# Patient Record
Sex: FEMALE | Race: WHITE | Employment: FULL TIME | URBAN - METROPOLITAN AREA
[De-identification: names, ages, dates, MRNs, and addresses within clinical notes are randomized per-mention and may not be internally consistent; named-entity substitution may affect disease eponyms.]

---

## 2018-04-20 RX ORDER — HEPARIN SODIUM (PORCINE) LOCK FLUSH IV SOLN 100 UNIT/ML 100 UNIT/ML
500 SOLUTION INTRAVENOUS PRN
Status: ACTIVE | OUTPATIENT
Start: 2018-04-20 | End: 2018-04-21

## 2018-04-20 RX ORDER — SODIUM CHLORIDE 0.9 % (FLUSH) 0.9 %
10 SYRINGE (ML) INJECTION PRN
Status: ACTIVE | OUTPATIENT
Start: 2018-04-20 | End: 2018-04-21

## 2018-04-27 ENCOUNTER — HOSPITAL ENCOUNTER (OUTPATIENT)
Dept: INFUSION THERAPY | Age: 48
Setting detail: INFUSION SERIES
Discharge: HOME | End: 2018-04-27
Payer: COMMERCIAL

## 2018-04-27 DIAGNOSIS — I87.8 POOR VENOUS ACCESS: ICD-10-CM

## 2018-04-27 RX ORDER — HEPARIN SODIUM (PORCINE) LOCK FLUSH IV SOLN 100 UNIT/ML 100 UNIT/ML
500 SOLUTION INTRAVENOUS PRN
Status: CANCELLED | OUTPATIENT
Start: 2018-04-27

## 2018-04-27 RX ORDER — HEPARIN SODIUM (PORCINE) LOCK FLUSH IV SOLN 100 UNIT/ML 100 UNIT/ML
500 SOLUTION INTRAVENOUS PRN
Status: ACTIVE | OUTPATIENT
Start: 2018-04-27 | End: 2018-04-28

## 2018-04-27 RX ORDER — SODIUM CHLORIDE 0.9 % (FLUSH) 0.9 %
10 SYRINGE (ML) INJECTION PRN
Status: CANCELLED | OUTPATIENT
Start: 2018-04-27

## 2018-04-27 RX ORDER — SODIUM CHLORIDE 0.9 % (FLUSH) 0.9 %
10 SYRINGE (ML) INJECTION PRN
Status: ACTIVE | OUTPATIENT
Start: 2018-04-27 | End: 2018-04-28

## 2018-05-02 ENCOUNTER — HOSPITAL ENCOUNTER (OUTPATIENT)
Dept: INFUSION THERAPY | Age: 48
Setting detail: INFUSION SERIES
Discharge: HOME | End: 2018-05-02
Payer: COMMERCIAL

## 2018-05-02 DIAGNOSIS — I87.8 POOR VENOUS ACCESS: ICD-10-CM

## 2018-05-02 PROCEDURE — 96523 IRRIG DRUG DELIVERY DEVICE: CPT

## 2018-05-02 RX ORDER — SODIUM CHLORIDE 0.9 % (FLUSH) 0.9 %
10 SYRINGE (ML) INJECTION PRN
Status: DISCONTINUED | OUTPATIENT
Start: 2018-05-02 | End: 2018-05-03 | Stop reason: HOSPADM

## 2018-05-02 RX ORDER — HEPARIN SODIUM (PORCINE) LOCK FLUSH IV SOLN 100 UNIT/ML 100 UNIT/ML
500 SOLUTION INTRAVENOUS PRN
Status: DISCONTINUED | OUTPATIENT
Start: 2018-05-02 | End: 2018-05-03 | Stop reason: HOSPADM

## 2018-05-02 RX ORDER — SODIUM CHLORIDE 0.9 % (FLUSH) 0.9 %
10 SYRINGE (ML) INJECTION PRN
Status: CANCELLED | OUTPATIENT
Start: 2018-05-02

## 2018-05-02 RX ORDER — HEPARIN SODIUM (PORCINE) LOCK FLUSH IV SOLN 100 UNIT/ML 100 UNIT/ML
500 SOLUTION INTRAVENOUS PRN
Status: CANCELLED | OUTPATIENT
Start: 2018-05-02

## 2018-05-25 ENCOUNTER — OFFICE VISIT (OUTPATIENT)
Dept: FAMILY MEDICINE CLINIC | Age: 48
End: 2018-05-25
Payer: COMMERCIAL

## 2018-05-25 VITALS
HEIGHT: 62 IN | RESPIRATION RATE: 14 BRPM | WEIGHT: 150 LBS | BODY MASS INDEX: 27.6 KG/M2 | SYSTOLIC BLOOD PRESSURE: 122 MMHG | TEMPERATURE: 98.5 F | OXYGEN SATURATION: 96 % | HEART RATE: 100 BPM | DIASTOLIC BLOOD PRESSURE: 68 MMHG

## 2018-05-25 DIAGNOSIS — F41.9 ANXIETY: ICD-10-CM

## 2018-05-25 DIAGNOSIS — M25.50 ARTHRALGIA, UNSPECIFIED JOINT: Primary | ICD-10-CM

## 2018-05-25 PROCEDURE — 99213 OFFICE O/P EST LOW 20 MIN: CPT | Performed by: NURSE PRACTITIONER

## 2018-05-25 RX ORDER — HYDROCODONE BITARTRATE AND ACETAMINOPHEN 5; 325 MG/1; MG/1
1 TABLET ORAL EVERY 8 HOURS PRN
Qty: 90 TABLET | Refills: 0 | Status: SHIPPED | OUTPATIENT
Start: 2018-05-25 | End: 2018-06-24

## 2018-05-25 RX ORDER — ALENDRONATE SODIUM 70 MG/1
70 TABLET ORAL WEEKLY
COMMUNITY
End: 2020-06-26

## 2018-05-25 RX ORDER — SULFASALAZINE 500 MG/1
500 TABLET ORAL 4 TIMES DAILY
COMMUNITY
End: 2018-09-07 | Stop reason: ALTCHOICE

## 2018-05-25 ASSESSMENT — ENCOUNTER SYMPTOMS
CONSTIPATION: 0
COUGH: 0
SHORTNESS OF BREATH: 1
DIARRHEA: 0
WHEEZING: 0
NAUSEA: 0
VOMITING: 0

## 2018-06-22 ENCOUNTER — HOSPITAL ENCOUNTER (OUTPATIENT)
Dept: INFUSION THERAPY | Age: 48
Setting detail: INFUSION SERIES
Discharge: HOME | End: 2018-06-22
Payer: COMMERCIAL

## 2018-06-22 PROCEDURE — 6360000002 HC RX W HCPCS: Performed by: INTERNAL MEDICINE

## 2018-06-22 PROCEDURE — 96523 IRRIG DRUG DELIVERY DEVICE: CPT

## 2018-06-22 PROCEDURE — 2580000003 HC RX 258: Performed by: INTERNAL MEDICINE

## 2018-06-22 RX ORDER — SODIUM CHLORIDE 0.9 % (FLUSH) 0.9 %
10 SYRINGE (ML) INJECTION PRN
Status: DISCONTINUED | OUTPATIENT
Start: 2018-06-22 | End: 2018-06-23 | Stop reason: HOSPADM

## 2018-06-22 RX ORDER — HEPARIN SODIUM (PORCINE) LOCK FLUSH IV SOLN 100 UNIT/ML 100 UNIT/ML
500 SOLUTION INTRAVENOUS PRN
Status: DISCONTINUED | OUTPATIENT
Start: 2018-06-22 | End: 2018-06-23 | Stop reason: HOSPADM

## 2018-06-22 RX ADMIN — Medication 10 ML: at 14:00

## 2018-06-22 RX ADMIN — Medication 10 ML: at 14:02

## 2018-06-22 RX ADMIN — Medication 10 ML: at 14:01

## 2018-06-22 RX ADMIN — Medication 500 UNITS: at 14:02

## 2018-07-06 ENCOUNTER — TELEPHONE (OUTPATIENT)
Dept: PHARMACY | Facility: CLINIC | Age: 48
End: 2018-07-06

## 2018-07-06 NOTE — TELEPHONE ENCOUNTER
CLINICAL PHARMACY CONSULT: MEDICATION CONVERSION INITIATIVE    Sabino Andujar is a 53 y.o. female referred to clinical pharmacy specialist by Roseline Colbert -  identified with current prescription for Proventil, which is non-preferred medication on insurance formulary. Choosing formulary options when clinically appropriate will help to decrease out of pocket cost for your patients, while maintaining the quality of care your patients receive    Indication: originally filled for acute URI, filled multiple times; Rx now   Current regimen: albuterol 2 puffs q6h prn wheezing; also on Symbicort 160-4.5 mcg 2 puff BID    Alternative(s):  - Per [facility], if appropriate ProAir (HFA or Respiclick) preferred albuterol inhaler so would be most cost effective option  - If patient is still using albuterol inhaler (appears was acute tx but appears she is also currently using Symbicort maintenance inhaler), pt needs new Rx sent to pharmacy, suggest change from Proventil HFA to ProAir HFA 2 puffs q6h prn    ======================================================  LM for patient to return call to review above.     Shruthi Alvarez, PharmD, [facility] Pharmacist  Direct: [phone]  Dept: [phone] (toll free [phone], option 7)

## 2018-07-06 NOTE — TELEPHONE ENCOUNTER
Pt returned requested call. States she has Proventil for prn use but does not wish to have new Rx sent at this time. Discussed previous information that ProAir should be less money for her. Pt appreciated outreach, will ask provider for new albuterol inhaler Rx if needed.     CLINICAL PHARMACY CONSULT: MED RECONCILIATION/REVIEW ADDENDUM    For Pharmacy Admin Tracking Only    PHSO: Yes  - Maintenance Safety Lab Monitoring #: 1  Time Spent (min): 1940 Medardo Coello, PharmD  [address]

## 2018-08-12 ENCOUNTER — HOSPITAL ENCOUNTER (EMERGENCY)
Age: 48
Discharge: HOME | End: 2018-08-12
Payer: COMMERCIAL

## 2018-08-12 VITALS
RESPIRATION RATE: 16 BRPM | OXYGEN SATURATION: 100 % | HEART RATE: 98 BPM | WEIGHT: 145 LBS | DIASTOLIC BLOOD PRESSURE: 87 MMHG | TEMPERATURE: 98.2 F | HEIGHT: 62 IN | SYSTOLIC BLOOD PRESSURE: 123 MMHG | BODY MASS INDEX: 26.68 KG/M2

## 2018-08-12 DIAGNOSIS — S80.11XA CONTUSION OF RIGHT LOWER LEG, INITIAL ENCOUNTER: Primary | ICD-10-CM

## 2018-08-12 DIAGNOSIS — S80.811A ABRASION OF RIGHT LOWER EXTREMITY, INITIAL ENCOUNTER: ICD-10-CM

## 2018-08-12 PROCEDURE — 99283 EMERGENCY DEPT VISIT LOW MDM: CPT

## 2018-08-12 PROCEDURE — 6360000002 HC RX W HCPCS: Performed by: PHYSICIAN ASSISTANT

## 2018-08-12 PROCEDURE — 90715 TDAP VACCINE 7 YRS/> IM: CPT | Performed by: PHYSICIAN ASSISTANT

## 2018-08-12 PROCEDURE — 90471 IMMUNIZATION ADMIN: CPT | Performed by: PHYSICIAN ASSISTANT

## 2018-08-12 RX ORDER — CEPHALEXIN 500 MG/1
500 CAPSULE ORAL 4 TIMES DAILY
Qty: 28 CAPSULE | Refills: 0 | Status: SHIPPED | OUTPATIENT
Start: 2018-08-12 | End: 2018-08-19

## 2018-08-12 RX ADMIN — TETANUS TOXOID, REDUCED DIPHTHERIA TOXOID AND ACELLULAR PERTUSSIS VACCINE, ADSORBED 0.5 ML: 5; 2.5; 8; 8; 2.5 SUSPENSION INTRAMUSCULAR at 12:54

## 2018-08-12 ASSESSMENT — PAIN DESCRIPTION - LOCATION: LOCATION: LEG

## 2018-08-12 ASSESSMENT — PAIN DESCRIPTION - PAIN TYPE: TYPE: ACUTE PAIN

## 2018-08-12 ASSESSMENT — PAIN SCALES - GENERAL: PAINLEVEL_OUTOF10: 7

## 2018-08-12 ASSESSMENT — PAIN DESCRIPTION - ORIENTATION: ORIENTATION: RIGHT

## 2018-08-15 ENCOUNTER — CARE COORDINATION (OUTPATIENT)
Dept: CARE COORDINATION | Age: 48
End: 2018-08-15

## 2018-08-17 ENCOUNTER — HOSPITAL ENCOUNTER (OUTPATIENT)
Dept: INFUSION THERAPY | Age: 48
Setting detail: INFUSION SERIES
Discharge: HOME | End: 2018-08-17
Payer: COMMERCIAL

## 2018-08-17 VITALS
DIASTOLIC BLOOD PRESSURE: 70 MMHG | HEART RATE: 90 BPM | OXYGEN SATURATION: 100 % | TEMPERATURE: 98 F | RESPIRATION RATE: 18 BRPM | SYSTOLIC BLOOD PRESSURE: 122 MMHG

## 2018-08-17 PROCEDURE — 2580000003 HC RX 258: Performed by: INTERNAL MEDICINE

## 2018-08-17 PROCEDURE — 96523 IRRIG DRUG DELIVERY DEVICE: CPT

## 2018-08-17 PROCEDURE — 6360000002 HC RX W HCPCS: Performed by: INTERNAL MEDICINE

## 2018-08-17 RX ORDER — SODIUM CHLORIDE 0.9 % (FLUSH) 0.9 %
10 SYRINGE (ML) INJECTION PRN
Status: DISCONTINUED | OUTPATIENT
Start: 2018-08-17 | End: 2018-08-18 | Stop reason: HOSPADM

## 2018-08-17 RX ORDER — HEPARIN SODIUM (PORCINE) LOCK FLUSH IV SOLN 100 UNIT/ML 100 UNIT/ML
500 SOLUTION INTRAVENOUS PRN
Status: DISCONTINUED | OUTPATIENT
Start: 2018-08-17 | End: 2018-08-18 | Stop reason: HOSPADM

## 2018-08-17 RX ADMIN — Medication 500 UNITS: at 14:42

## 2018-08-17 RX ADMIN — Medication 10 ML: at 14:41

## 2018-08-17 RX ADMIN — Medication 10 ML: at 14:40

## 2018-09-07 ENCOUNTER — OFFICE VISIT (OUTPATIENT)
Dept: FAMILY MEDICINE CLINIC | Age: 48
End: 2018-09-07
Payer: COMMERCIAL

## 2018-09-07 VITALS
DIASTOLIC BLOOD PRESSURE: 78 MMHG | SYSTOLIC BLOOD PRESSURE: 126 MMHG | WEIGHT: 152 LBS | RESPIRATION RATE: 16 BRPM | OXYGEN SATURATION: 97 % | HEART RATE: 99 BPM | HEIGHT: 62 IN | BODY MASS INDEX: 27.97 KG/M2 | TEMPERATURE: 97.9 F

## 2018-09-07 DIAGNOSIS — M05.79 RHEUMATOID ARTHRITIS INVOLVING MULTIPLE SITES WITH POSITIVE RHEUMATOID FACTOR (HCC): Primary | ICD-10-CM

## 2018-09-07 PROCEDURE — 99213 OFFICE O/P EST LOW 20 MIN: CPT | Performed by: NURSE PRACTITIONER

## 2018-09-07 RX ORDER — HYDROCODONE BITARTRATE AND ACETAMINOPHEN 5; 325 MG/1; MG/1
1 TABLET ORAL EVERY 8 HOURS PRN
Qty: 90 TABLET | Refills: 0 | Status: SHIPPED | OUTPATIENT
Start: 2018-09-07 | End: 2019-03-29 | Stop reason: SDUPTHER

## 2018-09-07 RX ORDER — HYDROCODONE BITARTRATE AND ACETAMINOPHEN 5; 325 MG/1; MG/1
1 TABLET ORAL EVERY 8 HOURS PRN
COMMUNITY
End: 2018-09-07 | Stop reason: SDUPTHER

## 2018-09-07 ASSESSMENT — ENCOUNTER SYMPTOMS
NAUSEA: 0
COUGH: 0
VOMITING: 0
DIARRHEA: 0
SHORTNESS OF BREATH: 1
CONSTIPATION: 0
WHEEZING: 0

## 2018-10-30 ENCOUNTER — HOSPITAL ENCOUNTER (OUTPATIENT)
Dept: INFUSION THERAPY | Age: 48
Setting detail: INFUSION SERIES
Discharge: HOME | End: 2018-10-30
Payer: COMMERCIAL

## 2018-10-30 VITALS
DIASTOLIC BLOOD PRESSURE: 89 MMHG | HEART RATE: 81 BPM | TEMPERATURE: 98.2 F | RESPIRATION RATE: 18 BRPM | SYSTOLIC BLOOD PRESSURE: 142 MMHG | OXYGEN SATURATION: 98 %

## 2018-10-30 DIAGNOSIS — I87.8 POOR VENOUS ACCESS: ICD-10-CM

## 2018-10-30 PROCEDURE — 2580000003 HC RX 258: Performed by: INTERNAL MEDICINE

## 2018-10-30 PROCEDURE — 96523 IRRIG DRUG DELIVERY DEVICE: CPT

## 2018-10-30 PROCEDURE — 6360000002 HC RX W HCPCS: Performed by: INTERNAL MEDICINE

## 2018-10-30 RX ORDER — HEPARIN SODIUM (PORCINE) LOCK FLUSH IV SOLN 100 UNIT/ML 100 UNIT/ML
500 SOLUTION INTRAVENOUS PRN
Status: CANCELLED | OUTPATIENT
Start: 2018-10-30

## 2018-10-30 RX ORDER — SODIUM CHLORIDE 0.9 % (FLUSH) 0.9 %
10 SYRINGE (ML) INJECTION PRN
Status: CANCELLED | OUTPATIENT
Start: 2018-10-30

## 2018-10-30 RX ORDER — SODIUM CHLORIDE 0.9 % (FLUSH) 0.9 %
10 SYRINGE (ML) INJECTION PRN
Status: DISCONTINUED | OUTPATIENT
Start: 2018-10-30 | End: 2018-10-31 | Stop reason: HOSPADM

## 2018-10-30 RX ORDER — HEPARIN SODIUM (PORCINE) LOCK FLUSH IV SOLN 100 UNIT/ML 100 UNIT/ML
500 SOLUTION INTRAVENOUS PRN
Status: DISCONTINUED | OUTPATIENT
Start: 2018-10-30 | End: 2018-10-31 | Stop reason: HOSPADM

## 2018-10-30 RX ADMIN — Medication 500 UNITS: at 13:51

## 2018-10-30 RX ADMIN — Medication 10 ML: at 13:51

## 2018-10-30 RX ADMIN — Medication 10 ML: at 13:50

## 2018-12-27 ENCOUNTER — OFFICE VISIT (OUTPATIENT)
Dept: FAMILY MEDICINE CLINIC | Age: 48
End: 2018-12-27
Payer: COMMERCIAL

## 2018-12-27 VITALS
BODY MASS INDEX: 28.16 KG/M2 | HEIGHT: 62 IN | RESPIRATION RATE: 16 BRPM | HEART RATE: 92 BPM | WEIGHT: 153 LBS | DIASTOLIC BLOOD PRESSURE: 68 MMHG | TEMPERATURE: 98.8 F | OXYGEN SATURATION: 98 % | SYSTOLIC BLOOD PRESSURE: 112 MMHG

## 2018-12-27 DIAGNOSIS — J32.9 SINOBRONCHITIS: ICD-10-CM

## 2018-12-27 DIAGNOSIS — R05.9 COUGH: ICD-10-CM

## 2018-12-27 DIAGNOSIS — R06.2 WHEEZING: ICD-10-CM

## 2018-12-27 DIAGNOSIS — F41.9 ANXIETY: ICD-10-CM

## 2018-12-27 DIAGNOSIS — J40 SINOBRONCHITIS: ICD-10-CM

## 2018-12-27 DIAGNOSIS — J06.9 VIRAL URI: Primary | ICD-10-CM

## 2018-12-27 PROCEDURE — 94640 AIRWAY INHALATION TREATMENT: CPT | Performed by: NURSE PRACTITIONER

## 2018-12-27 PROCEDURE — 99213 OFFICE O/P EST LOW 20 MIN: CPT | Performed by: NURSE PRACTITIONER

## 2018-12-27 RX ORDER — IPRATROPIUM BROMIDE AND ALBUTEROL SULFATE 2.5; .5 MG/3ML; MG/3ML
1 SOLUTION RESPIRATORY (INHALATION) ONCE
Status: COMPLETED | OUTPATIENT
Start: 2018-12-27 | End: 2018-12-27

## 2018-12-27 RX ORDER — DEXTROMETHORPHAN HYDROBROMIDE AND PROMETHAZINE HYDROCHLORIDE 15; 6.25 MG/5ML; MG/5ML
5 SYRUP ORAL 4 TIMES DAILY PRN
Qty: 120 ML | Refills: 0 | Status: SHIPPED | OUTPATIENT
Start: 2018-12-27 | End: 2019-01-06

## 2018-12-27 RX ORDER — GUAIFENESIN 600 MG/1
600 TABLET, EXTENDED RELEASE ORAL 2 TIMES DAILY
Qty: 30 TABLET | Refills: 0 | Status: SHIPPED | OUTPATIENT
Start: 2018-12-27 | End: 2019-03-29

## 2018-12-27 RX ORDER — BUDESONIDE AND FORMOTEROL FUMARATE DIHYDRATE 160; 4.5 UG/1; UG/1
2 AEROSOL RESPIRATORY (INHALATION) 2 TIMES DAILY
Qty: 10.2 G | Refills: 2 | Status: SHIPPED | OUTPATIENT
Start: 2018-12-27 | End: 2019-06-21 | Stop reason: SDUPTHER

## 2018-12-27 RX ORDER — ALBUTEROL SULFATE 90 UG/1
2 AEROSOL, METERED RESPIRATORY (INHALATION) EVERY 6 HOURS PRN
Qty: 1 INHALER | Refills: 0 | Status: SHIPPED
Start: 2018-12-27 | End: 2020-06-26

## 2018-12-27 RX ORDER — AZITHROMYCIN 250 MG/1
TABLET, FILM COATED ORAL
Qty: 1 PACKET | Refills: 0 | Status: SHIPPED | OUTPATIENT
Start: 2018-12-27 | End: 2019-03-29

## 2018-12-27 RX ADMIN — IPRATROPIUM BROMIDE AND ALBUTEROL SULFATE 1 AMPULE: 2.5; .5 SOLUTION RESPIRATORY (INHALATION) at 11:47

## 2018-12-27 ASSESSMENT — ENCOUNTER SYMPTOMS
WHEEZING: 1
NAUSEA: 0
SHORTNESS OF BREATH: 0
SORE THROAT: 1
COUGH: 1
SINUS PRESSURE: 1
DIARRHEA: 0
VOMITING: 0

## 2018-12-27 ASSESSMENT — PATIENT HEALTH QUESTIONNAIRE - PHQ9
SUM OF ALL RESPONSES TO PHQ QUESTIONS 1-9: 0
SUM OF ALL RESPONSES TO PHQ9 QUESTIONS 1 & 2: 0
1. LITTLE INTEREST OR PLEASURE IN DOING THINGS: 0
SUM OF ALL RESPONSES TO PHQ QUESTIONS 1-9: 0
2. FEELING DOWN, DEPRESSED OR HOPELESS: 0

## 2019-01-08 DIAGNOSIS — J32.9 SINOBRONCHITIS: Primary | ICD-10-CM

## 2019-01-08 DIAGNOSIS — J40 SINOBRONCHITIS: Primary | ICD-10-CM

## 2019-01-08 RX ORDER — AMOXICILLIN 875 MG/1
875 TABLET, COATED ORAL 2 TIMES DAILY
Qty: 20 TABLET | Refills: 0 | Status: SHIPPED | OUTPATIENT
Start: 2019-01-08 | End: 2019-01-18

## 2019-01-08 RX ORDER — FLUCONAZOLE 150 MG/1
150 TABLET ORAL ONCE
Qty: 1 TABLET | Refills: 1 | Status: SHIPPED | OUTPATIENT
Start: 2019-01-08 | End: 2019-01-08

## 2019-03-29 ENCOUNTER — OFFICE VISIT (OUTPATIENT)
Dept: FAMILY MEDICINE CLINIC | Age: 49
End: 2019-03-29
Payer: COMMERCIAL

## 2019-03-29 VITALS
TEMPERATURE: 98.1 F | HEART RATE: 72 BPM | BODY MASS INDEX: 25.76 KG/M2 | OXYGEN SATURATION: 97 % | SYSTOLIC BLOOD PRESSURE: 116 MMHG | HEIGHT: 62 IN | RESPIRATION RATE: 16 BRPM | WEIGHT: 140 LBS | DIASTOLIC BLOOD PRESSURE: 60 MMHG

## 2019-03-29 DIAGNOSIS — M05.79 RHEUMATOID ARTHRITIS INVOLVING MULTIPLE SITES WITH POSITIVE RHEUMATOID FACTOR (HCC): ICD-10-CM

## 2019-03-29 PROCEDURE — 99213 OFFICE O/P EST LOW 20 MIN: CPT | Performed by: NURSE PRACTITIONER

## 2019-03-29 RX ORDER — HYDROCODONE BITARTRATE AND ACETAMINOPHEN 5; 325 MG/1; MG/1
1 TABLET ORAL EVERY 8 HOURS PRN
Qty: 90 TABLET | Refills: 0 | Status: ON HOLD | OUTPATIENT
Start: 2019-03-29 | End: 2019-04-13 | Stop reason: HOSPADM

## 2019-03-29 RX ORDER — HYDROCODONE BITARTRATE AND ACETAMINOPHEN 5; 325 MG/1; MG/1
1 TABLET ORAL EVERY 8 HOURS PRN
COMMUNITY
End: 2019-03-29

## 2019-03-29 ASSESSMENT — ENCOUNTER SYMPTOMS
VOMITING: 0
WHEEZING: 0
NAUSEA: 0
CONSTIPATION: 0
SHORTNESS OF BREATH: 0
DIARRHEA: 0
COUGH: 0

## 2019-04-10 ENCOUNTER — HOSPITAL ENCOUNTER (INPATIENT)
Age: 49
LOS: 3 days | Discharge: HOME | DRG: 194 | End: 2019-04-13
Attending: EMERGENCY MEDICINE | Admitting: INTERNAL MEDICINE
Payer: COMMERCIAL

## 2019-04-10 ENCOUNTER — APPOINTMENT (OUTPATIENT)
Dept: CT IMAGING | Age: 49
DRG: 194 | End: 2019-04-10
Payer: COMMERCIAL

## 2019-04-10 ENCOUNTER — APPOINTMENT (OUTPATIENT)
Dept: GENERAL RADIOLOGY | Age: 49
DRG: 194 | End: 2019-04-10
Payer: COMMERCIAL

## 2019-04-10 DIAGNOSIS — R91.8 MASS OF UPPER LOBE OF RIGHT LUNG: ICD-10-CM

## 2019-04-10 DIAGNOSIS — J18.9 COMMUNITY ACQUIRED PNEUMONIA OF RIGHT LUNG, UNSPECIFIED PART OF LUNG: Primary | ICD-10-CM

## 2019-04-10 PROBLEM — J15.9 COMMUNITY ACQUIRED BACTERIAL PNEUMONIA: Status: ACTIVE | Noted: 2019-04-10

## 2019-04-10 LAB
ANION GAP SERPL CALCULATED.3IONS-SCNC: 12 MMOL/L (ref 7–16)
BACTERIA: ABNORMAL /HPF
BASOPHILS ABSOLUTE: 0.04 E9/L (ref 0–0.2)
BASOPHILS RELATIVE PERCENT: 0.4 % (ref 0–2)
BILIRUBIN URINE: NEGATIVE
BLOOD, URINE: NEGATIVE
BUN BLDV-MCNC: 8 MG/DL (ref 6–20)
CALCIUM SERPL-MCNC: 9.9 MG/DL (ref 8.6–10.2)
CHLORIDE BLD-SCNC: 98 MMOL/L (ref 98–107)
CLARITY: CLEAR
CO2: 26 MMOL/L (ref 22–29)
COLOR: ABNORMAL
CREAT SERPL-MCNC: 0.7 MG/DL (ref 0.5–1)
EOSINOPHILS ABSOLUTE: 0.01 E9/L (ref 0.05–0.5)
EOSINOPHILS RELATIVE PERCENT: 0.1 % (ref 0–6)
GFR AFRICAN AMERICAN: >60
GFR NON-AFRICAN AMERICAN: >60 ML/MIN/1.73
GLUCOSE BLD-MCNC: 95 MG/DL (ref 74–99)
GLUCOSE URINE: NEGATIVE MG/DL
HCT VFR BLD CALC: 40.5 % (ref 34–48)
HEMOGLOBIN: 13.2 G/DL (ref 11.5–15.5)
IMMATURE GRANULOCYTES #: 0.04 E9/L
IMMATURE GRANULOCYTES %: 0.4 % (ref 0–5)
INFLUENZA A BY PCR: NOT DETECTED
INFLUENZA B BY PCR: NOT DETECTED
KETONES, URINE: NEGATIVE MG/DL
LEUKOCYTE ESTERASE, URINE: NEGATIVE
LYMPHOCYTES ABSOLUTE: 0.83 E9/L (ref 1.5–4)
LYMPHOCYTES RELATIVE PERCENT: 7.8 % (ref 20–42)
MCH RBC QN AUTO: 27.9 PG (ref 26–35)
MCHC RBC AUTO-ENTMCNC: 32.6 % (ref 32–34.5)
MCV RBC AUTO: 85.6 FL (ref 80–99.9)
MONOCYTES ABSOLUTE: 0.59 E9/L (ref 0.1–0.95)
MONOCYTES RELATIVE PERCENT: 5.6 % (ref 2–12)
NEUTROPHILS ABSOLUTE: 9.09 E9/L (ref 1.8–7.3)
NEUTROPHILS RELATIVE PERCENT: 85.7 % (ref 43–80)
NITRITE, URINE: NEGATIVE
PDW BLD-RTO: 14.1 FL (ref 11.5–15)
PH UA: 7 (ref 5–9)
PLATELET # BLD: 224 E9/L (ref 130–450)
PMV BLD AUTO: 10.4 FL (ref 7–12)
POTASSIUM SERPL-SCNC: 3.6 MMOL/L (ref 3.5–5)
PROTEIN UA: NEGATIVE MG/DL
RBC # BLD: 4.73 E12/L (ref 3.5–5.5)
RBC UA: ABNORMAL /HPF (ref 0–2)
SODIUM BLD-SCNC: 136 MMOL/L (ref 132–146)
SPECIFIC GRAVITY UA: <=1.005 (ref 1–1.03)
TROPONIN: <0.01 NG/ML (ref 0–0.03)
UROBILINOGEN, URINE: 0.2 E.U./DL
WBC # BLD: 10.6 E9/L (ref 4.5–11.5)
WBC UA: ABNORMAL /HPF (ref 0–5)

## 2019-04-10 PROCEDURE — 71275 CT ANGIOGRAPHY CHEST: CPT

## 2019-04-10 PROCEDURE — 2580000003 HC RX 258: Performed by: INTERNAL MEDICINE

## 2019-04-10 PROCEDURE — 6360000002 HC RX W HCPCS: Performed by: EMERGENCY MEDICINE

## 2019-04-10 PROCEDURE — 87081 CULTURE SCREEN ONLY: CPT

## 2019-04-10 PROCEDURE — 87633 RESP VIRUS 12-25 TARGETS: CPT

## 2019-04-10 PROCEDURE — 87486 CHLMYD PNEUM DNA AMP PROBE: CPT

## 2019-04-10 PROCEDURE — 6370000000 HC RX 637 (ALT 250 FOR IP): Performed by: INTERNAL MEDICINE

## 2019-04-10 PROCEDURE — 87040 BLOOD CULTURE FOR BACTERIA: CPT

## 2019-04-10 PROCEDURE — 99285 EMERGENCY DEPT VISIT HI MDM: CPT

## 2019-04-10 PROCEDURE — 85025 COMPLETE CBC W/AUTO DIFF WBC: CPT

## 2019-04-10 PROCEDURE — 96375 TX/PRO/DX INJ NEW DRUG ADDON: CPT

## 2019-04-10 PROCEDURE — 6360000004 HC RX CONTRAST MEDICATION: Performed by: RADIOLOGY

## 2019-04-10 PROCEDURE — 87450 HC DIRECT STREP B ANTIGEN: CPT

## 2019-04-10 PROCEDURE — 80048 BASIC METABOLIC PNL TOTAL CA: CPT

## 2019-04-10 PROCEDURE — 81001 URINALYSIS AUTO W/SCOPE: CPT

## 2019-04-10 PROCEDURE — 87798 DETECT AGENT NOS DNA AMP: CPT

## 2019-04-10 PROCEDURE — 87502 INFLUENZA DNA AMP PROBE: CPT

## 2019-04-10 PROCEDURE — 2060000000 HC ICU INTERMEDIATE R&B

## 2019-04-10 PROCEDURE — 36415 COLL VENOUS BLD VENIPUNCTURE: CPT

## 2019-04-10 PROCEDURE — 6360000002 HC RX W HCPCS: Performed by: INTERNAL MEDICINE

## 2019-04-10 PROCEDURE — 87581 M.PNEUMON DNA AMP PROBE: CPT

## 2019-04-10 PROCEDURE — 94640 AIRWAY INHALATION TREATMENT: CPT

## 2019-04-10 PROCEDURE — 71045 X-RAY EXAM CHEST 1 VIEW: CPT

## 2019-04-10 PROCEDURE — 93005 ELECTROCARDIOGRAM TRACING: CPT | Performed by: EMERGENCY MEDICINE

## 2019-04-10 PROCEDURE — 2580000003 HC RX 258: Performed by: EMERGENCY MEDICINE

## 2019-04-10 PROCEDURE — 84484 ASSAY OF TROPONIN QUANT: CPT

## 2019-04-10 PROCEDURE — 96365 THER/PROPH/DIAG IV INF INIT: CPT

## 2019-04-10 PROCEDURE — 6370000000 HC RX 637 (ALT 250 FOR IP): Performed by: EMERGENCY MEDICINE

## 2019-04-10 RX ORDER — SODIUM CHLORIDE 9 MG/ML
INJECTION, SOLUTION INTRAVENOUS CONTINUOUS
Status: DISCONTINUED | OUTPATIENT
Start: 2019-04-10 | End: 2019-04-13 | Stop reason: HOSPADM

## 2019-04-10 RX ORDER — SODIUM CHLORIDE 0.9 % (FLUSH) 0.9 %
10 SYRINGE (ML) INJECTION EVERY 12 HOURS SCHEDULED
Status: DISCONTINUED | OUTPATIENT
Start: 2019-04-10 | End: 2019-04-13 | Stop reason: HOSPADM

## 2019-04-10 RX ORDER — IPRATROPIUM BROMIDE AND ALBUTEROL SULFATE 2.5; .5 MG/3ML; MG/3ML
1 SOLUTION RESPIRATORY (INHALATION) ONCE
Status: COMPLETED | OUTPATIENT
Start: 2019-04-10 | End: 2019-04-10

## 2019-04-10 RX ORDER — LORAZEPAM 2 MG/ML
1 INJECTION INTRAMUSCULAR EVERY 6 HOURS PRN
Status: DISCONTINUED | OUTPATIENT
Start: 2019-04-10 | End: 2019-04-13 | Stop reason: HOSPADM

## 2019-04-10 RX ORDER — METHYLPREDNISOLONE SODIUM SUCCINATE 125 MG/2ML
80 INJECTION, POWDER, LYOPHILIZED, FOR SOLUTION INTRAMUSCULAR; INTRAVENOUS EVERY 8 HOURS
Status: DISCONTINUED | OUTPATIENT
Start: 2019-04-10 | End: 2019-04-11

## 2019-04-10 RX ORDER — SODIUM CHLORIDE 0.9 % (FLUSH) 0.9 %
10 SYRINGE (ML) INJECTION PRN
Status: DISCONTINUED | OUTPATIENT
Start: 2019-04-10 | End: 2019-04-13 | Stop reason: HOSPADM

## 2019-04-10 RX ORDER — ACETAMINOPHEN 325 MG/1
650 TABLET ORAL EVERY 4 HOURS PRN
Status: DISCONTINUED | OUTPATIENT
Start: 2019-04-10 | End: 2019-04-13 | Stop reason: HOSPADM

## 2019-04-10 RX ORDER — IPRATROPIUM BROMIDE AND ALBUTEROL SULFATE 2.5; .5 MG/3ML; MG/3ML
1 SOLUTION RESPIRATORY (INHALATION)
Status: DISCONTINUED | OUTPATIENT
Start: 2019-04-10 | End: 2019-04-13 | Stop reason: HOSPADM

## 2019-04-10 RX ORDER — BENZONATATE 100 MG/1
100 CAPSULE ORAL 3 TIMES DAILY PRN
Status: DISCONTINUED | OUTPATIENT
Start: 2019-04-10 | End: 2019-04-13 | Stop reason: HOSPADM

## 2019-04-10 RX ORDER — SULFASALAZINE 500 MG/1
1000 TABLET ORAL 2 TIMES DAILY
COMMUNITY
End: 2020-05-22 | Stop reason: SDUPTHER

## 2019-04-10 RX ORDER — AZITHROMYCIN 250 MG/1
500 TABLET, FILM COATED ORAL DAILY
Status: DISCONTINUED | OUTPATIENT
Start: 2019-04-11 | End: 2019-04-13 | Stop reason: HOSPADM

## 2019-04-10 RX ORDER — CLINDAMYCIN PHOSPHATE 600 MG/50ML
600 INJECTION INTRAVENOUS EVERY 8 HOURS
Status: CANCELLED | OUTPATIENT
Start: 2019-04-10

## 2019-04-10 RX ORDER — 0.9 % SODIUM CHLORIDE 0.9 %
1000 INTRAVENOUS SOLUTION INTRAVENOUS ONCE
Status: COMPLETED | OUTPATIENT
Start: 2019-04-10 | End: 2019-04-10

## 2019-04-10 RX ADMIN — WATER 1 G: 1 INJECTION INTRAMUSCULAR; INTRAVENOUS; SUBCUTANEOUS at 11:10

## 2019-04-10 RX ADMIN — AZITHROMYCIN MONOHYDRATE 500 MG: 500 INJECTION, POWDER, LYOPHILIZED, FOR SOLUTION INTRAVENOUS at 11:11

## 2019-04-10 RX ADMIN — IPRATROPIUM BROMIDE AND ALBUTEROL SULFATE 1 AMPULE: 2.5; .5 SOLUTION RESPIRATORY (INHALATION) at 08:09

## 2019-04-10 RX ADMIN — METHYLPREDNISOLONE SODIUM SUCCINATE 80 MG: 125 INJECTION, POWDER, FOR SOLUTION INTRAMUSCULAR; INTRAVENOUS at 13:55

## 2019-04-10 RX ADMIN — BENZONATATE 100 MG: 100 CAPSULE ORAL at 13:56

## 2019-04-10 RX ADMIN — ENOXAPARIN SODIUM 40 MG: 100 INJECTION SUBCUTANEOUS at 13:56

## 2019-04-10 RX ADMIN — IPRATROPIUM BROMIDE AND ALBUTEROL SULFATE 1 AMPULE: .5; 3 SOLUTION RESPIRATORY (INHALATION) at 18:57

## 2019-04-10 RX ADMIN — IPRATROPIUM BROMIDE AND ALBUTEROL SULFATE 1 AMPULE: .5; 3 SOLUTION RESPIRATORY (INHALATION) at 13:58

## 2019-04-10 RX ADMIN — SODIUM CHLORIDE 1000 ML: 9 INJECTION, SOLUTION INTRAVENOUS at 08:34

## 2019-04-10 RX ADMIN — IOPAMIDOL 80 ML: 755 INJECTION, SOLUTION INTRAVENOUS at 09:26

## 2019-04-10 RX ADMIN — SODIUM CHLORIDE: 9 INJECTION, SOLUTION INTRAVENOUS at 14:59

## 2019-04-10 RX ADMIN — LORAZEPAM 1 MG: 2 INJECTION INTRAMUSCULAR; INTRAVENOUS at 22:22

## 2019-04-10 RX ADMIN — Medication 10 ML: at 22:06

## 2019-04-10 RX ADMIN — METHYLPREDNISOLONE SODIUM SUCCINATE 80 MG: 125 INJECTION, POWDER, FOR SOLUTION INTRAMUSCULAR; INTRAVENOUS at 22:06

## 2019-04-10 RX ADMIN — ACETAMINOPHEN 650 MG: 325 TABLET ORAL at 13:55

## 2019-04-10 ASSESSMENT — ENCOUNTER SYMPTOMS
WHEEZING: 0
BACK PAIN: 0
SHORTNESS OF BREATH: 1
PHOTOPHOBIA: 0
CONSTIPATION: 0
NAUSEA: 0
SINUS PAIN: 0
RHINORRHEA: 0
SINUS PRESSURE: 0
VOMITING: 0
SORE THROAT: 0
CHEST TIGHTNESS: 1
DIARRHEA: 0
ABDOMINAL PAIN: 0
COUGH: 1

## 2019-04-10 ASSESSMENT — PAIN SCALES - GENERAL
PAINLEVEL_OUTOF10: 0
PAINLEVEL_OUTOF10: 7
PAINLEVEL_OUTOF10: 0
PAINLEVEL_OUTOF10: 0

## 2019-04-10 ASSESSMENT — PAIN DESCRIPTION - LOCATION: LOCATION: CHEST

## 2019-04-10 ASSESSMENT — PAIN DESCRIPTION - ORIENTATION: ORIENTATION: RIGHT;LEFT

## 2019-04-10 NOTE — ED NOTES
Report called to floor RN. All questions and concerns answered.   To floor via bed     Sonia Tang RN  04/10/19 [id number]

## 2019-04-10 NOTE — CONSULTS
Consults   Hematology/Oncology  Consult      Patient Name: Sarah Pan  YOB: 1970  PCP: Мария Chan DO   Referring Provider:      Reason for Consultation:   Chief Complaint   Patient presents with    Shortness of Breath     pt to er via ambulatory was at work c/o of sob jasiel 2 days states hx of lung ca 2015         History of Present Illness:  50-year-old woman with history of lung cancer. 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 She was treated at [facility] by Dr. Ava Ward . She received 2 concurrent cycles of cisplatin and etoposide with chest radiation followed by consolidation with 2 additional cycles of chemotherapy. She did well for 3 years and was followed with surveillance and apparently her CT had shown a nonspecific sclerotic lesion at the level of the 3rd rib. She had developed an episode of herpes zoster last year  She has history of rheumatoid arthritis and was treated with Plaquenil and prednisone      She is now hospitalized through the emergency room with chest wall pain along with shortness of breath. Follow-up CT chest shows new area of obstruction and volume loss in the middle lobe highly suspicious for disease recurrence with airspace disease in the right lower lobe likely pneumonia.       Diagnostic Data:     Past Medical History:   Diagnosis Date    Cancer [facility]) lung    Depression 6/22/2016    Pneumonia     Rheumatoid arthritis ([facility]     Rheumatoid arthritis involving multiple sites with positive rheumatoid factor ([facility] 9/7/2018       Patient Active Problem List    Diagnosis Date Noted    Neutropenic fever ([facility] 01/04/2016     Priority: High    Heel spur 04/03/2015     Priority: Low     Class: Present on Admission    Achilles tendinitis of left lower extremity 04/03/2015     Priority: Low     Class: Present on Admission    Community acquired bacterial pneumonia 04/10/2019    Lung mass 04/10/2019    Rheumatoid arthritis involving multiple sites with positive rheumatoid factor ([facility] 09/07/2018    Depression 06/22/2016    Arthralgia 05/31/2016    Arthralgia 05/03/2016    Localized edema 04/25/2016    Tenosynovitis, de Quervain 04/01/2016    Acute nasopharyngitis 03/14/2016    Poor venous access 02/12/2016    Bronchogenic carcinoma of right lung ([facility] 01/14/2016    Abscess of left upper extremity 01/08/2016        Past Surgical History:   Procedure Laterality Date    BLADDER SURGERY      HYSTERECTOMY      LUNG CANCER SURGERY Right 2015    OTHER SURGICAL HISTORY Right 04/03/15    retro calconeal heel spur excision with speed bridge right foot    TUBAL LIGATION         Family History  History reviewed. No pertinent family history. Social History    TOBACCO:   reports that she has quit smoking. She smoked 1.00 pack per day. She has never used smokeless tobacco.  ETOH:   reports that she drinks alcohol. Home Medications  Prior to Admission medications    Medication Sig Start Date End Date Taking? Authorizing Provider   influenza virus trivalent vaccine (FLUZONE) injection Inject 0.5 mLs into the muscle once Given 9/7/2018   Yes Historical Provider, MD   sulfaSALAzine (AZULFIDINE) 500 MG tablet Take 1,000 mg by mouth 2 times daily   Yes Historical Provider, MD   Ascorbic Acid (VITAMIN C PO) Take 1 tablet by mouth daily   Yes Historical Provider, MD   HYDROcodone-acetaminophen (NORCO) 5-325 MG per tablet Take 1 tablet by mouth every 8 hours as needed for Pain for up to 30 days.  3/29/19 4/28/19 Yes Kristyn Owen, APRN - CNP   sertraline (ZOLOFT) 50 MG tablet Take 1 tablet by mouth daily 12/27/18 Yes Kristyn Flores APRN - CNP   albuterol sulfate  (90 Base) MCG/ACT inhaler Inhale 2 puffs into the lungs every 6 hours as needed for Wheezing 12/27/18  Yes ALLEN Whiteside - CORI   budesonide-formoterol (SYMBICORT) 160-4.5 MCG/ACT AERO Inhale 2 puffs into the lungs 2 times daily  Patient taking differently: Inhale 2 puffs into the lungs 2 times daily as needed (Shortness of Breath/Wheezing)  12/27/18  Yes Kev Flores APRN - CNP   alendronate (FOSAMAX) 70 MG tablet Take 70 mg by mouth once a week Monday   Yes Historical Provider, MD   hydroxychloroquine (PLAQUENIL) 200 MG tablet Take 200 mg by mouth daily   Yes Historical Provider, MD       Allergies  No Known Allergies    Review of Systems:  Relevant for progressively worsening shortness of breath with occasional cough atypical chest pain and discomfort as well as occasional joint aches  No weight loss    Objective  /69   Pulse 111   Temp 100.5 °F (38.1 °C) (Oral)   Resp 24   Ht 5' 2\" (1.575 m)   Wt 145 lb (65.8 kg)   LMP  (LMP Unknown)   SpO2 97%   BMI 26.52 kg/m²     Physical Performance Status : 0    Physical Exam:  General: AAO to person, place, time,no acute distress, pleasant   Head and neck : PERRLA, EOMI . Sclera non icteric. Oropharynx : Clear  Neck: no JVD,  no adenopathy  Lungs: Decreased breath sounds in right lung fields with scattered rhonchi  Heart: Regular rate and regular rhythm,  Abdomen: Soft, non-tender;no masses, no organomegaly  Extremities: No edema,no cyanosis, no clubbing. Skin:  No rash. Neurologic:Cranial nerves grossly intact. No focal motor or sensory deficits .     Recent Laboratory Data-   Lab Results   Component Value Date    WBC 10.6 04/10/2019    HGB 13.2 04/10/2019    HCT 40.5 04/10/2019    MCV 85.6 04/10/2019     04/10/2019    LYMPHOPCT 7.8 (L) 04/10/2019    RBC 4.73 04/10/2019    MCH 27.9 04/10/2019    MCHC 32.6 04/10/2019    RDW 14.1 04/10/2019    NEUTOPHILPCT 85.7 (H) 04/10/2019 MONOPCT 5.6 04/10/2019    BASOPCT 0.4 04/10/2019    NEUTROABS 9.09 (H) 04/10/2019    LYMPHSABS 0.83 (L) 04/10/2019    MONOSABS 0.59 04/10/2019    EOSABS 0.01 (L) 04/10/2019    BASOSABS 0.04 04/10/2019       Lab Results   Component Value Date     04/10/2019    K 3.6 04/10/2019    CL 98 04/10/2019    CO2 26 04/10/2019    BUN 8 04/10/2019    CREATININE 0.7 04/10/2019    GLUCOSE 95 04/10/2019    CALCIUM 9.9 04/10/2019    PROT 7.9 06/01/2016    LABALBU 3.6 06/01/2016    BILITOT 0.3 06/01/2016    ALKPHOS 84 06/01/2016    AST 11 06/01/2016    ALT 8 06/01/2016    LABGLOM >60 04/10/2019    GFRAA >60 04/10/2019       No results found for: IRON, TIBC, FERRITIN        Radiology-    Xr Chest Portable    Result Date: 4/10/2019  LOCATION: 200 EXAM: XR CHEST PORTABLE COMPARISON: Prior stating back to January 2016 HISTORY: Shortness of breath TECHNIQUE: Single frontal view of the chest was obtained. FINDINGS:  SUPPORT DEVICES: None. LUNGS: There is increase in a paramediastinal opacity in the right upper lung zone measuring 4 cm in maximal dimension. This is concerning for recurrent malignancy. Left lung is clear. PLEURA: No effusions or pneumothorax. LUNG VOLUMES: Satisfactory inspirator effort. MEDIASTINAL STRUCTURES: No lymphadenopathy. Normal aortic contour. HEART SIZE: Normal. BONES AND SOFT TISSUES: No fracture or soft tissue abnormality. Increasing right paramediastinal opacity is concerning for recurrent malignancy. Chest CT should be obtained for further assessment. Cta Chest W Contrast    Result Date: 4/10/2019  LOCATION:200 EXAM: CTA CHEST W CONTRAST COMPARISON: December 29, 2017 chest CT HISTORY:  History of lung cancer shortness of breath, new lung lesion TECHNIQUE: Axial CT images are obtained of the chest.  Coronal and sagittal reconstructions were obtained with 3-D maximum intensity projection (MIP) reconstructed images.   These were performed on a separate workstation with concurrent supervision for detailed evaluation of the pulmonary arteries. CONTRAST: 80 mL Isovue-370 intravenous contrast. FINDINGS: SUPPORT DEVICES: None LUNGS/CENTRAL AIRWAYS/PLEURA: Postsurgical changes from right upper lobe lobectomy. There is increasing confluent airspace disease and soft tissue in the right lung apex at the level of the surgical staple line with adjacent surgical staples. The consolidation extends into the anterior aspect of the right upper lobe with volume loss of the middle lobe. The middle lobe bronchus is no longer visualized. The amount of confluent airspace disease in the area of the radiation field appears slightly increased from the previous study. There is moderate to severe emphysema throughout the left lung. No lung nodules are identified. PULMONARY ARTERIES: Evaluation is adequate for pulmonary embolus. No filling defects identified to the subsegmental level. HEART/PERICARDIUM/GREAT VESSELS: Cardiac size is normal.  There is no pericardial effusion. The great vessels of the chest are normal in caliber. LYMPH NODES: No thoracic adenopathy by size criteria. NECK BASE/CHEST WALL/DIAPHRAGM: No soft tissue lesions or diaphragmatic abnormality. UPPER ABDOMEN: Limited images through the upper abdomen are unremarkable. OSSEOUS STRUCTURES: No suspicious lytic or blastic lesions. No fractures. 1. There is new obstruction and volume loss of the middle lobe bronchus and middle lobe. This is concerning for localized recurrence at the level of the lobectomy site. Consider follow-up with direct visualization. 2. Slight increase in airspace disease involving the posterior aspect of the right lower lobe, likely infectious. 3. No new lung nodules or lymphadenopathy. ASSESSMENT/PLAN :    41-year-old woman with a history of squamous cell carcinoma of the right lung diagnosed in September 2015 status post right upper lobe lobectomy at [address] [place] [address].  She had positive surgical margins and N1 disease and went on to receive concurrent cis-platinum and etoposide with chest radiation followed by 2 cycles of consolidation with cis-platinum and etoposide  Her treatments back then with complicated by neutropenic fevers. She was treated and followed by Dr. Goldy Carey at [facility]    At this time there is strong suspicion of disease recurrence in the right lung    Pulmonary will be consulted for bronchoscopy and tissue sampling to establish recurrence  Her specimen to be sent for PD L1 expression    Her staging workup will be completed to include a CT scan of abdomen and pelvis as well as a bone scan    Once workup completed therapeutic options will be discussed. Will ask radiation Oncology to see ,to determine if any additional form of radiation can be done if local recurrence established        Isabella Truong M.D., F.A.C.P.   Electronically signed 4/10/2019 at 2:10 PM

## 2019-04-10 NOTE — ED PROVIDER NOTES
Patient is a 59-year-old female who presents with chief complaint of chest tightness and shortness of breath. Patient states that the past 3 days she is inexpensive and worsening shortness of breath, especially with exertion. She also admits to chest tightness. States that this happened suddenly 3 days ago. The patient initially had a dry cough and states that she just thought she was getting sick, but has now progressed to worsening shortness of breath. The patient was to mild lightheadedness when bending over. Denies any dizziness, diaphoresis, abdominal pain, nausea, vomiting, leg swelling. The patient admits to history of lung cancer and was treated with chemotherapy and radiation. The patient also had a partial lobectomy on the right. Patient denies any history of COPD or emphysema. She does smoke intermittently. No other recent illnesses. The history is provided by the patient. No  was used. Review of Systems   Constitutional: Negative for chills, fatigue and fever. HENT: Negative for congestion, rhinorrhea, sinus pressure, sinus pain, sneezing and sore throat. Eyes: Negative for photophobia and visual disturbance. Respiratory: Positive for cough, chest tightness and shortness of breath. Negative for wheezing. Cardiovascular: Negative for chest pain and palpitations. Gastrointestinal: Negative for abdominal pain, constipation, diarrhea, nausea and vomiting. Genitourinary: Negative for dysuria, frequency and hematuria. Musculoskeletal: Negative for back pain, neck pain and neck stiffness. Skin: Negative for rash and wound. Neurological: Positive for light-headedness. Negative for dizziness and headaches. Psychiatric/Behavioral: Negative for agitation, behavioral problems and confusion. Physical Exam   Constitutional: She is oriented to person, place, and time. She appears well-developed and well-nourished. No distress.    HENT:   Head: Normocephalic and atraumatic. Eyes: Conjunctivae are normal. Right eye exhibits no discharge. Left eye exhibits no discharge. No scleral icterus. Neck: Normal range of motion. Neck supple. Cardiovascular: Regular rhythm. Tachycardia present. No murmur heard. Pulmonary/Chest: Tachypnea noted. Patient is splinting, she is tachyneic. Patient's pulse ox is 95%-96% on room air. Diminished breath sounds throughout. No wheezes or rhonchi. Abdominal: Soft. Bowel sounds are normal. She exhibits no distension. There is no tenderness. Musculoskeletal: Normal range of motion. She exhibits no edema or tenderness. No lower extremity edema present. Lymphadenopathy:     She has no cervical adenopathy. Neurological: She is alert and oriented to person, place, and time. No cranial nerve deficit. Coordination normal.   Skin: Skin is warm. Capillary refill takes less than 2 seconds. She is not diaphoretic. Psychiatric: She has a normal mood and affect. Her behavior is normal.       Procedures    MDM    ED Course as of Apr 10 1219   Wed Apr 10, 2019   0812 ATTENDING PROVIDER ATTESTATION:     I have personally performed and/or participated in the history, exam, medical decision making, and procedures and agree with all pertinent clinical information unless otherwise noted. I have also reviewed and agree with the past medical, family and social history unless otherwise noted. I have discussed this patient in detail with the resident, and provided the instruction and education regarding patient here complaining of shortness of breath. She was getting a cold with some coughing and slight congestion but developed general anterior chest pain worsened with deep breathing and coughing. No leg pain or swelling. No lightheadedness or syncope. No abdominal pain. .  My findings/plan: Patient in no distress, hoarse voice, mild postnasal drainage noted. Mild general from general erythema no soft tissue swelling or trismus or stridor. Lungs show some trace scattered wheezing, moving good air bilaterally and is in no respiratory distress but is having intermittent splinting with breathing. She has no pretibial edema or calf pain in her abdomen is soft and nontender.          [NC]      ED Course User Index  [NC] Marnie Kelly, DO          --------------------------------------------- PAST HISTORY ---------------------------------------------  Past Medical History:  has a past medical history of Cancer ([facility] Depression, Pneumonia, Rheumatoid arthritis ([facility] and Rheumatoid arthritis involving multiple sites with positive rheumatoid factor ([facility] Past Surgical History:  has a past surgical history that includes Tubal ligation; Bladder surgery; other surgical history (Right, 04/03/15); Hysterectomy; and Lung cancer surgery (Right, 2015). Social History:  reports that she has quit smoking. She smoked 1.00 pack per day. She has never used smokeless tobacco. She reports that she drinks alcohol. She reports that she does not use drugs. Family History: family history is not on file. The patients home medications have been reviewed. Allergies: Patient has no known allergies.     -------------------------------------------------- RESULTS -------------------------------------------------    LABS:  Results for orders placed or performed during the hospital encounter of 04/10/19   Rapid influenza A/B antigens   Result Value Ref Range    Influenza A by PCR Not Detected Not Detected    Influenza B by PCR Not Detected Not Detected   CBC Auto Differential   Result Value Ref Range    WBC 10.6 4.5 - 11.5 E9/L    RBC 4.73 3.50 - 5.50 E12/L    Hemoglobin 13.2 11.5 - 15.5 g/dL    Hematocrit 40.5 34.0 - 48.0 %    MCV 85.6 80.0 - 99.9 fL    MCH 27.9 26.0 - 35.0 pg    MCHC 32.6 32.0 - 34.5 %    RDW 14.1 11.5 - 15.0 fL    Platelets 374 925 - 839 E9/L    MPV 10.4 7.0 - 12.0 fL    Neutrophils % 85.7 (H) 43.0 - 80.0 %    Immature Granulocytes % 0.4 0.0 - 5.0 %    Lymphocytes % 7.8 (L) 20.0 - 42.0 %    Monocytes % 5.6 2.0 - 12.0 %    Eosinophils % 0.1 0.0 - 6.0 %    Basophils % 0.4 0.0 - 2.0 %    Neutrophils # 9.09 (H) 1.80 - 7.30 E9/L    Immature Granulocytes # 0.04 E9/L    Lymphocytes # 0.83 (L) 1.50 - 4.00 E9/L    Monocytes # 0.59 0.10 - 0.95 E9/L    Eosinophils # 0.01 (L) 0.05 - 0.50 E9/L    Basophils # 0.04 0.00 - 0.20 G4/F   Basic Metabolic Panel   Result Value Ref Range    Sodium 136 132 - 146 mmol/L    Potassium 3.6 3.5 - 5.0 mmol/L    Chloride 98 98 - 107 mmol/L    CO2 26 22 - 29 mmol/L    Anion Gap 12 7 - 16 mmol/L    Glucose 95 74 - 99 mg/dL    BUN 8 6 - 20 mg/dL    CREATININE 0.7 0.5 - 1.0 mg/dL    GFR Non-African American >60 >=60 mL/min/1.73    GFR African American >60     Calcium 9.9 8.6 - 10.2 mg/dL   Troponin   Result Value Ref Range    Troponin <0.01 0.00 - 0.03 ng/mL   EKG 12 Lead   Result Value Ref Range    Ventricular Rate 113 BPM    Atrial Rate 113 BPM    P-R Interval 128 ms    QRS Duration 68 ms    Q-T Interval 332 ms    QTc Calculation (Bazett) 455 ms    P Axis 58 degrees    R Axis 41 degrees    T Axis 56 degrees       RADIOLOGY:  Xr Chest Portable    Result Date: 4/10/2019  LOCATION: 200 EXAM: XR CHEST PORTABLE COMPARISON: Prior stating back to January 2016 HISTORY: Shortness of breath TECHNIQUE: Single frontal view of the chest was obtained. FINDINGS:  SUPPORT DEVICES: None. LUNGS: There is increase in a paramediastinal opacity in the right upper lung zone measuring 4 cm in maximal dimension. This is concerning for recurrent malignancy. Left lung is clear. PLEURA: No effusions or pneumothorax. LUNG VOLUMES: Satisfactory inspirator effort. MEDIASTINAL STRUCTURES: No lymphadenopathy. Normal aortic contour. HEART SIZE: Normal. BONES AND SOFT TISSUES: No fracture or soft tissue abnormality. Increasing right paramediastinal opacity is concerning for recurrent malignancy. Chest CT should be obtained for further assessment.     [id number] Contrast    Result Date: 4/10/2019  LOCATION:200 EXAM: CTA CHEST W CONTRAST COMPARISON: December 29, 2017 chest CT HISTORY:  History of lung cancer shortness of breath, new lung lesion TECHNIQUE: Axial CT images are obtained of the chest.  Coronal and sagittal reconstructions were obtained with 3-D maximum intensity projection (MIP) reconstructed images. These were performed on a separate workstation with concurrent supervision for detailed evaluation of the pulmonary arteries. CONTRAST: 80 mL Isovue-370 intravenous contrast. FINDINGS: SUPPORT DEVICES: None LUNGS/CENTRAL AIRWAYS/PLEURA: Postsurgical changes from right upper lobe lobectomy. There is increasing confluent airspace disease and soft tissue in the right lung apex at the level of the surgical staple line with adjacent surgical staples. The consolidation extends into the anterior aspect of the right upper lobe with volume loss of the middle lobe. The middle lobe bronchus is no longer visualized. The amount of confluent airspace disease in the area of the radiation field appears slightly increased from the previous study. There is moderate to severe emphysema throughout the left lung. No lung nodules are identified. PULMONARY ARTERIES: Evaluation is adequate for pulmonary embolus. No filling defects identified to the subsegmental level. HEART/PERICARDIUM/GREAT VESSELS: Cardiac size is normal.  There is no pericardial effusion. The great vessels of the chest are normal in caliber. LYMPH NODES: No thoracic adenopathy by size criteria. NECK BASE/CHEST WALL/DIAPHRAGM: No soft tissue lesions or diaphragmatic abnormality. UPPER ABDOMEN: Limited images through the upper abdomen are unremarkable. OSSEOUS STRUCTURES: No suspicious lytic or blastic lesions. No fractures. 1. There is new obstruction and volume loss of the middle lobe bronchus and middle lobe. This is concerning for localized recurrence at the level of the lobectomy site.  Consider follow-up with direct visualization. 2. Slight increase in airspace disease involving the posterior aspect of the right lower lobe, likely infectious. 3. No new lung nodules or lymphadenopathy. EKG: This EKG is signed and interpreted by me. Rate: 113  Rhythm: Sinus  Interpretation: Sinus tachycardia, no acute ischemic changes. Comparison: Patient is not tachycardic, but no acute ischemic changes from comparison EKG on 10/13/16.      ------------------------- NURSING NOTES AND VITALS REVIEWED ---------------------------  The nursing notes within the ED encounter and vital signs as below have been reviewed. Patient Vitals for the past 24 hrs:   BP Temp Temp src Pulse Resp SpO2 Height Weight   04/10/19 1136 119/80 -- -- 108 -- 94 % -- --   04/10/19 0956 111/77 -- -- 109 24 96 % -- --   04/10/19 0753 110/75 100.3 °F (37.9 °C) Oral 129 (!) 36 95 % 5' 2\" (1.575 m) 145 lb (65.8 kg)       Oxygen Saturation Interpretation: Normal    ------------------------------------------ PROGRESS NOTES ------------------------------------------  Re-evaluation(s):  Time: 11:39. Discussed the results with the patient. She is aware that there is a recurrent mass in her right upper lobe. The patient states that she does feel better after the breathing treatments. She is currently receiving antibiotics. She is     Counseling:  I have spoken with the patient and discussed todays results, in addition to providing specific details for the plan of care and counseling regarding the diagnosis and prognosis. Their questions are answered at this time and they are agreeable with the plan of admission.    --------------------------------- ADDITIONAL PROVIDER NOTES ---------------------------------  Consultations:  Time: 11:47. Spoke with Dr. Carlin Da Silva. Discussed case. They will admit the patient.   This patient's ED course included: a personal history and physicial examination, re-evaluation prior to disposition, IV medications, cardiac monitoring and

## 2019-04-10 NOTE — CONSULTS
04/10/2019    CO2 26 04/10/2019    BUN 8 04/10/2019    CREATININE 0.7 04/10/2019    GFRAA >60 04/10/2019    LABGLOM >60 04/10/2019    GLUCOSE 95 04/10/2019    PROT 7.9 06/01/2016    LABALBU 3.6 06/01/2016    CALCIUM 9.9 04/10/2019    BILITOT 0.3 06/01/2016    ALKPHOS 84 06/01/2016    AST 11 06/01/2016    ALT 8 06/01/2016     Magnesium:  No results found for: MG  Phosphorus:  No results found for: PHOS  LDH:  No results found for: LDH  PT/INR:    Lab Results   Component Value Date    PROTIME 11.4 01/04/2016    INR 1.1 01/04/2016     Troponin:    Lab Results   Component Value Date    TROPONINI <0.01 04/10/2019     ABG:  No results found for: PH, PCO2, PO2, HCO3, BE, THGB, TCO2, O2SAT  HgBA1c:  No results found for: LABA1C      RADIOLOGY:  CTA CHEST W CONTRAST   Final Result   1. There is new obstruction and volume loss of the middle lobe   bronchus and middle lobe. This is concerning for localized recurrence   at the level of the lobectomy site. Consider follow-up with direct   visualization. 2. Slight increase in airspace disease involving the posterior aspect   of the right lower lobe, likely infectious. 3. No new lung nodules or lymphadenopathy. XR CHEST PORTABLE   Final Result   Increasing right paramediastinal opacity is concerning for recurrent   malignancy. Chest CT should be obtained for further assessment. PROBLEM LIST:  Principal Problem:    Lung mass  Active Problems:    Community acquired bacterial pneumonia  Resolved Problems:    * No resolved hospital problems. *      ASSESSMENT/PLAN:  Patient likely has recurrent lung cancer and will need bronch + biopsies (FNA? ) for confirmation. Since the patient had all of her care done at [address] [place] [address] she is considering going there for the procedure. Of course all of this can only take place once the acute episode (pneumonia) has resolved.      * Plan therefore is to have patient discuss with her  and primary team  * Pt to call pulmonary team if

## 2019-04-10 NOTE — H&P
Department of Internal Medicine  History and Physical    PCP: Dr. Hannah Farias   Admitting Physician: Dr. Cyrus Osler  Consultants: Pulmonology      CHIEF COMPLAINT:  SOB    HISTORY OF PRESENT ILLNESS:    Joseluis Velázquez is a very polite and pleasant 67-year-old female who presented to [address] [place] [address] emergency department for the evaluation of shortness of breath with pain on deep inspiration with intractable coughing. 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 She is followed serially by the oncologic and pulmonary teams at [facility] and last underwent PET scan approximately 6 months ago. She was presumed to be in remission. Upon presentation to the emergency department, CT of the chest is indicating a new obstruction and volume loss of the middle lobe bronchus on the right concerning for localized recurrence. We discussed possible transfer back to [facility] but she would like to maintain hospitalization here. The pulmonary and oncologic teams will provide consultation. She admits to extreme anxiety as expected currently. This will be addressed as well. PAST MEDICAL Hx:  Past Medical History:   Diagnosis Date    Cancer ([facility] lung    Depression 6/22/2016    Pneumonia     Rheumatoid arthritis ([facility]     Rheumatoid arthritis involving multiple sites with positive rheumatoid factor ([facility] 9/7/2018       PAST SURGICAL Hx:   Past Surgical History:   Procedure Laterality Date    BLADDER SURGERY      HYSTERECTOMY      LUNG CANCER SURGERY Right 2015    OTHER SURGICAL HISTORY Right 04/03/15    retro calconeal heel spur excision with speed bridge right foot    TUBAL LIGATION         FAMILY Hx:  History reviewed. No pertinent family history. HOME MEDICATIONS:  Prior to Admission medications    Medication Sig Start Date End Date Taking?  Authorizing Provider   influenza use: No    Sexual activity: Yes     Partners: Male   Lifestyle    Physical activity:     Days per week: Not on file     Minutes per session: Not on file    Stress: Not on file   Relationships    Social connections:     Talks on phone: Not on file     Gets together: Not on file     Attends Orthodoxy service: Not on file     Active member of club or organization: Not on file     Attends meetings of clubs or organizations: Not on file     Relationship status: Not on file    Intimate partner violence:     Fear of current or ex partner: Not on file     Emotionally abused: Not on file     Physically abused: Not on file     Forced sexual activity: Not on file   Other Topics Concern    Not on file   Social History Narrative    Not on file       ROS:  General:   Denies chills, fatigue, fever, malaise, night sweats or weight loss    Psychological:   Denies anxiety, disorientation or hallucinations    ENT:    Denies epistaxis, headaches, vertigo or visual changes    Cardiovascular:   Denies any chest pain, irregular heartbeats, or palpitations. No paroxysmal nocturnal dyspnea. Respiratory:   Shortness of breath with coughing. No significant sputum production. No hemoptysis. Gastrointestinal:   Denies nausea, vomiting, diarrhea, or constipation. Denies any abdominal pain. Denies change in bowel habits or stools. Genito-Urinary:    Denies any urgency, frequency, hematuria. Voiding without difficulty. Musculoskeletal:   Denies joint pain, joint stiffness, joint swelling or muscle pain    Neurology:    Denies any headache or focal neurological deficits. No weakness or paresthesia. Derm:    Denies any rashes, ulcers, or excoriations. Denies bruising. Extremities:   Denies any lower extremity swelling or edema.       PHYSICAL EXAM:  VITALS:  Vitals:    04/10/19 1136   BP: 119/80   Pulse: 108   Resp:    Temp:    SpO2: 94%         CONSTITUTIONAL:    Awake, alert, cooperative, no apparent distress, and appears stated age    EYES:    PERRL, EOMI, sclera clear, conjunctiva normal    ENT:    Normocephalic, atraumatic, sinuses nontender on palpation. External ears without lesions. Oral pharynx with moist mucus membranes. Tonsils without erythema or exudates. NECK:    Supple, symmetrical, trachea midline, no adenopathy, thyroid symmetric, not enlarged and no tenderness, skin normal, no bruits, no JVD    HEMATOLOGIC/LYMPHATICS:    No cervical lymphadenopathy and no supraclavicular lymphadenopathy    LUNGS:    Symmetric. No increased work of breathing, good air exchange, clear to auscultation bilaterally, no wheezes, rhonchi, or rales,     CARDIOVASCULAR:    Normal apical impulse, regular rate and rhythm, normal S1 and S2, no S3 or S4, and no murmur noted    ABDOMEN:    No scars, normal bowel sounds, soft, non-distended, non-tender, no masses palpated, no hepatosplenomegaly, no rebound or guarding elicited on palpation     MUSCULOSKELETAL:    There is no redness, warmth, or swelling of the joints. Full range of motion noted. Motor strength is 5 out of 5 all extremities bilaterally. Tone is normal.    NEUROLOGIC:    Awake, alert, oriented to name, place and time. Cranial nerves II-XII are grossly intact. Motor is 5 out of 5 bilaterally. SKIN:    No bruising or bleeding. No redness, warmth, or swelling    EXTREMITIES:    Peripheral pulses present. No edema, cyanosis, or swelling. OSTEOPATHIC:    Examined in seated and supine positions. Normal thoracic kyphosis and lumbar lordosis. No acute somatic dysfunction.     LABORATORY DATA:  CBC with Differential:    Lab Results   Component Value Date    WBC 10.6 04/10/2019    RBC 4.73 04/10/2019    HGB 13.2 04/10/2019    HCT 40.5 04/10/2019     04/10/2019    MCV 85.6 04/10/2019    MCH 27.9 04/10/2019    MCHC 32.6 04/10/2019    RDW 14.1 04/10/2019    NRBC 1 01/06/2016    BANDSPCT 8 01/07/2016    LYMPHOPCT 7.8 04/10/2019    MONOPCT 5.6 04/10/2019    BASOPCT 0.4 04/10/2019    MONOSABS 0.59 04/10/2019    LYMPHSABS 0.83 04/10/2019    EOSABS 0.01 04/10/2019    BASOSABS 0.04 04/10/2019     BMP:    Lab Results   Component Value Date     04/10/2019    K 3.6 04/10/2019    CL 98 04/10/2019    CO2 26 04/10/2019    BUN 8 04/10/2019    LABALBU 3.6 06/01/2016    CREATININE 0.7 04/10/2019    CALCIUM 9.9 04/10/2019    GFRAA >60 04/10/2019    LABGLOM >60 04/10/2019    GLUCOSE 95 04/10/2019       ASSESSMENT:  1. Suspected recurrence of small cell lung carcinoma at the site of previous lobectomy in the setting of right upper lung lobectomy followed by adjuvant chemotherapy and radiation  2. History of tobacco abuse  3. Rheumatoid arthritis    PLAN:  There is a great deal of concern for the recurrence of her previous lung cancer. She is requesting to maintain hospitalization here at [address] [place] [address]. The pulmonary and oncologic teams will provide consultation and I anticipate the need for bronchoscopic intervention. Nebulized respiratory medications and antibiotics will be employed for postobstructive pneumonia. IV corticosteroids will be instituted. We will utilize Ativan as needed for her underlying anxiety. I will speak to her family when they present to the bedside. She is very appreciative of the care she is currently receiving.     Solo Landis D.O., Bianca Álvarez  12:13 PM  4/10/2019    Electronically signed by Solo Landis DO on 4/10/19 at 12:13 PM

## 2019-04-11 ENCOUNTER — APPOINTMENT (OUTPATIENT)
Dept: CT IMAGING | Age: 49
DRG: 194 | End: 2019-04-11
Payer: COMMERCIAL

## 2019-04-11 ENCOUNTER — APPOINTMENT (OUTPATIENT)
Dept: NUCLEAR MEDICINE | Age: 49
DRG: 194 | End: 2019-04-11
Payer: COMMERCIAL

## 2019-04-11 LAB
ALBUMIN SERPL-MCNC: 3.6 G/DL (ref 3.5–5.2)
ALP BLD-CCNC: 93 U/L (ref 35–104)
ALT SERPL-CCNC: 7 U/L (ref 0–32)
ANION GAP SERPL CALCULATED.3IONS-SCNC: 12 MMOL/L (ref 7–16)
AST SERPL-CCNC: 10 U/L (ref 0–31)
BILIRUB SERPL-MCNC: 0.3 MG/DL (ref 0–1.2)
BUN BLDV-MCNC: 10 MG/DL (ref 6–20)
CALCIUM SERPL-MCNC: 9.1 MG/DL (ref 8.6–10.2)
CHLORIDE BLD-SCNC: 105 MMOL/L (ref 98–107)
CO2: 23 MMOL/L (ref 22–29)
CREAT SERPL-MCNC: 0.5 MG/DL (ref 0.5–1)
EKG ATRIAL RATE: 113 BPM
EKG P AXIS: 58 DEGREES
EKG P-R INTERVAL: 128 MS
EKG Q-T INTERVAL: 332 MS
EKG QRS DURATION: 68 MS
EKG QTC CALCULATION (BAZETT): 455 MS
EKG R AXIS: 41 DEGREES
EKG T AXIS: 56 DEGREES
EKG VENTRICULAR RATE: 113 BPM
FILM ARRAY ADENOVIRUS: NORMAL
FILM ARRAY BORDETELLA PERTUSSIS: NORMAL
FILM ARRAY CHLAMYDOPHILIA PNEUMONIAE: NORMAL
FILM ARRAY CORONAVIRUS 229E: NORMAL
FILM ARRAY CORONAVIRUS HKU1: NORMAL
FILM ARRAY CORONAVIRUS NL63: NORMAL
FILM ARRAY CORONAVIRUS OC43: NORMAL
FILM ARRAY INFLUENZA A VIRUS 09H1: NORMAL
FILM ARRAY INFLUENZA A VIRUS H1: NORMAL
FILM ARRAY INFLUENZA A VIRUS H3: NORMAL
FILM ARRAY INFLUENZA A VIRUS: NORMAL
FILM ARRAY INFLUENZA B: NORMAL
FILM ARRAY METAPNEUMOVIRUS: NORMAL
FILM ARRAY MYCOPLASMA PNEUMONIAE: NORMAL
FILM ARRAY PARAINFLUENZA VIRUS 1: NORMAL
FILM ARRAY PARAINFLUENZA VIRUS 2: NORMAL
FILM ARRAY PARAINFLUENZA VIRUS 3: NORMAL
FILM ARRAY PARAINFLUENZA VIRUS 4: NORMAL
FILM ARRAY RESPIRATORY SYNCITIAL VIRUS: NORMAL
FILM ARRAY RHINOVIRUS/ENTEROVIRUS: NORMAL
GFR AFRICAN AMERICAN: >60
GFR NON-AFRICAN AMERICAN: >60 ML/MIN/1.73
GLUCOSE BLD-MCNC: 116 MG/DL (ref 74–99)
HCT VFR BLD CALC: 35.6 % (ref 34–48)
HEMOGLOBIN: 11.5 G/DL (ref 11.5–15.5)
INR BLD: 1.2
L. PNEUMOPHILA SEROGP 1 UR AG: NORMAL
MAGNESIUM: 2.1 MG/DL (ref 1.6–2.6)
MCH RBC QN AUTO: 28.2 PG (ref 26–35)
MCHC RBC AUTO-ENTMCNC: 32.3 % (ref 32–34.5)
MCV RBC AUTO: 87.3 FL (ref 80–99.9)
PDW BLD-RTO: 14.6 FL (ref 11.5–15)
PHOSPHORUS: 2.4 MG/DL (ref 2.5–4.5)
PLATELET # BLD: 227 E9/L (ref 130–450)
PMV BLD AUTO: 11.1 FL (ref 7–12)
POTASSIUM SERPL-SCNC: 3.8 MMOL/L (ref 3.5–5)
PROTHROMBIN TIME: 13.7 SEC (ref 9.3–12.4)
RBC # BLD: 4.08 E12/L (ref 3.5–5.5)
SODIUM BLD-SCNC: 140 MMOL/L (ref 132–146)
STREP PNEUMONIAE ANTIGEN, URINE: NORMAL
TOTAL PROTEIN: 7.3 G/DL (ref 6.4–8.3)
TSH SERPL DL<=0.05 MIU/L-ACNC: 2.68 UIU/ML (ref 0.27–4.2)
WBC # BLD: 12.9 E9/L (ref 4.5–11.5)

## 2019-04-11 PROCEDURE — 2580000003 HC RX 258: Performed by: INTERNAL MEDICINE

## 2019-04-11 PROCEDURE — 6360000002 HC RX W HCPCS: Performed by: INTERNAL MEDICINE

## 2019-04-11 PROCEDURE — 6370000000 HC RX 637 (ALT 250 FOR IP): Performed by: INTERNAL MEDICINE

## 2019-04-11 PROCEDURE — 84443 ASSAY THYROID STIM HORMONE: CPT

## 2019-04-11 PROCEDURE — 80053 COMPREHEN METABOLIC PANEL: CPT

## 2019-04-11 PROCEDURE — 78306 BONE IMAGING WHOLE BODY: CPT

## 2019-04-11 PROCEDURE — 85027 COMPLETE CBC AUTOMATED: CPT

## 2019-04-11 PROCEDURE — 84100 ASSAY OF PHOSPHORUS: CPT

## 2019-04-11 PROCEDURE — 94640 AIRWAY INHALATION TREATMENT: CPT

## 2019-04-11 PROCEDURE — 83735 ASSAY OF MAGNESIUM: CPT

## 2019-04-11 PROCEDURE — 74177 CT ABD & PELVIS W/CONTRAST: CPT

## 2019-04-11 PROCEDURE — 2060000000 HC ICU INTERMEDIATE R&B

## 2019-04-11 PROCEDURE — 36415 COLL VENOUS BLD VENIPUNCTURE: CPT

## 2019-04-11 PROCEDURE — 3430000000 HC RX DIAGNOSTIC RADIOPHARMACEUTICAL: Performed by: RADIOLOGY

## 2019-04-11 PROCEDURE — A9503 TC99M MEDRONATE: HCPCS | Performed by: RADIOLOGY

## 2019-04-11 PROCEDURE — 6360000004 HC RX CONTRAST MEDICATION: Performed by: RADIOLOGY

## 2019-04-11 PROCEDURE — 85610 PROTHROMBIN TIME: CPT

## 2019-04-11 RX ORDER — SULFASALAZINE 500 MG/1
1000 TABLET ORAL 2 TIMES DAILY
Status: DISCONTINUED | OUTPATIENT
Start: 2019-04-11 | End: 2019-04-13 | Stop reason: HOSPADM

## 2019-04-11 RX ORDER — TC 99M MEDRONATE 20 MG/10ML
25 INJECTION, POWDER, LYOPHILIZED, FOR SOLUTION INTRAVENOUS
Status: COMPLETED | OUTPATIENT
Start: 2019-04-11 | End: 2019-04-11

## 2019-04-11 RX ORDER — METHYLPREDNISOLONE SODIUM SUCCINATE 40 MG/ML
40 INJECTION, POWDER, LYOPHILIZED, FOR SOLUTION INTRAMUSCULAR; INTRAVENOUS EVERY 8 HOURS
Status: DISCONTINUED | OUTPATIENT
Start: 2019-04-11 | End: 2019-04-13 | Stop reason: HOSPADM

## 2019-04-11 RX ADMIN — TC 99M MEDRONATE 25 MILLICURIE: 20 INJECTION, POWDER, LYOPHILIZED, FOR SOLUTION INTRAVENOUS at 08:54

## 2019-04-11 RX ADMIN — SODIUM CHLORIDE: 9 INJECTION, SOLUTION INTRAVENOUS at 03:59

## 2019-04-11 RX ADMIN — BENZONATATE 100 MG: 100 CAPSULE ORAL at 06:11

## 2019-04-11 RX ADMIN — ENOXAPARIN SODIUM 40 MG: 100 INJECTION SUBCUTANEOUS at 14:14

## 2019-04-11 RX ADMIN — SULFASALAZINE 1000 MG: 500 TABLET ORAL at 20:14

## 2019-04-11 RX ADMIN — IOPAMIDOL 80 ML: 755 INJECTION, SOLUTION INTRAVENOUS at 12:33

## 2019-04-11 RX ADMIN — Medication 10 ML: at 20:15

## 2019-04-11 RX ADMIN — IPRATROPIUM BROMIDE AND ALBUTEROL SULFATE 1 AMPULE: .5; 3 SOLUTION RESPIRATORY (INHALATION) at 09:21

## 2019-04-11 RX ADMIN — METHYLPREDNISOLONE SODIUM SUCCINATE 80 MG: 125 INJECTION, POWDER, FOR SOLUTION INTRAMUSCULAR; INTRAVENOUS at 14:14

## 2019-04-11 RX ADMIN — IPRATROPIUM BROMIDE AND ALBUTEROL SULFATE 1 AMPULE: .5; 3 SOLUTION RESPIRATORY (INHALATION) at 18:54

## 2019-04-11 RX ADMIN — WATER 1 G: 1 INJECTION INTRAMUSCULAR; INTRAVENOUS; SUBCUTANEOUS at 11:04

## 2019-04-11 RX ADMIN — LORAZEPAM 1 MG: 2 INJECTION INTRAMUSCULAR; INTRAVENOUS at 22:47

## 2019-04-11 RX ADMIN — METHYLPREDNISOLONE SODIUM SUCCINATE 40 MG: 40 INJECTION, POWDER, FOR SOLUTION INTRAMUSCULAR; INTRAVENOUS at 20:13

## 2019-04-11 RX ADMIN — SODIUM CHLORIDE: 9 INJECTION, SOLUTION INTRAVENOUS at 22:47

## 2019-04-11 RX ADMIN — METHYLPREDNISOLONE SODIUM SUCCINATE 80 MG: 125 INJECTION, POWDER, FOR SOLUTION INTRAMUSCULAR; INTRAVENOUS at 06:01

## 2019-04-11 RX ADMIN — AZITHROMYCIN MONOHYDRATE 500 MG: 250 TABLET ORAL at 11:04

## 2019-04-11 ASSESSMENT — ENCOUNTER SYMPTOMS
SORE THROAT: 0
EYE DISCHARGE: 0
EYE PAIN: 0
SINUS PRESSURE: 0
CONSTIPATION: 0
DIARRHEA: 0
COUGH: 1
WHEEZING: 1
BACK PAIN: 0
VOMITING: 0
NAUSEA: 0
ABDOMINAL PAIN: 0
SHORTNESS OF BREATH: 1

## 2019-04-11 ASSESSMENT — PAIN SCALES - GENERAL
PAINLEVEL_OUTOF10: 0

## 2019-04-11 NOTE — PROGRESS NOTES
Consults   Hematology/Oncology  Consult      Patient Name: Hank Banegas  YOB: 1970  PCP: Brooklyn Akins DO   Referring Provider:      Reason for Consultation:   Chief Complaint   Patient presents with    Shortness of Breath     pt to er via ambulatory was at work c/o of sob jasiel 2 days states hx of lung ca 2015         Subjective:  No acute events overnight, no new complaints this afternoon    History of Present Illness:  31-year-old woman with history of lung cancer. 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 She was treated at [facility] by Dr. Olesya Gonzales . She received 2 concurrent cycles of cisplatin and etoposide with chest radiation followed by consolidation with 2 additional cycles of chemotherapy. She did well for 3 years and was followed with surveillance and apparently her CT had shown a nonspecific sclerotic lesion at the level of the 3rd rib. She had developed an episode of herpes zoster last year  She has history of rheumatoid arthritis and was treated with Plaquenil and prednisone      She is now hospitalized through the emergency room with chest wall pain along with shortness of breath. Follow-up CT chest shows new area of obstruction and volume loss in the middle lobe highly suspicious for disease recurrence with airspace disease in the right lower lobe likely pneumonia.       Diagnostic Data:     Past Medical History:   Diagnosis Date    Cancer [facility]) lung    Depression 6/22/2016    Pneumonia     Rheumatoid arthritis ([facility]     Rheumatoid arthritis involving multiple sites with positive rheumatoid factor ([facility] 9/7/2018 Patient Active Problem List    Diagnosis Date Noted    Neutropenic fever ([facility] 01/04/2016     Priority: High    Heel spur 04/03/2015     Priority: Low     Class: Present on Admission    Achilles tendinitis of left lower extremity 04/03/2015     Priority: Low     Class: Present on Admission    Community acquired bacterial pneumonia 04/10/2019    Lung mass 04/10/2019    Rheumatoid arthritis involving multiple sites with positive rheumatoid factor ([facility] 09/07/2018    Depression 06/22/2016    Arthralgia 05/31/2016    Arthralgia 05/03/2016    Localized edema 04/25/2016    Tenosynovitis, de Quervain 04/01/2016    Acute nasopharyngitis 03/14/2016    Poor venous access 02/12/2016    Bronchogenic carcinoma of right lung ([facility] 01/14/2016    Abscess of left upper extremity 01/08/2016        Past Surgical History:   Procedure Laterality Date    BLADDER SURGERY      HYSTERECTOMY      LUNG CANCER SURGERY Right 2015    OTHER SURGICAL HISTORY Right 04/03/15    retro calconeal heel spur excision with speed bridge right foot    TUBAL LIGATION         Family History  History reviewed. No pertinent family history. Social History    TOBACCO:   reports that she has quit smoking. She smoked 1.00 pack per day. She has never used smokeless tobacco.  ETOH:   reports that she drinks alcohol. Home Medications  Prior to Admission medications    Medication Sig Start Date End Date Taking? Authorizing Provider   influenza virus trivalent vaccine (FLUZONE) injection Inject 0.5 mLs into the muscle once Given 9/7/2018   Yes Historical Provider, MD   sulfaSALAzine (AZULFIDINE) 500 MG tablet Take 1,000 mg by mouth 2 times daily   Yes Historical Provider, MD   Ascorbic Acid (VITAMIN C PO) Take 1 tablet by mouth daily   Yes Historical Provider, MD   HYDROcodone-acetaminophen (NORCO) 5-325 MG per tablet Take 1 tablet by mouth every 8 hours as needed for Pain for up to 30 days.  3/29/19 4/28/19 Yes ALLEN Denney - CNP sertraline (ZOLOFT) 50 MG tablet Take 1 tablet by mouth daily 12/27/18  Yes Kristyn Flores APRN - CNP   albuterol sulfate  (90 Base) MCG/ACT inhaler Inhale 2 puffs into the lungs every 6 hours as needed for Wheezing 12/27/18  Yes Shannon Mcintyre APRN - CNP   budesonide-formoterol (SYMBICORT) 160-4.5 MCG/ACT AERO Inhale 2 puffs into the lungs 2 times daily  Patient taking differently: Inhale 2 puffs into the lungs 2 times daily as needed (Shortness of Breath/Wheezing)  12/27/18  Yes Anthony Flores APRN - CNP   alendronate (FOSAMAX) 70 MG tablet Take 70 mg by mouth once a week Monday   Yes Historical Provider, MD   hydroxychloroquine (PLAQUENIL) 200 MG tablet Take 200 mg by mouth daily   Yes Historical Provider, MD       Allergies  No Known Allergies    Review of Systems:  Relevant for progressively worsening shortness of breath with occasional cough atypical chest pain and discomfort as well as occasional joint aches  No weight loss    Objective  /62   Pulse 93   Temp 97.6 °F (36.4 °C) (Axillary)   Resp 16   Ht 5' 2\" (1.575 m)   Wt 145 lb (65.8 kg)   LMP  (LMP Unknown)   SpO2 95%   BMI 26.52 kg/m²     Physical Performance Status : 0    Physical Exam:  General: AAO to person, place, time,no acute distress, pleasant   Head and neck : PERRLA, EOMI . Sclera non icteric. Oropharynx : Clear  Neck: no JVD,  no adenopathy  Lungs: Decreased breath sounds in right lung fields with scattered rhonchi  Heart: Regular rate and regular rhythm,  Abdomen: Soft, non-tender;no masses, no organomegaly  Extremities: No edema,no cyanosis, no clubbing. Skin:  No rash. Neurologic:Cranial nerves grossly intact. No focal motor or sensory deficits .     Recent Laboratory Data-   Lab Results   Component Value Date    WBC 12.9 (H) 04/11/2019    HGB 11.5 04/11/2019    HCT 35.6 04/11/2019    MCV 87.3 04/11/2019     04/11/2019    LYMPHOPCT 7.8 (L) 04/10/2019    RBC 4.08 04/11/2019    MCH 28.2 04/11/2019    [facility] These were performed on a separate workstation with concurrent supervision for detailed evaluation of the pulmonary arteries. CONTRAST: 80 mL Isovue-370 intravenous contrast. FINDINGS: SUPPORT DEVICES: None LUNGS/CENTRAL AIRWAYS/PLEURA: Postsurgical changes from right upper lobe lobectomy. There is increasing confluent airspace disease and soft tissue in the right lung apex at the level of the surgical staple line with adjacent surgical staples. The consolidation extends into the anterior aspect of the right upper lobe with volume loss of the middle lobe. The middle lobe bronchus is no longer visualized. The amount of confluent airspace disease in the area of the radiation field appears slightly increased from the previous study. There is moderate to severe emphysema throughout the left lung. No lung nodules are identified. PULMONARY ARTERIES: Evaluation is adequate for pulmonary embolus. No filling defects identified to the subsegmental level. HEART/PERICARDIUM/GREAT VESSELS: Cardiac size is normal.  There is no pericardial effusion. The great vessels of the chest are normal in caliber. LYMPH NODES: No thoracic adenopathy by size criteria. NECK BASE/CHEST WALL/DIAPHRAGM: No soft tissue lesions or diaphragmatic abnormality. UPPER ABDOMEN: Limited images through the upper abdomen are unremarkable. OSSEOUS STRUCTURES: No suspicious lytic or blastic lesions. No fractures. 1. There is new obstruction and volume loss of the middle lobe bronchus and middle lobe. This is concerning for localized recurrence at the level of the lobectomy site. Consider follow-up with direct visualization. 2. Slight increase in airspace disease involving the posterior aspect of the right lower lobe, likely infectious. 3. No new lung nodules or lymphadenopathy.         ASSESSMENT/PLAN :  50-year-old woman with a history of squamous cell carcinoma of the right lung diagnosed in September 2015 status post right upper lobe lobectomy at [address] [place] [address]. She had positive surgical margins and N1 disease and went on to receive concurrent cis-platinum and etoposide with chest radiation followed by 2 cycles of consolidation with cis-platinum and etoposide  Her treatments back then with complicated by neutropenic fevers. She was treated and followed by Dr. Fercho Salter at [facility] suspicion of disease recurrence in the right lung  - Pulmonary will be consulted for bronchoscopy and tissue sampling to establish recurrence  Her specimen to be sent for PD L1 expression. Patient may elect to have this done at [address] [place] [address] A/P pending  - NM Bone scan negative for osseous involvement   - Radiation Oncology consulted.  Will determine if any additional form of radiation can be done if local recurrence established      Estee Tyler MD  Electronically signed 4/11/2019 at 1:45 PM

## 2019-04-11 NOTE — PLAN OF CARE
Problem: Falls - Risk of:  Goal: Will remain free from falls  Description  Will remain free from falls  Outcome: Met This Shift  Goal: Absence of physical injury  Description  Absence of physical injury  Outcome: Met This Shift     Problem: Breathing Pattern - Ineffective:  Goal: Ability to achieve and maintain a regular respiratory rate will improve  Description  Ability to achieve and maintain a regular respiratory rate will improve  Outcome: Met This Shift

## 2019-04-11 NOTE — PROGRESS NOTES
Dr Ward Matters aware that patient is agreeable to do biopsy here. States it will likely be tomorrow but that he would be up to speak with the patient.

## 2019-04-11 NOTE — PROGRESS NOTES
Internal Medicine Progress Note      Candisrupesh Dalton. Anamika Figueredo., & [address] [facility] [address] Anamika Figueredo., F.A.C.O.I. Cece Munoz D.O., F.A.C.O.I. Primary Care Physician: Gabbie Gamez DO   Admitting Physician:  Silver Babcock DO  Admission date and time: 4/10/2019  7:45 AM    Room:  [address] [place] [address]  Admitting diagnosis: Community acquired bacterial pneumonia [J15.9]    Patient Name: Tee Mishra  MRN: [id number]    Date of Service: 4/11/2019     Subjective:    Renan Sunshine is a 51 y. o.  female who was seen and examined today,4/11/2019, at the bedside. Complaining of some shortness of breath but coughing is better. Agreeable for bronchoscopy. No family present during my examination. I reviewed the patient's past medical, surgical history and medication. I reviewed the  course of events since last visit. Review of System:  Review of Systems   Constitutional: Positive for appetite change. Negative for activity change, chills, fatigue and fever. HENT: Negative for congestion, ear pain, hearing loss, sinus pressure and sore throat. Eyes: Negative for pain, discharge and visual disturbance. Respiratory: Positive for cough, shortness of breath and wheezing. Cardiovascular: Negative for chest pain, palpitations and leg swelling. Gastrointestinal: Negative for abdominal pain, constipation, diarrhea, nausea and vomiting. Endocrine: Negative for cold intolerance, heat intolerance, polydipsia and polyuria. Genitourinary: Negative for dysuria, flank pain, frequency and urgency. Musculoskeletal: Negative for back pain, myalgias and neck pain. Skin: Negative for rash and wound. Neurological: Negative for dizziness, weakness, light-headedness, numbness and headaches. Hematological: Does not bruise/bleed easily. Psychiatric/Behavioral: Negative for confusion and sleep disturbance. The patient is not nervous/anxious.         Allergy:  No Known Allergies Medication:  Scheduled Meds:   methylPREDNISolone  40 mg Intravenous Q8H    [START ON 4/12/2019] sertraline  50 mg Oral Daily    sulfaSALAzine  1,000 mg Oral BID    ipratropium-albuterol  1 ampule Inhalation Q4H WA    sodium chloride flush  10 mL Intravenous 2 times per day    cefTRIAXone (ROCEPHIN) IV  1 g Intravenous Q24H    azithromycin  500 mg Oral Daily     Continuous Infusions:   sodium chloride 50 mL/hr at 04/11/19 1508       Objective Data:  CBC:   Recent Labs     04/10/19  0818 04/11/19  0600   WBC 10.6 12.9*   HGB 13.2 11.5    227     BMP:    Recent Labs     04/10/19  0818 04/11/19  0600    140   K 3.6 3.8   CL 98 105   CO2 26 23   BUN 8 10   CREATININE 0.7 0.5   GLUCOSE 95 116*     CMP:   Lab Results   Component Value Date     04/11/2019    K 3.8 04/11/2019     04/11/2019    CO2 23 04/11/2019    BUN 10 04/11/2019    CREATININE 0.5 04/11/2019    GFRAA >60 04/11/2019    LABGLOM >60 04/11/2019    GLUCOSE 116 04/11/2019    PROT 7.3 04/11/2019    LABALBU 3.6 04/11/2019    CALCIUM 9.1 04/11/2019    BILITOT 0.3 04/11/2019    ALKPHOS 93 04/11/2019    AST 10 04/11/2019    ALT 7 04/11/2019     Hepatic:   Recent Labs     04/11/19  0600   AST 10   ALT 7   BILITOT 0.3   ALKPHOS 93     Troponin:  Recent Labs     04/10/19  0818   TROPONINI <0.01     BNP: No results for input(s): BNP in the last 72 hours. Lipids: No results for input(s): CHOL, HDL in the last 72 hours. Invalid input(s): LDLCALCU  ABGs: No results found for: PHART, PO2ART, WPG1HHD  INR:   Recent Labs     04/11/19  0600   INR 1.2   PROTIME 13.7*     RAD: Ct Abdomen Pelvis W Iv Contrast Additional Contrast? None    Result Date: 4/11/2019  Reading location:  200 HISTORY: 46-year-old female with history of lung CA, status post lobectomy and chemotherapy treatment.  TECHNIQUE: Serial axial images of the abdomen and pelvis were obtained following the uneventful administration of 80 cc of Isovue-370 intravenous contrast. Reformatted sagittal and coronal images were obtained. One or more of the following dose reduction techniques were used: Automated dose exposure. Adjustment of the mA and/or kV according to patient size Use of iterative reconstruction techniques FINDINGS: The liver is homogeneous. There is no enhancing lesion seen within the liver to suggest metastatic disease. The spleen, pancreas and adrenal glands are unremarkable. The kidneys are normal in appearance. Normal cortical enhancement is noted. There is no ureteral calculus. There is no bladder mass or bladder calculus. There is a 7.5 mm calculus within the gallbladder lumen. There is a small amount of sludge within the gallbladder lumen. There is no wall thickening or pericholecystic fluid. The stomach is normally distended. There is no large or small bowel obstruction. No right lower quadrant inflammatory changes are noted. There is no pelvic mass, fluid collection or free air. The abdominal aorta is normal in caliber. There is no retroperitoneal lymphadenopathy. Bone windows of the lumbar spine are negative for any lytic or blastic lesion. 1. There is no acute intra-abdominal or intrapelvic pathology. Specifically, there is no evidence of metastatic disease to the liver or adrenal glands. 2. 8 mm stone and small amount of sludge within the gallbladder lumen. There is no evidence of acute cholecystitis. There is no osseous metastasis. Xr Chest Portable    Result Date: 4/10/2019  LOCATION: 200 EXAM: XR CHEST PORTABLE COMPARISON: Prior stating back to January 2016 HISTORY: Shortness of breath TECHNIQUE: Single frontal view of the chest was obtained. FINDINGS:  SUPPORT DEVICES: None. LUNGS: There is increase in a paramediastinal opacity in the right upper lung zone measuring 4 cm in maximal dimension. This is concerning for recurrent malignancy. Left lung is clear. PLEURA: No effusions or pneumothorax. LUNG VOLUMES: Satisfactory inspirator effort.  MEDIASTINAL STRUCTURES: No lymphadenopathy. Normal aortic contour. HEART SIZE: Normal. BONES AND SOFT TISSUES: No fracture or soft tissue abnormality. Increasing right paramediastinal opacity is concerning for recurrent malignancy. Chest CT should be obtained for further assessment. Cta Chest W Contrast    Result Date: 4/10/2019  LOCATION:200 EXAM: CTA CHEST W CONTRAST COMPARISON: December 29, 2017 chest CT HISTORY:  History of lung cancer shortness of breath, new lung lesion TECHNIQUE: Axial CT images are obtained of the chest.  Coronal and sagittal reconstructions were obtained with 3-D maximum intensity projection (MIP) reconstructed images. These were performed on a separate workstation with concurrent supervision for detailed evaluation of the pulmonary arteries. CONTRAST: 80 mL Isovue-370 intravenous contrast. FINDINGS: SUPPORT DEVICES: None LUNGS/CENTRAL AIRWAYS/PLEURA: Postsurgical changes from right upper lobe lobectomy. There is increasing confluent airspace disease and soft tissue in the right lung apex at the level of the surgical staple line with adjacent surgical staples. The consolidation extends into the anterior aspect of the right upper lobe with volume loss of the middle lobe. The middle lobe bronchus is no longer visualized. The amount of confluent airspace disease in the area of the radiation field appears slightly increased from the previous study. There is moderate to severe emphysema throughout the left lung. No lung nodules are identified. PULMONARY ARTERIES: Evaluation is adequate for pulmonary embolus. No filling defects identified to the subsegmental level. HEART/PERICARDIUM/GREAT VESSELS: Cardiac size is normal.  There is no pericardial effusion. The great vessels of the chest are normal in caliber. LYMPH NODES: No thoracic adenopathy by size criteria. NECK BASE/CHEST WALL/DIAPHRAGM: No soft tissue lesions or diaphragmatic abnormality.  UPPER ABDOMEN: Limited images through the upper abdomen are unremarkable. OSSEOUS STRUCTURES: No suspicious lytic or blastic lesions. No fractures. 1. There is new obstruction and volume loss of the middle lobe bronchus and middle lobe. This is concerning for localized recurrence at the level of the lobectomy site. Consider follow-up with direct visualization. 2. Slight increase in airspace disease involving the posterior aspect of the right lower lobe, likely infectious. 3. No new lung nodules or lymphadenopathy. Nm Bone Scan Whole Body    Result Date: 4/11/2019  LOCATION: 200 EXAM: NM BONE SCAN WHOLE BODY COMPARISON: 4/10/2019 CT HISTORY: Lung cancer TECHNIQUE: 25.8 mCi of MDP administered to the patient. FINDINGS: Uniform activity is seen in the skeleton. No focal areas of abnormal uptake identified. No scintigraphic evidence of metastatic disease. Physical Exam:  I/O this shift:  In: -   Out: 300 [Urine:300]    Intake/Output Summary (Last 24 hours) at 4/11/2019 1553  Last data filed at 4/11/2019 1526  Gross per 24 hour   Intake 1325 ml   Output 400 ml   Net 925 ml   I/O last 3 completed shifts: In: 6397 [P.O.:240; I.V.:1085]  Out: 100 [Urine:100]  Patient Vitals for the past 96 hrs (Last 3 readings):   Weight   04/10/19 0753 145 lb (65.8 kg)       Vital Signs:   Blood pressure 104/62, pulse 93, temperature 97.6 °F (36.4 °C), temperature source Axillary, resp. rate 16, height 5' 2\" (1.575 m), weight 145 lb (65.8 kg), SpO2 95 %, not currently breastfeeding. Physical Exam   Constitutional: She is oriented to person, place, and time. She appears well-developed and well-nourished. No distress. Glasses   HENT:   Head: Normocephalic and atraumatic. Right Ear: External ear normal.   Left Ear: External ear normal.   Mouth/Throat: Oropharynx is clear and moist.   Eyes: Pupils are equal, round, and reactive to light. Conjunctivae and EOM are normal. No scleral icterus. Neck: Normal range of motion. Neck supple. No JVD present.  No tracheal deviation present. No thyromegaly present. Mediport   Cardiovascular: Normal rate and regular rhythm. Exam reveals no gallop and no friction rub. No murmur heard. Pulmonary/Chest: Effort normal. No respiratory distress. She has wheezes. She has no rales. Decreased breath sounds posteriorly   Abdominal: Soft. Bowel sounds are normal. She exhibits no distension and no mass. There is no tenderness. There is no rebound and no guarding. Musculoskeletal: Normal range of motion. She exhibits no edema or tenderness. Neurological: She is alert and oriented to person, place, and time. No cranial nerve deficit. Skin: Skin is warm and dry. No rash noted. She is not diaphoretic. No erythema. Psychiatric: She has a normal mood and affect. Her behavior is normal. Thought content normal.              Chronic Hospital Medical Problems:  Past Medical History:   Diagnosis Date    [facility]    Depression 6/22/2016    Pneumonia     [facility]     Rheumatoid arthritis involving multiple sites with positive rheumatoid factor ([facility] 9/7/2018     Principal Problem:    Lung mass  Active Problems:    Community acquired bacterial pneumonia  Resolved Problems:    * No resolved hospital problems. *      Assessment:    1. Suspected recurrence of small cell lung carcinoma at the site of previous lobectomy in the setting of right upper lung lobectomy followed by adjuvant chemotherapy and radiation  2. History of tobacco abuse  3. Rheumatoid arthritis  4. Gallbladder stone        Plan:     Seen by multiple specialist. Will need bronchoscopy. Continue current respiratory treatment, steroids and IV antibiotics. More than 50% of my  time was spent counseling and/or coordination of care with thepatient and/or family with face to face contact.      Time was spent reviewing notes and laboratory data, instructing and counseling the patient  regarding my findings and recommendations and reviewing and

## 2019-04-11 NOTE — PROGRESS NOTES
Pulmonary/Critical Care Progress Note    We are following patient for right middle lobe obstruction status post right upper lobectomy with N1 disease status post chemotherapy and radiation in 2015/16. SUBJECTIVE:  The patient has been doing quite well and is especially better since yesterday. She is breathing easily on room air and is not having any shortness of breath even with exercise such as going down the gorman or to the bathroom. Her appetite is also somewhat improved. She received Lovenox 40 mg this afternoon. She wishes that her repeat bronchoscopy be done here and we will arrange this for tomorrow. The decision on this was made at about 11:00 after which she had already eaten breakfast at 8:00-9:00 a.m. this morning. There is been no hemoptysis or sputum production in the interval since admission. She does admit to smoking 1 cigarette prior to coming in the hospital yesterday morning. MEDICATIONS:   methylPREDNISolone  40 mg Intravenous Q8H    [START ON 4/12/2019] sertraline  50 mg Oral Daily    sulfaSALAzine  1,000 mg Oral BID    ipratropium-albuterol  1 ampule Inhalation Q4H WA    sodium chloride flush  10 mL Intravenous 2 times per day    cefTRIAXone (ROCEPHIN) IV  1 g Intravenous Q24H    azithromycin  500 mg Oral Daily      sodium chloride 50 mL/hr at 04/11/19 1508     sodium chloride flush, acetaminophen, LORazepam, benzocaine-menthol, benzonatate      REVIEW OF SYSTEMS:  Constitutional: Denies fever, weight loss, night sweats, and fatigue  Skin: Denies pigmentation, dark lesions, and rashes   HEENT: Denies hearing loss, tinnitus, ear drainage, epistaxis, sore throat, and hoarseness. Cardiovascular: Denies palpitations, chest pain, and chest pressure. Respiratory: Denies cough, dyspnea at rest, hemoptysis, apnea, and choking.   Gastrointestinal: Denies nausea, vomiting, poor appetite, diarrhea, heartburn or reflux  Genitourinary: Denies dysuria, frequency, urgency or hematuria  Musculoskeletal: Denies myalgias, muscle weakness, and bone pain  Neurological: Denies dizziness, vertigo, headache, and focal weakness  Psychological: Denies anxiety and depression  Endocrine: Denies heat intolerance and cold intolerance  Hematopoietic/Lymphatic: Denies bleeding problems and blood transfusions    OBJECTIVE:  Vitals:    04/11/19 0928   BP: 104/62   Pulse: 93   Resp: 16   Temp: 97.6 °F (36.4 °C)   SpO2: 95%           O2 Device: None (Room air)    PHYSICAL EXAM:  Constitutional: No fever chills diaphoresis  Skin: No skin rashes, no skin breakdown  HEENT: Unremarkable  Neck: No jugular venous distention, lymphadenopathy, thyromegaly  Cardiovascular: S1, S2 normal. No S3-S4 murmurs or rubs  Respiratory: Few crackles in right upper lobe. No wheezes on right side or left side. No tracheal deviation  Gastrointestinal: Soft, flat, nontender  Genitourinary: No CVA tenderness  Extremities: No clubbing, no cyanosis, no edema  Neurological: Awake alert and oriented ×3.  No evidence of focal motor or sensory deficits  Psychological: Upbeat attitude and normal affect    LABS:  WBC   Date Value Ref Range Status   04/11/2019 12.9 (H) 4.5 - 11.5 E9/L Final   04/10/2019 10.6 4.5 - 11.5 E9/L Final   10/13/2016 7.4 4.5 - 11.5 E9/L Final     Hemoglobin   Date Value Ref Range Status   04/11/2019 11.5 11.5 - 15.5 g/dL Final   04/10/2019 13.2 11.5 - 15.5 g/dL Final   10/13/2016 12.5 11.5 - 15.5 g/dL Final     Hematocrit   Date Value Ref Range Status   04/11/2019 35.6 34.0 - 48.0 % Final   04/10/2019 40.5 34.0 - 48.0 % Final   10/13/2016 36.9 34.0 - 48.0 % Final     MCV   Date Value Ref Range Status   04/11/2019 87.3 80.0 - 99.9 fL Final   04/10/2019 85.6 80.0 - 99.9 fL Final   10/13/2016 82.9 80.0 - 99.9 fL Final     Platelets   Date Value Ref Range Status   04/11/2019 227 130 - 450 E9/L Final   04/10/2019 224 130 - 450 E9/L Final   10/13/2016 260 130 - 450 E9/L Final     Sodium   Date Value Ref Range Status 04/11/2019 140 132 - 146 mmol/L Final   04/10/2019 136 132 - 146 mmol/L Final   10/13/2016 139 132 - 146 mmol/L Final     Potassium   Date Value Ref Range Status   04/11/2019 3.8 3.5 - 5.0 mmol/L Final   04/10/2019 3.6 3.5 - 5.0 mmol/L Final   10/13/2016 3.8 3.5 - 5.0 mmol/L Final     Chloride   Date Value Ref Range Status   04/11/2019 105 98 - 107 mmol/L Final   04/10/2019 98 98 - 107 mmol/L Final   10/13/2016 100 98 - 107 mmol/L Final     CO2   Date Value Ref Range Status   04/11/2019 23 22 - 29 mmol/L Final   04/10/2019 26 22 - 29 mmol/L Final   10/13/2016 27 22 - 29 mmol/L Final     BUN   Date Value Ref Range Status   04/11/2019 10 6 - 20 mg/dL Final   04/10/2019 8 6 - 20 mg/dL Final   10/13/2016 14 6 - 20 mg/dL Final     CREATININE   Date Value Ref Range Status   04/11/2019 0.5 0.5 - 1.0 mg/dL Final   04/10/2019 0.7 0.5 - 1.0 mg/dL Final   10/13/2016 0.7 0.5 - 1.0 mg/dL Final     Glucose   Date Value Ref Range Status   04/11/2019 116 (H) 74 - 99 mg/dL Final   04/10/2019 95 74 - 99 mg/dL Final   10/13/2016 88 74 - 109 mg/dL Final     Calcium   Date Value Ref Range Status   04/11/2019 9.1 8.6 - 10.2 mg/dL Final   04/10/2019 9.9 8.6 - 10.2 mg/dL Final   10/13/2016 9.7 8.6 - 10.2 mg/dL Final     Total Protein   Date Value Ref Range Status   04/11/2019 7.3 6.4 - 8.3 g/dL Final   06/01/2016 7.9 6.4 - 8.3 g/dL Final   04/28/2016 6.9 6.4 - 8.3 g/dL Final     Alb   Date Value Ref Range Status   04/11/2019 3.6 3.5 - 5.2 g/dL Final   06/01/2016 3.6 3.5 - 5.2 g/dL Final   04/28/2016 3.5 3.5 - 5.2 g/dL Final     Total Bilirubin   Date Value Ref Range Status   04/11/2019 0.3 0.0 - 1.2 mg/dL Final   06/01/2016 0.3 0.0 - 1.2 mg/dL Final   04/28/2016 0.4 0.0 - 1.2 mg/dL Final     Alkaline Phosphatase   Date Value Ref Range Status   04/11/2019 93 35 - 104 U/L Final   06/01/2016 84 35 - 104 U/L Final   04/28/2016 84 35 - 104 U/L Final     AST   Date Value Ref Range Status   04/11/2019 10 0 - 31 U/L Final   06/01/2016 11 0 - 31 recurrence   at the level of the lobectomy site. Consider follow-up with direct   visualization. 2. Slight increase in airspace disease involving the posterior aspect   of the right lower lobe, likely infectious. 3. No new lung nodules or lymphadenopathy. XR CHEST PORTABLE   Final Result   Increasing right paramediastinal opacity is concerning for recurrent   malignancy. Chest CT should be obtained for further assessment. PROBLEM LIST:  Principal Problem:    Lung mass  Active Problems:    Community acquired bacterial pneumonia  Resolved Problems:    * No resolved hospital problems. *      IMPRESSION:  1. Right middle lobe loss of volume-rule out recurrent squamous cell carcinoma in a patient with previous N1 disease and status post right upper lobectomy  2. COPD, stable and asymptomatic at this point  3. Possible pneumonia    PLAN:  1. Reduce IV steroids  2. Continue IV antibiotics and aerosol treatments  3. DC Lovenox for now  4. Plan for bronchoscopy tomorrow-currently scheduled for 11:30 AM  5. Check pro time, PTT, platelets in the morning  6. Nothing by mouth after clear liquids at 5 AM        Adi Cross M.D.   Electronically signed by Aneudy Angulo MD on 4/11/2019 at 4:07 PM

## 2019-04-12 ENCOUNTER — ANESTHESIA EVENT (OUTPATIENT)
Dept: ENDOSCOPY | Age: 49
DRG: 194 | End: 2019-04-12
Payer: COMMERCIAL

## 2019-04-12 ENCOUNTER — ANESTHESIA (OUTPATIENT)
Dept: ENDOSCOPY | Age: 49
DRG: 194 | End: 2019-04-12
Payer: COMMERCIAL

## 2019-04-12 ENCOUNTER — APPOINTMENT (OUTPATIENT)
Dept: GENERAL RADIOLOGY | Age: 49
DRG: 194 | End: 2019-04-12
Payer: COMMERCIAL

## 2019-04-12 VITALS — OXYGEN SATURATION: 95 % | DIASTOLIC BLOOD PRESSURE: 65 MMHG | SYSTOLIC BLOOD PRESSURE: 98 MMHG

## 2019-04-12 LAB
ALBUMIN SERPL-MCNC: 3.5 G/DL (ref 3.5–5.2)
ALP BLD-CCNC: 71 U/L (ref 35–104)
ALT SERPL-CCNC: 6 U/L (ref 0–32)
ANION GAP SERPL CALCULATED.3IONS-SCNC: 9 MMOL/L (ref 7–16)
APTT: 29.4 SEC (ref 24.5–35.1)
AST SERPL-CCNC: 8 U/L (ref 0–31)
BASOPHILS ABSOLUTE: 0 E9/L (ref 0–0.2)
BASOPHILS RELATIVE PERCENT: 0.1 % (ref 0–2)
BILIRUB SERPL-MCNC: 0.2 MG/DL (ref 0–1.2)
BUN BLDV-MCNC: 11 MG/DL (ref 6–20)
BURR CELLS: ABNORMAL
CALCIUM SERPL-MCNC: 8.7 MG/DL (ref 8.6–10.2)
CHLORIDE BLD-SCNC: 110 MMOL/L (ref 98–107)
CO2: 23 MMOL/L (ref 22–29)
CREAT SERPL-MCNC: 0.5 MG/DL (ref 0.5–1)
EOSINOPHILS ABSOLUTE: 0 E9/L (ref 0.05–0.5)
EOSINOPHILS RELATIVE PERCENT: 0 % (ref 0–6)
GFR AFRICAN AMERICAN: >60
GFR NON-AFRICAN AMERICAN: >60 ML/MIN/1.73
GLUCOSE BLD-MCNC: 124 MG/DL (ref 74–99)
HCT VFR BLD CALC: 32.5 % (ref 34–48)
HEMOGLOBIN: 10.5 G/DL (ref 11.5–15.5)
INR BLD: 1.1
LYMPHOCYTES ABSOLUTE: 0 E9/L (ref 1.5–4)
LYMPHOCYTES RELATIVE PERCENT: 3.3 % (ref 20–42)
MCH RBC QN AUTO: 28.2 PG (ref 26–35)
MCHC RBC AUTO-ENTMCNC: 32.3 % (ref 32–34.5)
MCV RBC AUTO: 87.4 FL (ref 80–99.9)
MONOCYTES ABSOLUTE: 0.65 E9/L (ref 0.1–0.95)
MONOCYTES RELATIVE PERCENT: 3.5 % (ref 2–12)
MRSA CULTURE ONLY: NORMAL
NEUTROPHILS ABSOLUTE: 15.71 E9/L (ref 1.8–7.3)
NEUTROPHILS RELATIVE PERCENT: 96.5 % (ref 43–80)
OVALOCYTES: ABNORMAL
PDW BLD-RTO: 15.1 FL (ref 11.5–15)
PLATELET # BLD: 250 E9/L (ref 130–450)
PMV BLD AUTO: 10.6 FL (ref 7–12)
POLYCHROMASIA: ABNORMAL
POTASSIUM SERPL-SCNC: 3.8 MMOL/L (ref 3.5–5)
PROTHROMBIN TIME: 12.1 SEC (ref 9.3–12.4)
RBC # BLD: 3.72 E12/L (ref 3.5–5.5)
SODIUM BLD-SCNC: 142 MMOL/L (ref 132–146)
TOTAL PROTEIN: 7.2 G/DL (ref 6.4–8.3)
WBC # BLD: 16.2 E9/L (ref 4.5–11.5)

## 2019-04-12 PROCEDURE — 2580000003 HC RX 258: Performed by: NURSE ANESTHETIST, CERTIFIED REGISTERED

## 2019-04-12 PROCEDURE — 85730 THROMBOPLASTIN TIME PARTIAL: CPT

## 2019-04-12 PROCEDURE — 2580000003 HC RX 258: Performed by: INTERNAL MEDICINE

## 2019-04-12 PROCEDURE — 7100000000 HC PACU RECOVERY - FIRST 15 MIN: Performed by: INTERNAL MEDICINE

## 2019-04-12 PROCEDURE — 7100000001 HC PACU RECOVERY - ADDTL 15 MIN: Performed by: INTERNAL MEDICINE

## 2019-04-12 PROCEDURE — 6360000002 HC RX W HCPCS: Performed by: NURSE ANESTHETIST, CERTIFIED REGISTERED

## 2019-04-12 PROCEDURE — 3700000000 HC ANESTHESIA ATTENDED CARE: Performed by: INTERNAL MEDICINE

## 2019-04-12 PROCEDURE — 80053 COMPREHEN METABOLIC PANEL: CPT

## 2019-04-12 PROCEDURE — 2709999900 HC NON-CHARGEABLE SUPPLY: Performed by: INTERNAL MEDICINE

## 2019-04-12 PROCEDURE — 0BJ08ZZ INSPECTION OF TRACHEOBRONCHIAL TREE, VIA NATURAL OR ARTIFICIAL OPENING ENDOSCOPIC: ICD-10-PCS | Performed by: INTERNAL MEDICINE

## 2019-04-12 PROCEDURE — 6370000000 HC RX 637 (ALT 250 FOR IP): Performed by: INTERNAL MEDICINE

## 2019-04-12 PROCEDURE — 3609027000 HC BRONCHOSCOPY: Performed by: INTERNAL MEDICINE

## 2019-04-12 PROCEDURE — 2700000000 HC OXYGEN THERAPY PER DAY

## 2019-04-12 PROCEDURE — 85610 PROTHROMBIN TIME: CPT

## 2019-04-12 PROCEDURE — 94640 AIRWAY INHALATION TREATMENT: CPT

## 2019-04-12 PROCEDURE — 7100000011 HC PHASE II RECOVERY - ADDTL 15 MIN: Performed by: INTERNAL MEDICINE

## 2019-04-12 PROCEDURE — 85025 COMPLETE CBC W/AUTO DIFF WBC: CPT

## 2019-04-12 PROCEDURE — 6360000002 HC RX W HCPCS: Performed by: INTERNAL MEDICINE

## 2019-04-12 PROCEDURE — 2060000000 HC ICU INTERMEDIATE R&B

## 2019-04-12 PROCEDURE — 7100000010 HC PHASE II RECOVERY - FIRST 15 MIN: Performed by: INTERNAL MEDICINE

## 2019-04-12 PROCEDURE — 36415 COLL VENOUS BLD VENIPUNCTURE: CPT

## 2019-04-12 PROCEDURE — 3700000001 HC ADD 15 MINUTES (ANESTHESIA): Performed by: INTERNAL MEDICINE

## 2019-04-12 RX ORDER — SODIUM CHLORIDE 9 MG/ML
INJECTION, SOLUTION INTRAVENOUS CONTINUOUS PRN
Status: DISCONTINUED | OUTPATIENT
Start: 2019-04-12 | End: 2019-04-12 | Stop reason: SDUPTHER

## 2019-04-12 RX ORDER — PROPOFOL 10 MG/ML
INJECTION, EMULSION INTRAVENOUS PRN
Status: DISCONTINUED | OUTPATIENT
Start: 2019-04-12 | End: 2019-04-12 | Stop reason: SDUPTHER

## 2019-04-12 RX ORDER — MIDAZOLAM HYDROCHLORIDE 1 MG/ML
INJECTION INTRAMUSCULAR; INTRAVENOUS PRN
Status: DISCONTINUED | OUTPATIENT
Start: 2019-04-12 | End: 2019-04-12 | Stop reason: SDUPTHER

## 2019-04-12 RX ADMIN — IPRATROPIUM BROMIDE AND ALBUTEROL SULFATE 1 AMPULE: .5; 3 SOLUTION RESPIRATORY (INHALATION) at 09:58

## 2019-04-12 RX ADMIN — PROPOFOL 250 MG: 10 INJECTION, EMULSION INTRAVENOUS at 12:08

## 2019-04-12 RX ADMIN — LORAZEPAM 1 MG: 2 INJECTION INTRAMUSCULAR; INTRAVENOUS at 09:52

## 2019-04-12 RX ADMIN — IPRATROPIUM BROMIDE AND ALBUTEROL SULFATE 1 AMPULE: .5; 3 SOLUTION RESPIRATORY (INHALATION) at 18:56

## 2019-04-12 RX ADMIN — IPRATROPIUM BROMIDE AND ALBUTEROL SULFATE 1 AMPULE: .5; 3 SOLUTION RESPIRATORY (INHALATION) at 06:20

## 2019-04-12 RX ADMIN — WATER 1 G: 1 INJECTION INTRAMUSCULAR; INTRAVENOUS; SUBCUTANEOUS at 13:59

## 2019-04-12 RX ADMIN — IPRATROPIUM BROMIDE AND ALBUTEROL SULFATE 1 AMPULE: .5; 3 SOLUTION RESPIRATORY (INHALATION) at 12:30

## 2019-04-12 RX ADMIN — SERTRALINE HYDROCHLORIDE 50 MG: 50 TABLET ORAL at 13:56

## 2019-04-12 RX ADMIN — LORAZEPAM 1 MG: 2 INJECTION INTRAMUSCULAR; INTRAVENOUS at 22:54

## 2019-04-12 RX ADMIN — Medication 10 ML: at 21:38

## 2019-04-12 RX ADMIN — METHYLPREDNISOLONE SODIUM SUCCINATE 40 MG: 40 INJECTION, POWDER, FOR SOLUTION INTRAMUSCULAR; INTRAVENOUS at 21:38

## 2019-04-12 RX ADMIN — METHYLPREDNISOLONE SODIUM SUCCINATE 40 MG: 40 INJECTION, POWDER, FOR SOLUTION INTRAMUSCULAR; INTRAVENOUS at 05:36

## 2019-04-12 RX ADMIN — MIDAZOLAM 2 MG: 1 INJECTION INTRAMUSCULAR; INTRAVENOUS at 11:41

## 2019-04-12 RX ADMIN — BENZOCAINE AND MENTHOL 1 LOZENGE: 15; 3.6 LOZENGE ORAL at 13:57

## 2019-04-12 RX ADMIN — AZITHROMYCIN MONOHYDRATE 500 MG: 250 TABLET ORAL at 13:57

## 2019-04-12 RX ADMIN — METHYLPREDNISOLONE SODIUM SUCCINATE 40 MG: 40 INJECTION, POWDER, FOR SOLUTION INTRAMUSCULAR; INTRAVENOUS at 13:59

## 2019-04-12 RX ADMIN — SODIUM CHLORIDE: 9 INJECTION, SOLUTION INTRAVENOUS at 11:41

## 2019-04-12 RX ADMIN — SULFASALAZINE 1000 MG: 500 TABLET ORAL at 13:57

## 2019-04-12 RX ADMIN — SULFASALAZINE 1000 MG: 500 TABLET ORAL at 21:38

## 2019-04-12 ASSESSMENT — ENCOUNTER SYMPTOMS
SHORTNESS OF BREATH: 1
ABDOMINAL PAIN: 0
CONSTIPATION: 0
WHEEZING: 1
DIARRHEA: 0
SINUS PRESSURE: 0
EYE DISCHARGE: 0
EYE PAIN: 0
SORE THROAT: 0
VOMITING: 0
BACK PAIN: 0
NAUSEA: 0
COUGH: 1

## 2019-04-12 ASSESSMENT — PAIN SCALES - GENERAL
PAINLEVEL_OUTOF10: 0

## 2019-04-12 NOTE — PLAN OF CARE
Problem: Falls - Risk of:  Goal: Will remain free from falls  Description  Will remain free from falls  4/11/2019 2336 by Kelvin Salter RN  Outcome: Met This Shift  4/11/2019 0959 by Victoria Primrose, RN  Outcome: Met This Shift  Goal: Absence of physical injury  Description  Absence of physical injury  4/11/2019 2336 by Kelvin Salter RN  Outcome: Met This Shift  4/11/2019 0959 by Victoria Primrose, RN  Outcome: Met This Shift     Problem: Breathing Pattern - Ineffective:  Goal: Ability to achieve and maintain a regular respiratory rate will improve  Description  Ability to achieve and maintain a regular respiratory rate will improve  4/11/2019 2336 by Kelvin Salter RN  Outcome: Met This Shift  4/11/2019 0959 by Victoria Primrose, RN  Outcome: Met This Shift

## 2019-04-12 NOTE — PROGRESS NOTES
Internal Medicine Progress Note      Roni Mejía. Adriana العلي., & [address] Adriana العلي., F.A.C.O.I. Jessica Perales D.O., F.A.C.O.I. Primary Care Physician: Jesusita Bridges DO   Admitting Physician:  Ming Rodriguez DO  Admission date and time: 4/10/2019  7:45 AM    Room:  [facility] POOL ROOM/NONE  Admitting diagnosis: Community acquired bacterial pneumonia [J15.9]    Patient Name: Ernst Contreras  MRN: [id number]    Date of Service: 4/12/2019     Subjective:    Denis Munoz is a 51 y. o.  female who was seen and examined today,4/12/2019, at the bedside. Still with complaint of shortness of breath. As for bronchoscopy today. Patient is somewhat tearful during examination. Patient states it's just the fear of the unknown Just wishes that she had had the testing already and new what she was facing. No family present during my examination. I reviewed the patient's past medical, surgical history and medication. I reviewed the  course of events since last visit. Review of System:  Review of Systems   Constitutional: Positive for appetite change. Negative for activity change, chills, fatigue and fever. HENT: Negative for congestion, ear pain, hearing loss, sinus pressure and sore throat. Eyes: Negative for pain, discharge and visual disturbance. Respiratory: Positive for cough, shortness of breath and wheezing. Cardiovascular: Negative for chest pain, palpitations and leg swelling. Gastrointestinal: Negative for abdominal pain, constipation, diarrhea, nausea and vomiting. Endocrine: Negative for cold intolerance, heat intolerance, polydipsia and polyuria. Genitourinary: Negative for dysuria, flank pain, frequency and urgency. Musculoskeletal: Negative for back pain, myalgias and neck pain. Skin: Negative for rash and wound. Neurological: Negative for dizziness, weakness, light-headedness, numbness and headaches.    Hematological: Does not bruise/bleed easily. Psychiatric/Behavioral: Negative for confusion and sleep disturbance. The patient is not nervous/anxious. Allergy:  No Known Allergies     Medication:  Scheduled Meds:   methylPREDNISolone  40 mg Intravenous Q8H    sertraline  50 mg Oral Daily    sulfaSALAzine  1,000 mg Oral BID    ipratropium-albuterol  1 ampule Inhalation Q4H WA    sodium chloride flush  10 mL Intravenous 2 times per day    cefTRIAXone (ROCEPHIN) IV  1 g Intravenous Q24H    azithromycin  500 mg Oral Daily     Continuous Infusions:   sodium chloride 50 mL/hr at 04/11/19 2247       Objective Data:  CBC:   Recent Labs     04/10/19  0818 04/11/19  0600 04/12/19  0545   WBC 10.6 12.9* 16.2*   HGB 13.2 11.5 10.5*    227 250     BMP:    Recent Labs     04/10/19  0818 04/11/19  0600 04/12/19  0545    140 142   K 3.6 3.8 3.8   CL 98 105 110*   CO2 26 23 23   BUN 8 10 11   CREATININE 0.7 0.5 0.5   GLUCOSE 95 116* 124*     CMP:   Lab Results   Component Value Date     04/12/2019    K 3.8 04/12/2019     04/12/2019    CO2 23 04/12/2019    BUN 11 04/12/2019    CREATININE 0.5 04/12/2019    GFRAA >60 04/12/2019    LABGLOM >60 04/12/2019    GLUCOSE 124 04/12/2019    PROT 7.2 04/12/2019    LABALBU 3.5 04/12/2019    CALCIUM 8.7 04/12/2019    BILITOT 0.2 04/12/2019    ALKPHOS 71 04/12/2019    AST 8 04/12/2019    ALT 6 04/12/2019     Hepatic:   Recent Labs     04/11/19  0600 04/12/19  0545   AST 10 8   ALT 7 6   BILITOT 0.3 0.2   ALKPHOS 93 71     Troponin:  Recent Labs     04/10/19  0818   TROPONINI <0.01     BNP: No results for input(s): BNP in the last 72 hours. Lipids: No results for input(s): CHOL, HDL in the last 72 hours.     Invalid input(s): LDLCALCU  ABGs: No results found for: PHART, PO2ART, MFZ1GHW  INR:   Recent Labs     04/11/19  0600 04/12/19  0545   INR 1.2 1.1   PROTIME 13.7* 12.1     RAD: Ct Abdomen Pelvis W Iv Contrast Additional Contrast? None    Result Date: 4/11/2019  Reading location:  200 HISTORY: 52-year-old female with history of lung CA, status post lobectomy and chemotherapy treatment. TECHNIQUE: Serial axial images of the abdomen and pelvis were obtained following the uneventful administration of 80 cc of Isovue-370 intravenous contrast. Reformatted sagittal and coronal images were obtained. One or more of the following dose reduction techniques were used: Automated dose exposure. Adjustment of the mA and/or kV according to patient size Use of iterative reconstruction techniques FINDINGS: The liver is homogeneous. There is no enhancing lesion seen within the liver to suggest metastatic disease. The spleen, pancreas and adrenal glands are unremarkable. The kidneys are normal in appearance. Normal cortical enhancement is noted. There is no ureteral calculus. There is no bladder mass or bladder calculus. There is a 7.5 mm calculus within the gallbladder lumen. There is a small amount of sludge within the gallbladder lumen. There is no wall thickening or pericholecystic fluid. The stomach is normally distended. There is no large or small bowel obstruction. No right lower quadrant inflammatory changes are noted. There is no pelvic mass, fluid collection or free air. The abdominal aorta is normal in caliber. There is no retroperitoneal lymphadenopathy. Bone windows of the lumbar spine are negative for any lytic or blastic lesion. 1. There is no acute intra-abdominal or intrapelvic pathology. Specifically, there is no evidence of metastatic disease to the liver or adrenal glands. 2. 8 mm stone and small amount of sludge within the gallbladder lumen. There is no evidence of acute cholecystitis. There is no osseous metastasis. Xr Chest Portable    Result Date: 4/10/2019  LOCATION: 200 EXAM: XR CHEST PORTABLE COMPARISON: Prior stating back to January 2016 HISTORY: Shortness of breath TECHNIQUE: Single frontal view of the chest was obtained. FINDINGS:  SUPPORT DEVICES: None.  LUNGS: There is increase in a paramediastinal opacity in the right upper lung zone measuring 4 cm in maximal dimension. This is concerning for recurrent malignancy. Left lung is clear. PLEURA: No effusions or pneumothorax. LUNG VOLUMES: Satisfactory inspirator effort. MEDIASTINAL STRUCTURES: No lymphadenopathy. Normal aortic contour. HEART SIZE: Normal. BONES AND SOFT TISSUES: No fracture or soft tissue abnormality. Increasing right paramediastinal opacity is concerning for recurrent malignancy. Chest CT should be obtained for further assessment. Cta Chest W Contrast    Result Date: 4/10/2019  LOCATION:200 EXAM: CTA CHEST W CONTRAST COMPARISON: December 29, 2017 chest CT HISTORY:  History of lung cancer shortness of breath, new lung lesion TECHNIQUE: Axial CT images are obtained of the chest.  Coronal and sagittal reconstructions were obtained with 3-D maximum intensity projection (MIP) reconstructed images. These were performed on a separate workstation with concurrent supervision for detailed evaluation of the pulmonary arteries. CONTRAST: 80 mL Isovue-370 intravenous contrast. FINDINGS: SUPPORT DEVICES: None LUNGS/CENTRAL AIRWAYS/PLEURA: Postsurgical changes from right upper lobe lobectomy. There is increasing confluent airspace disease and soft tissue in the right lung apex at the level of the surgical staple line with adjacent surgical staples. The consolidation extends into the anterior aspect of the right upper lobe with volume loss of the middle lobe. The middle lobe bronchus is no longer visualized. The amount of confluent airspace disease in the area of the radiation field appears slightly increased from the previous study. There is moderate to severe emphysema throughout the left lung. No lung nodules are identified. PULMONARY ARTERIES: Evaluation is adequate for pulmonary embolus. No filling defects identified to the subsegmental level.  HEART/PERICARDIUM/GREAT VESSELS: Cardiac size is normal.  There is ear normal.   Mouth/Throat: Oropharynx is clear and moist.   Eyes: Pupils are equal, round, and reactive to light. Conjunctivae and EOM are normal. No scleral icterus. Neck: Normal range of motion. Neck supple. No JVD present. No tracheal deviation present. No thyromegaly present. Mediport left chest   Cardiovascular: Normal rate and regular rhythm. Exam reveals no gallop and no friction rub. No murmur heard. Pulmonary/Chest: Effort normal. No respiratory distress. She has wheezes. She has no rales. Decreased breath sounds posteriorly   Abdominal: Soft. Bowel sounds are normal. She exhibits no distension and no mass. There is no tenderness. There is no rebound and no guarding. Musculoskeletal: Normal range of motion. She exhibits no edema or tenderness. Neurological: She is alert and oriented to person, place, and time. No cranial nerve deficit. Skin: Skin is warm and dry. No rash noted. She is not diaphoretic. No erythema. Positive for tattoos. Psychiatric: She has a normal mood and affect. Her behavior is normal. Thought content normal.              Chronic Hospital Medical Problems:  Past Medical History:   Diagnosis Date    Cancer ([facility] [address] lung    Depression 6/22/2016    Pneumonia     Rheumatoid arthritis ([facility] [address]     Rheumatoid arthritis involving multiple sites with positive rheumatoid factor ([facility] 9/7/2018     Principal Problem:    Lung mass  Active Problems:    Community acquired bacterial pneumonia  Resolved Problems:    * No resolved hospital problems. *      Assessment:    1. Suspected recurrence of small cell lung carcinoma at the site of previous lobectomy in the setting of right upper lung lobectomy followed by adjuvant chemotherapy and radiation  2. History of tobacco abuse  3. Rheumatoid arthritis  4. Gallbladder stone        Plan:   Patient is for bronchoscopy today. Seen by multiple specialist.  Continue current respiratory treatment, steroids and IV antibiotics.  Patient is tearful today. Emotional support given. Essential services for discharge planning. Activity as tolerated. Patient's medications were reviewed/continued/adjusted. Labs as ordered. Please see orders for further plan of care. More than 50% of my  time was spent counseling and/or coordination of care with thepatient and/or family with face to face contact. Time was spent reviewing notes and laboratory data, instructing and counseling the patient  regarding my findings and recommendations and reviewing and answerquestions. Alex Encarnacion DO, F.A.CVanO.I.   On 4/12/2019  11:52 AM

## 2019-04-12 NOTE — PROGRESS NOTES
[address] [facility] well  [address] dr Sherley Cooper here to talk with patient has good understanding

## 2019-04-12 NOTE — PROGRESS NOTES
[facility] Quality Flow/Interdisciplinary Rounds Progress Note        Quality Flow Rounds held on April 12, 2019    Disciplines Attending:  Bedside Nurse, ,  and Nursing Unit [address] was admitted on 4/10/2019  7:45 AM    Anticipated Discharge Date:  Expected Discharge Date: 04/13/19    Disposition:    John Score:  John Scale Score: 21    Readmission Risk              Risk of Unplanned Readmission:        14           Discussed patient goal for the day, patient clinical progression, and barriers to discharge. The following Goal(s) of the Day/Commitment(s) have been identified:  Bronch today.        Margo Hale  April 12, 2019

## 2019-04-12 NOTE — PROCEDURES
Bronchoscopy:    Indication: RML collapse, concern for endobronchial lesion  Consent: Yes  Time Out performed  Anesthesia present    Procedure: Patient sedated with propofol, local with lidocaine, bronchoscope introduced into nasal passage and then into trachea. Rosa visualized, both lungs inspected. No BAL was performed. No biopsies were taken. Impression: Diffuse erythematous mucosa, tilted/distorted anatomy secondary to pneumoniectomy and radiation. RML completed obstructted by thick mucus which was suctioned and cleared, airway patent though sig narrowed likely due to extrinsic compression. No endobronchial lesions. No complications. Pt stable, sent to PACU.

## 2019-04-12 NOTE — ANESTHESIA POSTPROCEDURE EVALUATION
Department of Anesthesiology  Postprocedure Note    Patient: Aleena Melendez  MRN: [id number]  YOB: 1970  Date of evaluation: 4/12/2019  Time:  12:36 PM     Procedure Summary     Date:  04/12/19 Room / Location:  [place] [address] / [facility] ENDOSCOPY    Anesthesia Start:  7934 Anesthesia Stop:  7897    Procedure:  BRONCHOSCOPY WITH BIOPSY AND C-ARM (N/A ) Diagnosis:  (POST OBSTRUCTIVE PNEUMONIA)    Surgeon:  Sarthak Evans MD Responsible Provider:  Gerardo Paiz MD    Anesthesia Type:  MAC ASA Status:  3          Anesthesia Type: MAC    Stephany Phase I: Stephany Score: 8    Stephany Phase II:      Last vitals: Reviewed and per EMR flowsheets.        Anesthesia Post Evaluation    Patient location during evaluation: PACU  Patient participation: complete - patient participated  Level of consciousness: awake  Pain score: 0  Airway patency: patent  Nausea & Vomiting: no nausea  Complications: no  Cardiovascular status: blood pressure returned to baseline  Respiratory status: acceptable  Hydration status: euvolemic

## 2019-04-12 NOTE — ANESTHESIA PRE PROCEDURE
sertraline (ZOLOFT) tablet 50 mg  50 mg Oral Daily Alex Encarnacion DO        sulfaSALAzine (AZULFIDINE) tablet 1,000 mg  1,000 mg Oral BID Devi Lopez Bisel, DO   1,000 mg at 04/11/19 2014    ipratropium-albuterol (DUONEB) nebulizer solution 1 ampule  1 ampule Inhalation Q4H WA Creasie Cory, DO   1 ampule at 04/12/19 8618    sodium chloride flush 0.9 % injection 10 mL  10 mL Intravenous 2 times per day Creasie Cory, DO   10 mL at 04/11/19 2015    sodium chloride flush 0.9 % injection 10 mL  10 mL Intravenous PRN Creasie Cory, DO        acetaminophen (TYLENOL) tablet 650 mg  650 mg Oral Q4H PRN Creasie Cory, DO   650 mg at 04/10/19 1355    LORazepam (ATIVAN) injection 1 mg  1 mg Intravenous Q6H PRN Jessica Murphyw, DO   1 mg at 04/12/19 7704    cefTRIAXone (ROCEPHIN) 1 g in sterile water 10 mL IV syringe  1 g Intravenous Q24H Jessica Rosas, DO   1 g at 04/11/19 1104    azithromycin (ZITHROMAX) tablet 500 mg  500 mg Oral Daily Jessica Rosas DO   500 mg at 04/11/19 1104    0.9 % sodium chloride infusion   Intravenous Continuous Luly Shaw MD 50 mL/hr at 04/11/19 2247      benzocaine-menthol (CEPACOL SORE THROAT) lozenge 1 lozenge  1 lozenge Oral Q2H PRN Creasie Cory, DO        benzonatate (TESSALON) capsule 100 mg  100 mg Oral TID PRN Creasie Cory, DO   100 mg at 04/11/19 3854       Allergies:  No Known Allergies    Problem List:    Patient Active Problem List   Diagnosis Code    Heel spur M77.30    Achilles tendinitis of left lower extremity M76.62    Neutropenic fever (HCC) D70.9, R50.81    Abscess of left upper extremity L02.414    Bronchogenic carcinoma of right lung (HCC) C34.91    Poor venous access I87.8    Acute nasopharyngitis J00    Tenosynovitis, de Quervain M65.4    Localized edema R60.0    Arthralgia M25.50    Arthralgia M25.50    Depression F32.9    Rheumatoid arthritis involving multiple sites with positive rheumatoid factor ([facility] M05.79    Community acquired bacterial pneumonia J15.9    Lung mass R91.8       Past Medical History:        Diagnosis Date    Cancer ([facility] lung    Depression 6/22/2016    Pneumonia     Rheumatoid arthritis ([facility]     Rheumatoid arthritis involving multiple sites with positive rheumatoid factor ([facility] 9/7/2018       Past Surgical History:        Procedure Laterality Date    BLADDER SURGERY      HYSTERECTOMY      LUNG CANCER SURGERY Right 2015    OTHER SURGICAL HISTORY Right 04/03/15    retro calconeal heel spur excision with speed bridge right foot    TUBAL LIGATION         Social History:    Social History     Tobacco Use    Smoking status: Former Smoker     Packs/day: 1.00    Smokeless tobacco: Never Used   Substance Use Topics    Alcohol use: Yes     Comment: rarely                                Counseling given: Not Answered      Vital Signs (Current):   Vitals:    04/11/19 0928 04/11/19 2000 04/11/19 2315 04/12/19 0948   BP: 104/62 103/64 108/68 104/64   Pulse: 93 109 89 86   Resp: 16 20 18 16   Temp: 97.6 °F (36.4 °C) 97.3 °F (36.3 °C) 97.5 °F (36.4 °C) 98 °F (36.7 °C)   TempSrc: Axillary Oral Oral Oral   SpO2: 95% 93% 94% 96%   Weight:       Height:                                                  BP Readings from Last 3 Encounters:   04/12/19 104/64   03/29/19 116/60   12/27/18 112/68       NPO Status:                                                                                 BMI:   Wt Readings from Last 3 Encounters:   04/10/19 145 lb (65.8 kg)   03/29/19 140 lb (63.5 kg)   12/27/18 153 lb (69.4 kg)     Body mass index is 26.52 kg/m².     CBC:   Lab Results   Component Value Date    WBC 16.2 04/12/2019    RBC 3.72 04/12/2019    HGB 10.5 04/12/2019    HCT 32.5 04/12/2019    MCV 87.4 04/12/2019    RDW 15.1 04/12/2019     04/12/2019       CMP:   Lab Results   Component Value Date     04/12/2019    K 3.8 04/12/2019     04/12/2019    CO2 23 04/12/2019    BUN 11 04/12/2019    CREATININE 0.5 04/12/2019    GFRAA >60

## 2019-04-12 NOTE — PROGRESS NOTES
Consults   Hematology/Oncology  Consult      Patient Name: Wilfred Richardson  YOB: 1970  PCP: Rodney Baldwin DO   Referring Provider:      Reason for Consultation:   Chief Complaint   Patient presents with    Shortness of Breath     pt to er via ambulatory was at work c/o of sob jasiel 2 days states hx of lung ca 2015         Subjective:  No acute events overnight. S/p bronch w/ biopsy this afternoon. Has cough, no other complaints. History of Present Illness:  67-year-old woman with history of lung cancer. 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 She was treated at [facility] by Dr. Debbie Heaton . She received 2 concurrent cycles of cisplatin and etoposide with chest radiation followed by consolidation with 2 additional cycles of chemotherapy. She did well for 3 years and was followed with surveillance and apparently her CT had shown a nonspecific sclerotic lesion at the level of the 3rd rib. She had developed an episode of herpes zoster last year  She has history of rheumatoid arthritis and was treated with Plaquenil and prednisone      She is now hospitalized through the emergency room with chest wall pain along with shortness of breath. Follow-up CT chest shows new area of obstruction and volume loss in the middle lobe highly suspicious for disease recurrence with airspace disease in the right lower lobe likely pneumonia.       Diagnostic Data:     Past Medical History:   Diagnosis Date    Cancer [facility]) lung    Depression 6/22/2016    Pneumonia     Rheumatoid arthritis ([facility]     Rheumatoid arthritis involving multiple sites with 3/29/19 4/28/19 Yes Kristyn Perez APRN - CNP   sertraline (ZOLOFT) 50 MG tablet Take 1 tablet by mouth daily 12/27/18  Yes ALLEN Kim - CNP   albuterol sulfate  (90 Base) MCG/ACT inhaler Inhale 2 puffs into the lungs every 6 hours as needed for Wheezing 12/27/18  Yes ALLEN Osborne CNP   budesonide-formoterol (SYMBICORT) 160-4.5 MCG/ACT AERO Inhale 2 puffs into the lungs 2 times daily  Patient taking differently: Inhale 2 puffs into the lungs 2 times daily as needed (Shortness of Breath/Wheezing)  12/27/18  Yes ALLEN Kim - CNP   alendronate (FOSAMAX) 70 MG tablet Take 70 mg by mouth once a week Monday   Yes Historical Provider, MD   hydroxychloroquine (PLAQUENIL) 200 MG tablet Take 200 mg by mouth daily   Yes Historical Provider, MD       Allergies  No Known Allergies    Review of Systems:  Relevant for progressively worsening shortness of breath with occasional cough atypical chest pain and discomfort as well as occasional joint aches  No weight loss    Objective  /68   Pulse 110   Temp 98.9 °F (37.2 °C) (Oral)   Resp 20   Ht 5' 2\" (1.575 m)   Wt 145 lb (65.8 kg)   LMP  (LMP Unknown)   SpO2 97%   BMI 26.52 kg/m²     Physical Performance Status : 0    Physical Exam:  General: AAO to person, place, time,no acute distress, pleasant   Head and neck : PERRLA, EOMI . Sclera non icteric. Oropharynx : Clear  Neck: no JVD,  no adenopathy  Lungs: Decreased breath sounds in right lung fields with scattered rhonchi  Heart: Regular rate and regular rhythm,  Abdomen: Soft, non-tender;no masses, no organomegaly  Extremities: No edema,no cyanosis, no clubbing. Skin:  No rash. Neurologic:Cranial nerves grossly intact. No focal motor or sensory deficits .     Recent Laboratory Data-   Lab Results   Component Value Date    WBC 16.2 (H) 04/12/2019    HGB 10.5 (L) 04/12/2019    HCT 32.5 (L) 04/12/2019    MCV 87.4 04/12/2019     04/12/2019    LYMPHOPCT 3.3 (L) 04/12/2019 RBC 3.72 04/12/2019    MCH 28.2 04/12/2019    MCHC 32.3 04/12/2019    RDW 15.1 (H) 04/12/2019    NEUTOPHILPCT 96.5 (H) 04/12/2019    MONOPCT 3.5 04/12/2019    BASOPCT 0.1 04/12/2019    NEUTROABS 15.71 (H) 04/12/2019    LYMPHSABS 0.00 (L) 04/12/2019    MONOSABS 0.65 04/12/2019    EOSABS 0.00 (L) 04/12/2019    BASOSABS 0.00 04/12/2019       Lab Results   Component Value Date     04/12/2019    K 3.8 04/12/2019     (H) 04/12/2019    CO2 23 04/12/2019    BUN 11 04/12/2019    CREATININE 0.5 04/12/2019    GLUCOSE 124 (H) 04/12/2019    CALCIUM 8.7 04/12/2019    PROT 7.2 04/12/2019    LABALBU 3.5 04/12/2019    BILITOT 0.2 04/12/2019    ALKPHOS 71 04/12/2019    AST 8 04/12/2019    ALT 6 04/12/2019    LABGLOM >60 04/12/2019    GFRAA >60 04/12/2019       No results found for: IRON, TIBC, FERRITIN        Radiology-    Xr Chest Portable    Result Date: 4/10/2019  LOCATION: 200 EXAM: XR CHEST PORTABLE COMPARISON: Prior stating back to January 2016 HISTORY: Shortness of breath TECHNIQUE: Single frontal view of the chest was obtained. FINDINGS:  SUPPORT DEVICES: None. LUNGS: There is increase in a paramediastinal opacity in the right upper lung zone measuring 4 cm in maximal dimension. This is concerning for recurrent malignancy. Left lung is clear. PLEURA: No effusions or pneumothorax. LUNG VOLUMES: Satisfactory inspirator effort. MEDIASTINAL STRUCTURES: No lymphadenopathy. Normal aortic contour. HEART SIZE: Normal. BONES AND SOFT TISSUES: No fracture or soft tissue abnormality. Increasing right paramediastinal opacity is concerning for recurrent malignancy. Chest CT should be obtained for further assessment.     Cta Chest W Contrast    Result Date: 4/10/2019  LOCATION:200 EXAM: CTA CHEST W CONTRAST COMPARISON: December 29, 2017 chest CT HISTORY:  History of lung cancer shortness of breath, new lung lesion TECHNIQUE: Axial CT images are obtained of the chest.  Coronal and sagittal reconstructions were obtained in September 2015 status post right upper lobe lobectomy at [address] [place] [address]. She had positive surgical margins and N1 disease and went on to receive concurrent cis-platinum and etoposide with chest radiation followed by 2 cycles of consolidation with cis-platinum and etoposide  Her treatments back then with complicated by neutropenic fevers. She was treated and followed by Dr. Ayo Farris at [facility] suspicion of disease recurrence in the right lung  - S/p bronchoscopy and tissue sampling this afternoon  - CT A/P w/o evidence of metastatic disease  - NM Bone scan negative for osseous involvement   - Radiation Oncology consulted.  Will determine if any additional form of radiation can be done if local recurrence established  - Can be DC'd from oncology POV once cleared by pulmonary and follow up with Dr. Mariel Miranda as an outpatient for pathology review      Anna Lopez MD  Electronically signed 4/12/2019 at 2:31 PM

## 2019-04-13 VITALS
OXYGEN SATURATION: 97 % | WEIGHT: 145 LBS | DIASTOLIC BLOOD PRESSURE: 74 MMHG | SYSTOLIC BLOOD PRESSURE: 112 MMHG | HEART RATE: 82 BPM | BODY MASS INDEX: 26.68 KG/M2 | HEIGHT: 62 IN | RESPIRATION RATE: 20 BRPM | TEMPERATURE: 98.2 F

## 2019-04-13 LAB
ANION GAP SERPL CALCULATED.3IONS-SCNC: 12 MMOL/L (ref 7–16)
BASOPHILS ABSOLUTE: 0.01 E9/L (ref 0–0.2)
BASOPHILS RELATIVE PERCENT: 0.1 % (ref 0–2)
BUN BLDV-MCNC: 9 MG/DL (ref 6–20)
CALCIUM SERPL-MCNC: 8.6 MG/DL (ref 8.6–10.2)
CHLORIDE BLD-SCNC: 106 MMOL/L (ref 98–107)
CO2: 22 MMOL/L (ref 22–29)
CREAT SERPL-MCNC: 0.6 MG/DL (ref 0.5–1)
EOSINOPHILS ABSOLUTE: 0 E9/L (ref 0.05–0.5)
EOSINOPHILS RELATIVE PERCENT: 0 % (ref 0–6)
GFR AFRICAN AMERICAN: >60
GFR NON-AFRICAN AMERICAN: >60 ML/MIN/1.73
GLUCOSE BLD-MCNC: 119 MG/DL (ref 74–99)
HCT VFR BLD CALC: 33.1 % (ref 34–48)
HEMOGLOBIN: 10.4 G/DL (ref 11.5–15.5)
IMMATURE GRANULOCYTES #: 0.08 E9/L
IMMATURE GRANULOCYTES %: 0.9 % (ref 0–5)
LYMPHOCYTES ABSOLUTE: 0.63 E9/L (ref 1.5–4)
LYMPHOCYTES RELATIVE PERCENT: 7.2 % (ref 20–42)
MCH RBC QN AUTO: 27.7 PG (ref 26–35)
MCHC RBC AUTO-ENTMCNC: 31.4 % (ref 32–34.5)
MCV RBC AUTO: 88 FL (ref 80–99.9)
MONOCYTES ABSOLUTE: 0.34 E9/L (ref 0.1–0.95)
MONOCYTES RELATIVE PERCENT: 3.9 % (ref 2–12)
NEUTROPHILS ABSOLUTE: 7.64 E9/L (ref 1.8–7.3)
NEUTROPHILS RELATIVE PERCENT: 87.9 % (ref 43–80)
PDW BLD-RTO: 15 FL (ref 11.5–15)
PLATELET # BLD: 256 E9/L (ref 130–450)
PMV BLD AUTO: 10.8 FL (ref 7–12)
POTASSIUM SERPL-SCNC: 3.6 MMOL/L (ref 3.5–5)
RBC # BLD: 3.76 E12/L (ref 3.5–5.5)
SODIUM BLD-SCNC: 140 MMOL/L (ref 132–146)
WBC # BLD: 8.7 E9/L (ref 4.5–11.5)

## 2019-04-13 PROCEDURE — 6360000002 HC RX W HCPCS: Performed by: INTERNAL MEDICINE

## 2019-04-13 PROCEDURE — 87070 CULTURE OTHR SPECIMN AEROBIC: CPT

## 2019-04-13 PROCEDURE — 36415 COLL VENOUS BLD VENIPUNCTURE: CPT

## 2019-04-13 PROCEDURE — 87206 SMEAR FLUORESCENT/ACID STAI: CPT

## 2019-04-13 PROCEDURE — 2580000003 HC RX 258: Performed by: INTERNAL MEDICINE

## 2019-04-13 PROCEDURE — 6370000000 HC RX 637 (ALT 250 FOR IP): Performed by: INTERNAL MEDICINE

## 2019-04-13 PROCEDURE — 85025 COMPLETE CBC W/AUTO DIFF WBC: CPT

## 2019-04-13 PROCEDURE — 94640 AIRWAY INHALATION TREATMENT: CPT

## 2019-04-13 PROCEDURE — 80048 BASIC METABOLIC PNL TOTAL CA: CPT

## 2019-04-13 RX ORDER — METHYLPREDNISOLONE 4 MG/1
TABLET ORAL
Qty: 1 KIT | Refills: 0 | Status: SHIPPED | OUTPATIENT
Start: 2019-04-13 | End: 2019-04-19

## 2019-04-13 RX ORDER — HEPARIN SODIUM (PORCINE) LOCK FLUSH IV SOLN 100 UNIT/ML 100 UNIT/ML
100 SOLUTION INTRAVENOUS PRN
Status: DISCONTINUED | OUTPATIENT
Start: 2019-04-13 | End: 2019-04-13 | Stop reason: HOSPADM

## 2019-04-13 RX ADMIN — METHYLPREDNISOLONE SODIUM SUCCINATE 40 MG: 40 INJECTION, POWDER, FOR SOLUTION INTRAMUSCULAR; INTRAVENOUS at 05:49

## 2019-04-13 RX ADMIN — SULFASALAZINE 1000 MG: 500 TABLET ORAL at 09:10

## 2019-04-13 RX ADMIN — SODIUM CHLORIDE: 9 INJECTION, SOLUTION INTRAVENOUS at 09:10

## 2019-04-13 RX ADMIN — IPRATROPIUM BROMIDE AND ALBUTEROL SULFATE 1 AMPULE: .5; 3 SOLUTION RESPIRATORY (INHALATION) at 13:24

## 2019-04-13 RX ADMIN — SERTRALINE HYDROCHLORIDE 50 MG: 50 TABLET ORAL at 10:21

## 2019-04-13 RX ADMIN — IPRATROPIUM BROMIDE AND ALBUTEROL SULFATE 1 AMPULE: .5; 3 SOLUTION RESPIRATORY (INHALATION) at 10:09

## 2019-04-13 RX ADMIN — METHYLPREDNISOLONE SODIUM SUCCINATE 40 MG: 40 INJECTION, POWDER, FOR SOLUTION INTRAMUSCULAR; INTRAVENOUS at 13:34

## 2019-04-13 RX ADMIN — AZITHROMYCIN MONOHYDRATE 500 MG: 250 TABLET ORAL at 11:21

## 2019-04-13 RX ADMIN — WATER 1 G: 1 INJECTION INTRAMUSCULAR; INTRAVENOUS; SUBCUTANEOUS at 11:21

## 2019-04-13 ASSESSMENT — PAIN SCALES - GENERAL: PAINLEVEL_OUTOF10: 0

## 2019-04-13 NOTE — PLAN OF CARE
Problem: Falls - Risk of:  Goal: Will remain free from falls  Description  Will remain free from falls  4/12/2019 2352 by Ryann Fountain RN  Outcome: Met This Shift  4/12/2019 1300 by Rangel Cloud RN  Outcome: Met This Shift  Goal: Absence of physical injury  Description  Absence of physical injury  4/12/2019 2352 by Ryann Fountain RN  Outcome: Met This Shift  4/12/2019 1300 by Rangel Cloud RN  Outcome: Met This Shift     Problem: Breathing Pattern - Ineffective:  Goal: Ability to achieve and maintain a regular respiratory rate will improve  Description  Ability to achieve and maintain a regular respiratory rate will improve  4/12/2019 2352 by Ryann Fountain RN  Outcome: Met This Shift  4/12/2019 1300 by Rangel Cloud RN  Outcome: Met This Shift

## 2019-04-13 NOTE — DISCHARGE SUMMARY
106 04/13/2019    CREATININE 0.6 04/13/2019    BUN 9 04/13/2019    CO2 22 04/13/2019    GLUCOSE 119 (H) 04/13/2019    ALT 6 04/12/2019    AST 8 04/12/2019    INR 1.1 04/12/2019     Lab Results   Component Value Date    INR 1.1 04/12/2019    INR 1.2 04/11/2019    INR 1.1 01/04/2016    PROTIME 12.1 04/12/2019    PROTIME 13.7 (H) 04/11/2019    PROTIME 11.4 01/04/2016      Lab Results   Component Value Date    TSH 2.680 04/11/2019     No results found for: TRIG  No results found for: HDL  No results found for: LDLCALC  No results found for: LABA1C    IMAGING:  Ct Abdomen Pelvis W Iv Contrast Additional Contrast? None    Result Date: 4/11/2019  Reading location:  200 HISTORY: 60-year-old female with history of lung CA, status post lobectomy and chemotherapy treatment. TECHNIQUE: Serial axial images of the abdomen and pelvis were obtained following the uneventful administration of 80 cc of Isovue-370 intravenous contrast. Reformatted sagittal and coronal images were obtained. One or more of the following dose reduction techniques were used: Automated dose exposure. Adjustment of the mA and/or kV according to patient size Use of iterative reconstruction techniques FINDINGS: The liver is homogeneous. There is no enhancing lesion seen within the liver to suggest metastatic disease. The spleen, pancreas and adrenal glands are unremarkable. The kidneys are normal in appearance. Normal cortical enhancement is noted. There is no ureteral calculus. There is no bladder mass or bladder calculus. There is a 7.5 mm calculus within the gallbladder lumen. There is a small amount of sludge within the gallbladder lumen. There is no wall thickening or pericholecystic fluid. The stomach is normally distended. There is no large or small bowel obstruction. No right lower quadrant inflammatory changes are noted. There is no pelvic mass, fluid collection or free air. The abdominal aorta is normal in caliber.  There is no retroperitoneal lymphadenopathy. Bone windows of the lumbar spine are negative for any lytic or blastic lesion. 1. There is no acute intra-abdominal or intrapelvic pathology. Specifically, there is no evidence of metastatic disease to the liver or adrenal glands. 2. 8 mm stone and small amount of sludge within the gallbladder lumen. There is no evidence of acute cholecystitis. There is no osseous metastasis. Xr Chest Portable    Result Date: 4/10/2019  LOCATION: 200 EXAM: XR CHEST PORTABLE COMPARISON: Prior stating back to January 2016 HISTORY: Shortness of breath TECHNIQUE: Single frontal view of the chest was obtained. FINDINGS:  SUPPORT DEVICES: None. LUNGS: There is increase in a paramediastinal opacity in the right upper lung zone measuring 4 cm in maximal dimension. This is concerning for recurrent malignancy. Left lung is clear. PLEURA: No effusions or pneumothorax. LUNG VOLUMES: Satisfactory inspirator effort. MEDIASTINAL STRUCTURES: No lymphadenopathy. Normal aortic contour. HEART SIZE: Normal. BONES AND SOFT TISSUES: No fracture or soft tissue abnormality. Increasing right paramediastinal opacity is concerning for recurrent malignancy. Chest CT should be obtained for further assessment. Cta Chest W Contrast    Result Date: 4/10/2019  LOCATION:200 EXAM: CTA CHEST W CONTRAST COMPARISON: December 29, 2017 chest CT HISTORY:  History of lung cancer shortness of breath, new lung lesion TECHNIQUE: Axial CT images are obtained of the chest.  Coronal and sagittal reconstructions were obtained with 3-D maximum intensity projection (MIP) reconstructed images. These were performed on a separate workstation with concurrent supervision for detailed evaluation of the pulmonary arteries. CONTRAST: 80 mL Isovue-370 intravenous contrast. FINDINGS: SUPPORT DEVICES: None LUNGS/CENTRAL AIRWAYS/PLEURA: Postsurgical changes from right upper lobe lobectomy.  There is increasing confluent airspace disease and soft tissue in the right lung apex at the level of the surgical staple line with adjacent surgical staples. The consolidation extends into the anterior aspect of the right upper lobe with volume loss of the middle lobe. The middle lobe bronchus is no longer visualized. The amount of confluent airspace disease in the area of the radiation field appears slightly increased from the previous study. There is moderate to severe emphysema throughout the left lung. No lung nodules are identified. PULMONARY ARTERIES: Evaluation is adequate for pulmonary embolus. No filling defects identified to the subsegmental level. HEART/PERICARDIUM/GREAT VESSELS: Cardiac size is normal.  There is no pericardial effusion. The great vessels of the chest are normal in caliber. LYMPH NODES: No thoracic adenopathy by size criteria. NECK BASE/CHEST WALL/DIAPHRAGM: No soft tissue lesions or diaphragmatic abnormality. UPPER ABDOMEN: Limited images through the upper abdomen are unremarkable. OSSEOUS STRUCTURES: No suspicious lytic or blastic lesions. No fractures. 1. There is new obstruction and volume loss of the middle lobe bronchus and middle lobe. This is concerning for localized recurrence at the level of the lobectomy site. Consider follow-up with direct visualization. 2. Slight increase in airspace disease involving the posterior aspect of the right lower lobe, likely infectious. 3. No new lung nodules or lymphadenopathy. Nm Bone Scan Whole Body    Result Date: 4/11/2019  LOCATION: 200 EXAM: NM BONE SCAN WHOLE BODY COMPARISON: 4/10/2019 CT HISTORY: Lung cancer TECHNIQUE: 25.8 mCi of MDP administered to the patient. FINDINGS: Uniform activity is seen in the skeleton. No focal areas of abnormal uptake identified. No scintigraphic evidence of metastatic disease. HOSPITAL COURSE:   Graciela's respiratory status improved dramatically throughout her hospital stay. She was evaluated by the oncologic and pulmonary teams.  She underwent bronchoscopic evaluation yesterday and no endobronchial lesions were identified. Biopsy was not obtainable and recommendations are for endobronchial ultrasound-guided biopsy. As the patient follows at [facility] in [facility] [employer], patient is requesting this procedure be performed there as we are incapable of performing the procedure here. As it is the weekend, I will contact the patient's primary oncologist, Dr. Gregg Owen this Monday to arrange. The patient and her  are aware of the situation and will also call that office to arrange an appointment. The patient's shortness of breath entirely resolved. During the bronchoscopic procedure mucous plugging was removed which resulted in significant improvement in her respiratory status. She will utilize inhalers as needed at home. She will complete a course of corticosteroids. Again, we suspect recurrence of her underlying malignant process. She requires extensive follow-up that will include PET scan and EBUS with the pulmonary team. The patient and her  are very appreciative of the care she received here and understand the importance of close outpatient follow-up. Discussed the case with both the pulmonary as well as oncologic teams. BRIEF PHYSICAL EXAMINATION AND LABORATORIES ON DAY OF DISCHARGE:  VITALS:  /74   Pulse 82   Temp 98.2 °F (36.8 °C) (Oral)   Resp 20   Ht 5' 2\" (1.575 m)   Wt 145 lb (65.8 kg)   LMP  (LMP Unknown)   SpO2 97%   BMI 26.52 kg/m²     HEENT:  PERRLA. EOMI. Sclera clear. Buccal mucosa moist.    Neck:  Supple. Trachea midline. No thyromegaly. No JVD. No bruits. Heart:  Rhythm regular, rate controlled. No murmurs. Lungs:  Symmetrical. Clear to auscultation bilaterally. No wheezes. No rhonchi. No rales. Abdomen: Soft. Non-tender. Non-distended. Bowel sounds positive. No organomegaly or masses. No pain on palpation    Extremities:  Peripheral pulses present. No peripheral edema. No ulcers. Neurologic:  Alert x 3. No focal deficit. Cranial nerves grossly intact. Skin:  No petechia. No hemorrhage. No wounds. DISPOSITION:  The patient's condition is good. At this time the patient is without objective evidence of an acute process requiring continuing hospitalization or inpatient management. They are stable for discharge with outpatient follow-up. I have spoken with the patient and discussed the results of the current hospitalization, in addition to providing specific details for the plan of care and counseling regarding the diagnosis and prognosis. The plan has been discussed in detail and they are aware of the specific conditions for emergent return, as well as the importance of follow-up. Their questions are answered at this time and they are agreeable with the plan for discharge to home    DISCHARGE MEDICATIONS:    Hawk Saenz   Home Medication Instructions BJK:[id number]    Printed on:04/13/19 1256   Medication Information                      albuterol sulfate  (90 Base) MCG/ACT inhaler  Inhale 2 puffs into the lungs every 6 hours as needed for Wheezing             alendronate (FOSAMAX) 70 MG tablet  Take 70 mg by mouth once a week Monday             Ascorbic Acid (VITAMIN C PO)  Take 1 tablet by mouth daily             budesonide-formoterol (SYMBICORT) 160-4.5 MCG/ACT AERO  Inhale 2 puffs into the lungs 2 times daily             hydroxychloroquine (PLAQUENIL) 200 MG tablet  Take 200 mg by mouth daily             influenza virus trivalent vaccine (FLUZONE) injection  Inject 0.5 mLs into the muscle once Given 9/7/2018             methylPREDNISolone (MEDROL DOSEPACK) 4 MG tablet  Take by mouth. sertraline (ZOLOFT) 50 MG tablet  Take 1 tablet by mouth daily             sulfaSALAzine (AZULFIDINE) 500 MG tablet  Take 1,000 mg by mouth 2 times daily                 FOLLOW UP/INSTRUCTIONS:  · This patient is instructed to follow-up with her primary care physician.   · Patient is instructed to

## 2019-04-14 ENCOUNTER — CARE COORDINATION (OUTPATIENT)
Dept: CASE MANAGEMENT | Age: 49
End: 2019-04-14

## 2019-04-14 NOTE — CARE COORDINATION
Attempted 24 hour call. Left message for patient to return call. 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

## 2019-04-15 ENCOUNTER — TELEPHONE (OUTPATIENT)
Dept: FAMILY MEDICINE CLINIC | Age: 49
End: 2019-04-15

## 2019-04-15 DIAGNOSIS — C34.91 BRONCHOGENIC CARCINOMA OF RIGHT LUNG (HCC): Primary | ICD-10-CM

## 2019-04-15 LAB
BLOOD CULTURE, ROUTINE: NORMAL
CULTURE, RESPIRATORY: NORMAL
SMEAR, RESPIRATORY: NORMAL

## 2019-04-15 NOTE — CARE COORDINATION
Steve [facility] Transitions Initial Follow Up Call    Call within 2 business days of discharge: Yes    Patient: Supriya Raman Patient : 1970   MRN: <[id number]>  Reason for Admission: Lung mass  Discharge Date: 19 RARS: Readmission Risk Score: 14      Last Discharge [address] [id number]       Complaint Diagnosis Description Type Department Provider    4/10/19 Shortness of Breath Community acquired pneumonia of right lung, unspecified part of lung . .. ED to Hosp-Admission (Discharged) (ADMITTED) [facility] [address] [facility], DO; Mikle Devoid Ca. .. Spoke with: Supriya Raman (Patient)    Facility: [facility]    Non-face-to-face services provided:  Scheduled appointment with PCP-Per pateint, she has a call out to PCP to [facility] getting biopsy and follow up visit scheudled. Denies CTC assistance at this time. Obtained and reviewed discharge summary and/or continuity of care documents  Assessment and support for treatment adherence and medication management-CTC discussed the importacne to taper off Medrol dose pack and take as directed until completely finished [facility] patient verbalizes understanding. Care Transitions 24 Hour Call    Schedule Follow Up Appointment with PCP:  Declined  Do you have any ongoing symptoms?:  Yes  Patient-reported symptoms:  Shortness of Breath, Cough  Do you have a copy of your discharge instructions?:  Yes  Do you have all of your prescriptions and are they filled?:  Yes  Have you been contacted by a [facility] Pharmacist?:  No  Have you scheduled your follow up appointment?:  No  Were you discharged with any Home Care or Post Acute Services:  No  Do you feel like you have everything you need to keep you well at home?:  Yes  Care Transitions Interventions       Spoke with patient today 4/15/19 for initial TCM/hospital discharge follow up call for lung mass. Pateint reports she continues having shortness of breath with exertion only.  States having associated productive cough producing white/thick phlegm. Denies any chest pain or chest discomfort. Confirmed she obtained medrol dose pack newly ordered on discharge and is taking as directed. Emphasized the importance to taper off Prednisone as directed which she verbalizes understanding. Confirmed she is awaiting call back from PCP office regarding getting biopsy and scheduling follow up visit as she denies needing CTC assistance. Confirmed she is s.p bronchoscopy while IP but didn't get a biopsy. CTA of chest was obtained on admission noted to show the following below:     1. There is new obstruction and volume loss of the middle lobe   bronchus and middle lobe. This is concerning for localized recurrence   at the level of the lobectomy site. Consider follow-up with direct   visualization. 2. Slight increase in airspace disease involving the posterior aspect   of the right lower lobe, likely infectious. 3. No new lung nodules or lymphadenopathy. States having a partial right lobectomy performed din past and is followed by Dr. Niki Farr at [facility] for oncology. States she completed both chemotherapy and radiation therapy three years ago. Denies being a current smoker. Denies any other complaints, concerns or needs at this time. 1111F code entered. Patient receptive for CTC to continue following for Care Transition. Follow Up  No future appointments.     ALLEN Bhatti

## 2019-04-16 LAB — CULTURE, BLOOD 2: NORMAL

## 2019-04-17 ENCOUNTER — CARE COORDINATION (OUTPATIENT)
Dept: CASE MANAGEMENT | Age: 49
End: 2019-04-17

## 2019-04-18 ENCOUNTER — OFFICE VISIT (OUTPATIENT)
Dept: FAMILY MEDICINE CLINIC | Age: 49
End: 2019-04-18
Payer: COMMERCIAL

## 2019-04-18 VITALS
HEIGHT: 62 IN | BODY MASS INDEX: 25.4 KG/M2 | WEIGHT: 138 LBS | OXYGEN SATURATION: 97 % | TEMPERATURE: 98.5 F | DIASTOLIC BLOOD PRESSURE: 66 MMHG | HEART RATE: 97 BPM | RESPIRATION RATE: 16 BRPM | SYSTOLIC BLOOD PRESSURE: 112 MMHG

## 2019-04-18 DIAGNOSIS — C34.91 BRONCHOGENIC CARCINOMA OF RIGHT LUNG (HCC): Primary | ICD-10-CM

## 2019-04-18 PROCEDURE — 99213 OFFICE O/P EST LOW 20 MIN: CPT | Performed by: FAMILY MEDICINE

## 2019-04-18 ASSESSMENT — ENCOUNTER SYMPTOMS
APNEA: 0
ABDOMINAL DISTENTION: 0
CHEST TIGHTNESS: 0

## 2019-04-18 NOTE — PROGRESS NOTES
Will have bronchoscopy on 5/3/2019. HPI:  Patient comes in today for   Chief Complaint   Patient presents with    Pneumonia     Pt was recently released from [facility] for pneumonia. Pt is now seeing pulmonology in [facility] and would like to discuss a biospy of her lungs due to an abnormal detection on her CT, and bone scans. .          Review of Systems  Review of Systems   Constitutional: Negative for activity change, appetite change and diaphoresis. HENT: Negative for congestion, dental problem, drooling and mouth sores. Respiratory: Negative for apnea and chest tightness. Cardiovascular: Positive for chest pain (right posterior chest and decreased breathing). Gastrointestinal: Negative for abdominal distention. Genitourinary: Negative for difficulty urinating and dysuria. Musculoskeletal: Negative for arthralgias. Neurological: Negative for dizziness, light-headedness and headaches. Psychiatric/Behavioral: Negative for agitation. PE:  VS:  /66   Pulse 97   Temp 98.5 °F (36.9 °C) (Oral)   Resp 16   Ht 5' 2\" (1.575 m)   Wt 138 lb (62.6 kg)   LMP  (LMP Unknown)   SpO2 97%   Breastfeeding? No   BMI 25.24 kg/m²   Physical Exam   Constitutional: She appears well-developed. HENT:   Head: Normocephalic. Eyes: Pupils are equal, round, and reactive to light. Neck: Normal range of motion. No tracheal deviation present. No thyromegaly present. Cardiovascular: Normal rate and regular rhythm. Pulmonary/Chest: Effort normal.   Abdominal: Soft. She exhibits no distension. There is no tenderness. Musculoskeletal: Normal range of motion. Neurological: She is alert. Skin: Skin is warm. Capillary refill takes less than 2 seconds. Psychiatric: She has a normal mood and affect. ASSESSMENT/PLAN:    1. Bronchogenic carcinoma of right lung ([address]        OBINNA Bravo.

## 2019-04-22 ENCOUNTER — CARE COORDINATION (OUTPATIENT)
Dept: CASE MANAGEMENT | Age: 49
End: 2019-04-22

## 2019-04-23 ENCOUNTER — TELEPHONE (OUTPATIENT)
Dept: FAMILY MEDICINE CLINIC | Age: 49
End: 2019-04-23

## 2019-04-25 NOTE — CARE COORDINATION
Steve 45 Transitions Follow Up Call    2019    Patient: Jasmyn Painter  Patient : 1970   MRN: [id number]  Reason for Admission: Lung mass/PNA  Discharge Date: 19 RARS: Readmission Risk Score: 14       Attempted to contact patient today 19 for TCM/hospital discharge follow up sub call for lung mass/PNA. Left message requesting a return call back to [facility] and provided contact information. Follow Up  No future appointments.     Kristyn Gan, APRN

## 2019-04-29 NOTE — CARE COORDINATION
[facility] Transitions Follow Up Call    2019    Patient: Andres Cristina  Patient : 1970   MRN: [id number]  Reason for Admission: Lung Mass  Discharge Date: 19 RARS: Readmission Risk Score: 14         Spoke with: Left VM    LSW attempted to contact [employer] for Final care transition follow up call. There was no answer. LSW left a detailed VM identifying self, role, reason for call, that this was the final outreach call for CTC, and left a CTC call back number in case [employer] had any questions, concerns, or issues. CTC will sign off today. Follow Up  No future appointments.     JAS Quiles, LSW

## 2019-05-24 ENCOUNTER — APPOINTMENT (OUTPATIENT)
Dept: GENERAL RADIOLOGY | Age: 49
End: 2019-05-24
Payer: COMMERCIAL

## 2019-05-24 ENCOUNTER — HOSPITAL ENCOUNTER (EMERGENCY)
Age: 49
Discharge: HOME | End: 2019-05-24
Attending: EMERGENCY MEDICINE
Payer: COMMERCIAL

## 2019-05-24 VITALS
HEIGHT: 62 IN | OXYGEN SATURATION: 99 % | TEMPERATURE: 98.3 F | HEART RATE: 90 BPM | DIASTOLIC BLOOD PRESSURE: 76 MMHG | BODY MASS INDEX: 25.58 KG/M2 | SYSTOLIC BLOOD PRESSURE: 108 MMHG | RESPIRATION RATE: 17 BRPM | WEIGHT: 139 LBS

## 2019-05-24 DIAGNOSIS — R06.02 SHORTNESS OF BREATH: ICD-10-CM

## 2019-05-24 DIAGNOSIS — R29.0 CARPOPEDAL SPASM: Primary | ICD-10-CM

## 2019-05-24 LAB
B.E.: -2.2 MMOL/L (ref -3–3)
COHB: 0.8 % (ref 0–1.5)
CRITICAL: ABNORMAL
DATE ANALYZED: ABNORMAL
DATE OF COLLECTION: ABNORMAL
EKG ATRIAL RATE: 96 BPM
EKG P AXIS: 67 DEGREES
EKG P-R INTERVAL: 140 MS
EKG Q-T INTERVAL: 386 MS
EKG QRS DURATION: 76 MS
EKG QTC CALCULATION (BAZETT): 487 MS
EKG R AXIS: 47 DEGREES
EKG T AXIS: 54 DEGREES
EKG VENTRICULAR RATE: 96 BPM
FIO2: 100 %
HCO3: 19.9 MMOL/L (ref 22–26)
HHB: 1.2 % (ref 0–5)
LAB: ABNORMAL
Lab: ABNORMAL
METHB: 0.4 % (ref 0–1.5)
MODE: ABNORMAL
O2 CONTENT: 18.3 ML/DL
O2 SATURATION: 98.8 % (ref 92–98.5)
O2HB: 97.6 % (ref 94–97)
OPERATOR ID: 30
PATIENT TEMP: 37 C
PCO2: 27 MMHG (ref 35–45)
PFO2: 1.81 MMHG/%
PH BLOOD GAS: 7.49 (ref 7.35–7.45)
PO2: 181 MMHG (ref 60–100)
RI(T): 2.65
SOURCE, BLOOD GAS: ABNORMAL
THB: 13.1 G/DL (ref 11.5–16.5)
TIME ANALYZED: 1606

## 2019-05-24 PROCEDURE — 93010 ELECTROCARDIOGRAM REPORT: CPT | Performed by: INTERNAL MEDICINE

## 2019-05-24 PROCEDURE — 6370000000 HC RX 637 (ALT 250 FOR IP): Performed by: STUDENT IN AN ORGANIZED HEALTH CARE EDUCATION/TRAINING PROGRAM

## 2019-05-24 PROCEDURE — 71045 X-RAY EXAM CHEST 1 VIEW: CPT

## 2019-05-24 PROCEDURE — 82805 BLOOD GASES W/O2 SATURATION: CPT

## 2019-05-24 PROCEDURE — 93005 ELECTROCARDIOGRAM TRACING: CPT | Performed by: STUDENT IN AN ORGANIZED HEALTH CARE EDUCATION/TRAINING PROGRAM

## 2019-05-24 PROCEDURE — 99285 EMERGENCY DEPT VISIT HI MDM: CPT

## 2019-05-24 RX ORDER — LORAZEPAM 1 MG/1
2 TABLET ORAL ONCE
Status: COMPLETED | OUTPATIENT
Start: 2019-05-24 | End: 2019-05-24

## 2019-05-24 RX ADMIN — LORAZEPAM 2 MG: 1 TABLET ORAL at 15:33

## 2019-05-24 ASSESSMENT — ENCOUNTER SYMPTOMS
BACK PAIN: 0
EYE PAIN: 0
NAUSEA: 0
DIARRHEA: 0
COUGH: 0
WHEEZING: 0
EYE REDNESS: 0
ABDOMINAL PAIN: 0
EYE DISCHARGE: 0
SINUS PRESSURE: 0
SORE THROAT: 0
VOMITING: 0
SHORTNESS OF BREATH: 1
ABDOMINAL DISTENTION: 0

## 2019-05-24 NOTE — ED PROVIDER NOTES
Patient is a 52-year-old female with history of lung cancer and rheumatoid arthritis who is presenting with shortness of breath and contracture of the hands and feet with associated numbness and tingling. She states earlier today she was outside working over at [employer] when she started to feel somewhat short of breath. Said when she got car and started driving she developed contractures of her hands as well as numbness and tingling from the elbow down to the distal fingertips as well as from her knees to her toes. Currently she states she is unable to open her hand completely. She denies any chest pain, abdominal pain, fevers or recent illnesses. The history is provided by the patient. Shortness of Breath   Severity:  Mild  Onset quality:  Sudden  Duration:  2 hours  Timing:  Constant  Progression:  Unchanged  Chronicity:  New  Context: activity    Relieved by:  Rest and oxygen  Worsened by: Activity, movement and exertion  Ineffective treatments:  None tried  Associated symptoms: no abdominal pain, no chest pain, no cough, no ear pain, no fever, no headaches, no rash, no sore throat, no vomiting and no wheezing        Review of Systems   Constitutional: Negative for chills and fever. HENT: Negative for ear pain, sinus pressure and sore throat. Eyes: Negative for pain, discharge and redness. Respiratory: Positive for shortness of breath. Negative for cough and wheezing. Cardiovascular: Negative for chest pain. Gastrointestinal: Negative for abdominal distention, abdominal pain, diarrhea, nausea and vomiting. Genitourinary: Negative for dysuria and frequency. Musculoskeletal: Negative for arthralgias and back pain. Skin: Negative for rash and wound. Neurological: Negative for weakness and headaches. Hematological: Negative for adenopathy. All other systems reviewed and are negative. Physical Exam   Constitutional: She is oriented to person, place, and time.  She appears well-developed and well-nourished. HENT:   Head: Normocephalic and atraumatic. Eyes: Pupils are equal, round, and reactive to light. Neck: Normal range of motion. Neck supple. Cardiovascular: Normal rate and regular rhythm. Pulmonary/Chest: No respiratory distress. She has no wheezes. She has no rales. Decreased breath sounds b/l   Abdominal: Soft. Bowel sounds are normal. There is no tenderness. There is no rebound and no guarding. Musculoskeletal: She exhibits no edema. Hands contracted; unable to open or completely close them actively. Passively FROM. Neurological: She is alert and oriented to person, place, and time. No cranial nerve deficit. Coordination normal.   Skin: Skin is warm and dry. Nursing note and vitals reviewed. Procedures    MDM  Number of Diagnoses or Management Options  Diagnosis management comments: Patient is a 51 yr old female with a hx of rheumatoid and lung cancer presenting with shortness of breath and contracture of her hands and feet with associated numbness and tingling. The shortness of breath was evident a couple of hours ago and half an hour prior to arriving she was in the car when she suddenly noticed numbness and tingling in her hands and feet and she was unable to open her hands or completely close them. She said that she has been hydrating appropriately today and this has never happened before. We shall obtain a CXR as well as an ABG and she does appear somewhat agitated at this time so she will be given PO Ativan to help her relax. She was tachycardic in triage but has since fallen below 100. It appears the issues with her hands are due to a carpopedal spasm. ED Course as of May 24 1700   Fri May 24, 2019   1626 Patient is much more relaxed now and is able to move her hands fully. [AL]   5888 Patient found sleeping while on NRB. She states she feels much better now. She will be discharged with appropriate follow up instructions.      [AL] 18.3 mL/dL    HHb 1.2 0.0 - 5.0 %    tHb (est) 13.1 11.5 - 16.5 g/dL    Mode NRB 15L     FIO2 100.0 %    Date Of Collection      Time Collected      Pt Temp 37.0 C     ID 30     Lab [id number]     Critical(s) Notified . No Critical Values        Radiology:  XR CHEST PORTABLE   Final Result   1. There is no acute cardiopulmonary disease. .   2. Postsurgical changes and possibly postradiation changes of the   right upper lobe.                ------------------------- NURSING NOTES AND VITALS REVIEWED ---------------------------  Date / Time Roomed:  5/24/2019  3:01 PM  ED Bed Assignment:  14/14    The nursing notes within the ED encounter and vital signs as below have been reviewed. /76   Pulse 90   Temp 98.3 °F (36.8 °C) (Oral)   Resp 17   Ht 5' 2\" (1.575 m)   Wt 139 lb (63 kg)   LMP  (LMP Unknown)   SpO2 99%   BMI 25.42 kg/m²   Oxygen Saturation Interpretation: Normal      ------------------------------------------ PROGRESS NOTES ------------------------------------------  4:59 PM  I have spoken with the patient and discussed todays results, in addition to providing specific details for the plan of care and counseling regarding the diagnosis and prognosis. Their questions are answered at this time and they are agreeable with the plan. I discussed at length with them reasons for immediate return here for re evaluation. They will followup with their primary care physician by calling their office tomorrow. --------------------------------- ADDITIONAL PROVIDER NOTES ---------------------------------  At this time the patient is without objective evidence of an acute process requiring hospitalization or inpatient management. They have remained hemodynamically stable throughout their entire ED visit and are stable for discharge with outpatient follow-up.      The plan has been discussed in detail and they are aware of the specific conditions for emergent return, as well as the importance of follow-up. New Prescriptions    No medications on file       Diagnosis:  1. Carpopedal spasm    2. Shortness of breath        Disposition:  Patient's disposition: Discharge to home  Patient's condition is stable.        Ra Rivera DO  Resident  05/24/19 [id number]

## 2019-06-21 ENCOUNTER — OFFICE VISIT (OUTPATIENT)
Dept: FAMILY MEDICINE CLINIC | Age: 49
End: 2019-06-21
Payer: COMMERCIAL

## 2019-06-21 VITALS
SYSTOLIC BLOOD PRESSURE: 112 MMHG | OXYGEN SATURATION: 99 % | HEIGHT: 62 IN | RESPIRATION RATE: 16 BRPM | WEIGHT: 141 LBS | TEMPERATURE: 98.9 F | DIASTOLIC BLOOD PRESSURE: 56 MMHG | HEART RATE: 80 BPM | BODY MASS INDEX: 25.95 KG/M2

## 2019-06-21 DIAGNOSIS — J40 SINOBRONCHITIS: ICD-10-CM

## 2019-06-21 DIAGNOSIS — F41.9 ANXIETY: ICD-10-CM

## 2019-06-21 DIAGNOSIS — M05.79 RHEUMATOID ARTHRITIS INVOLVING MULTIPLE SITES WITH POSITIVE RHEUMATOID FACTOR (HCC): Primary | ICD-10-CM

## 2019-06-21 DIAGNOSIS — J32.9 SINOBRONCHITIS: ICD-10-CM

## 2019-06-21 PROCEDURE — 99213 OFFICE O/P EST LOW 20 MIN: CPT | Performed by: NURSE PRACTITIONER

## 2019-06-21 RX ORDER — HYDROCODONE BITARTRATE AND ACETAMINOPHEN 5; 325 MG/1; MG/1
1 TABLET ORAL EVERY 8 HOURS PRN
COMMUNITY
End: 2019-06-21 | Stop reason: SDUPTHER

## 2019-06-21 RX ORDER — BUDESONIDE AND FORMOTEROL FUMARATE DIHYDRATE 160; 4.5 UG/1; UG/1
2 AEROSOL RESPIRATORY (INHALATION) 2 TIMES DAILY
Qty: 10.2 G | Refills: 2 | Status: SHIPPED
Start: 2019-06-21 | End: 2020-06-26

## 2019-06-21 RX ORDER — HYDROCODONE BITARTRATE AND ACETAMINOPHEN 5; 325 MG/1; MG/1
1 TABLET ORAL EVERY 8 HOURS PRN
Qty: 90 TABLET | Refills: 0 | Status: SHIPPED | OUTPATIENT
Start: 2019-06-21 | End: 2019-10-04 | Stop reason: SDUPTHER

## 2019-06-21 ASSESSMENT — ENCOUNTER SYMPTOMS
COUGH: 0
CONSTIPATION: 0
NAUSEA: 0
VOMITING: 0
DIARRHEA: 0
SHORTNESS OF BREATH: 0
WHEEZING: 0

## 2019-06-21 NOTE — PROGRESS NOTES
HPI:  Patient comes in today for   Chief Complaint   Patient presents with    Arthritis     refills rates pain at 8/7        [place] Mckenna presents for pain follow up for RA. Complains of pain in multiple joints. Affected by weather. Not much relief from heat or ice. Controlled with current medication. Also had lung biopsy on spot in right lung around beginning of May at [facility] in [place]. No calls about pathology being suspect. Will follow up with CT chest every 3 months. No smoking currently. Prior to Visit Medications    Medication Sig Taking? Authorizing Provider   HYDROcodone-acetaminophen (NORCO) 5-325 MG per tablet Take 1 tablet by mouth every 8 hours as needed for Pain for up to 30 days. Yes ALLEN Fernandes CNP   budesonide-formoterol (SYMBICORT) 160-4.5 MCG/ACT AERO Inhale 2 puffs into the lungs 2 times daily Yes ALLEN Fernandes CNP   sertraline (ZOLOFT) 50 MG tablet Take 1 tablet by mouth daily Yes ALLEN Fernandes CNP   sulfaSALAzine (AZULFIDINE) 500 MG tablet Take 1,000 mg by mouth 2 times daily Yes Historical Provider, MD   Ascorbic Acid (VITAMIN C PO) Take 1 tablet by mouth daily Yes Historical Provider, MD   albuterol sulfate  (90 Base) MCG/ACT inhaler Inhale 2 puffs into the lungs every 6 hours as needed for Wheezing Yes ALLEN Santana CNP   alendronate (FOSAMAX) 70 MG tablet Take 70 mg by mouth once a week Monday Yes Historical Provider, MD   hydroxychloroquine (PLAQUENIL) 200 MG tablet Take 200 mg by mouth daily Yes Historical Provider, MD       No Known Allergies    Review of Systems  Review of Systems   Constitutional: Negative for appetite change and fatigue. Respiratory: Negative for cough, shortness of breath and wheezing. Cardiovascular: Negative for chest pain and palpitations. Gastrointestinal: Negative for constipation, diarrhea, nausea and vomiting. Genitourinary: Negative for difficulty urinating.    Musculoskeletal: Multiple joints are diffusely painful, ROM limited in shoulders and hips due to pain   Neurological: Negative for dizziness, weakness and headaches. Psychiatric/Behavioral: Negative for sleep disturbance. The patient is not nervous/anxious. VS:  BP (!) 112/56   Pulse 80   Temp 98.9 °F (37.2 °C) (Oral)   Resp 16   Ht 5' 2\" (1.575 m)   Wt 141 lb (64 kg)   LMP  (LMP Unknown)   SpO2 99%   BMI 25.79 kg/m²     Physical Exam  Physical Exam   Constitutional: She is oriented to person, place, and time. She appears well-developed and well-nourished. HENT:   Head: Normocephalic and atraumatic. Neck: No tracheal deviation present. No thyromegaly present. Cardiovascular: Normal rate and regular rhythm. No murmur heard. Pulmonary/Chest: Effort normal and breath sounds normal.   Abdominal: Soft. Bowel sounds are normal. There is no tenderness. Musculoskeletal: Normal range of motion. She exhibits tenderness. Diffusely tender to bilateral shoulder and hips   Lymphadenopathy:     She has no cervical adenopathy. Neurological: She is alert and oriented to person, place, and time. Skin: Skin is warm and dry. Psychiatric: She has a normal mood and affect. Her behavior is normal.     Assessment/Plan:  Jose Raul Pyle was seen today for arthritis. Diagnoses and all orders for this visit:    Rheumatoid arthritis involving multiple sites with positive rheumatoid factor (HCC)  -     HYDROcodone-acetaminophen (NORCO) 5-325 MG per tablet; Take 1 tablet by mouth every 8 hours as needed for Pain for up to 30 days.  - Reviewed side effects of medication and patient verbalizes understanding.   - The current medical regimen is effective;  continue present plan and medications. Sinobronchitis  -     budesonide-formoterol (SYMBICORT) 160-4.5 MCG/ACT AERO; Inhale 2 puffs into the lungs 2 times daily  - The current medical regimen is effective;  continue present plan and medications.     Anxiety  -     sertraline (ZOLOFT)

## 2019-09-26 DIAGNOSIS — M05.79 RHEUMATOID ARTHRITIS INVOLVING MULTIPLE SITES WITH POSITIVE RHEUMATOID FACTOR (HCC): ICD-10-CM

## 2019-09-26 RX ORDER — HYDROCODONE BITARTRATE AND ACETAMINOPHEN 5; 325 MG/1; MG/1
1 TABLET ORAL EVERY 8 HOURS PRN
Qty: 90 TABLET | Refills: 0 | OUTPATIENT
Start: 2019-09-26 | End: 2019-10-26

## 2019-10-04 ENCOUNTER — OFFICE VISIT (OUTPATIENT)
Dept: FAMILY MEDICINE CLINIC | Age: 49
End: 2019-10-04
Payer: COMMERCIAL

## 2019-10-04 VITALS
DIASTOLIC BLOOD PRESSURE: 66 MMHG | RESPIRATION RATE: 16 BRPM | WEIGHT: 137 LBS | BODY MASS INDEX: 25.21 KG/M2 | SYSTOLIC BLOOD PRESSURE: 114 MMHG | HEIGHT: 62 IN | TEMPERATURE: 98.4 F | OXYGEN SATURATION: 98 % | HEART RATE: 72 BPM

## 2019-10-04 DIAGNOSIS — M05.79 RHEUMATOID ARTHRITIS INVOLVING MULTIPLE SITES WITH POSITIVE RHEUMATOID FACTOR (HCC): ICD-10-CM

## 2019-10-04 DIAGNOSIS — F41.9 ANXIETY: ICD-10-CM

## 2019-10-04 PROCEDURE — 99213 OFFICE O/P EST LOW 20 MIN: CPT | Performed by: NURSE PRACTITIONER

## 2019-10-04 RX ORDER — HYDROCODONE BITARTRATE AND ACETAMINOPHEN 5; 325 MG/1; MG/1
1 TABLET ORAL EVERY 8 HOURS PRN
Qty: 90 TABLET | Refills: 0 | Status: SHIPPED
Start: 2019-10-04 | End: 2020-03-04 | Stop reason: SDUPTHER

## 2019-10-04 RX ORDER — HYDROCODONE BITARTRATE AND ACETAMINOPHEN 5; 325 MG/1; MG/1
1 TABLET ORAL EVERY 8 HOURS PRN
COMMUNITY
End: 2019-10-04

## 2019-10-04 ASSESSMENT — ENCOUNTER SYMPTOMS
SHORTNESS OF BREATH: 0
DIARRHEA: 0
COUGH: 0
CONSTIPATION: 0
VOMITING: 0
NAUSEA: 0
WHEEZING: 0

## 2019-10-29 RX ORDER — SODIUM CHLORIDE 0.9 % (FLUSH) 0.9 %
10 SYRINGE (ML) INJECTION PRN
Status: CANCELLED | OUTPATIENT
Start: 2019-10-29

## 2019-10-29 RX ORDER — HEPARIN SODIUM (PORCINE) LOCK FLUSH IV SOLN 100 UNIT/ML 100 UNIT/ML
500 SOLUTION INTRAVENOUS PRN
Status: CANCELLED | OUTPATIENT
Start: 2019-10-29

## 2019-10-29 RX ORDER — SODIUM CHLORIDE 0.9 % (FLUSH) 0.9 %
20 SYRINGE (ML) INJECTION PRN
Status: CANCELLED | OUTPATIENT
Start: 2019-10-29

## 2020-03-04 ENCOUNTER — OFFICE VISIT (OUTPATIENT)
Dept: FAMILY MEDICINE CLINIC | Age: 50
End: 2020-03-04
Payer: COMMERCIAL

## 2020-03-04 VITALS
HEIGHT: 62 IN | RESPIRATION RATE: 16 BRPM | HEART RATE: 74 BPM | WEIGHT: 142.4 LBS | TEMPERATURE: 98.3 F | SYSTOLIC BLOOD PRESSURE: 124 MMHG | DIASTOLIC BLOOD PRESSURE: 78 MMHG | BODY MASS INDEX: 26.2 KG/M2

## 2020-03-04 PROCEDURE — 99213 OFFICE O/P EST LOW 20 MIN: CPT | Performed by: NURSE PRACTITIONER

## 2020-03-04 RX ORDER — HYDROCODONE BITARTRATE AND ACETAMINOPHEN 5; 325 MG/1; MG/1
1 TABLET ORAL EVERY 8 HOURS PRN
Qty: 90 TABLET | Refills: 0 | Status: SHIPPED
Start: 2020-03-04 | End: 2020-07-10 | Stop reason: SDUPTHER

## 2020-03-04 ASSESSMENT — ENCOUNTER SYMPTOMS
SHORTNESS OF BREATH: 0
DIARRHEA: 0
WHEEZING: 1
CONSTIPATION: 0
NAUSEA: 0
COUGH: 0
VOMITING: 0

## 2020-03-04 NOTE — PROGRESS NOTES
HPI:  Patient comes intoday for   Chief Complaint   Patient presents with    Arthritis     pt has rheumatoid arthritis all over. pain is 4/10. pt needs refill of meds   . Complains of generalized joint pain due to arthritis. Developed RA post chemo. Only takes norco prn    Prior to Visit Medications    Medication Sig Taking? Authorizing Provider   HYDROcodone-acetaminophen (NORCO) 5-325 MG per tablet Take 1 tablet by mouth every 8 hours as needed for Pain for up to 30 days. Yes ALLEN Henosn CNP   sertraline (ZOLOFT) 50 MG tablet Take 1 tablet by mouth daily Yes ALLEN Fernandes CNP   budesonide-formoterol (SYMBICORT) 160-4.5 MCG/ACT AERO Inhale 2 puffs into the lungs 2 times daily Yes ALLEN Fernandes CNP   sulfaSALAzine (AZULFIDINE) 500 MG tablet Take 1,000 mg by mouth 2 times daily Yes Historical Provider, MD   Ascorbic Acid (VITAMIN C PO) Take 1 tablet by mouth daily Yes Historical Provider, MD   albuterol sulfate  (90 Base) MCG/ACT inhaler Inhale 2 puffs into the lungs every 6 hours as needed for Wheezing Yes ALLEN Kaba CNP   alendronate (FOSAMAX) 70 MG tablet Take 70 mg by mouth once a week Monday Yes Historical Provider, MD   hydroxychloroquine (PLAQUENIL) 200 MG tablet Take 200 mg by mouth daily Yes Historical Provider, MD         No Known Allergies      Review of Systems  Review of Systems   Constitutional: Negative for activity change, appetite change and unexpected weight change. Respiratory: Positive for wheezing. Negative for cough and shortness of breath. Cardiovascular: Negative for chest pain and palpitations. Gastrointestinal: Negative for constipation, diarrhea, nausea and vomiting. Hemorrhoids   Musculoskeletal: Positive for arthralgias and joint swelling. Neurological: Negative for weakness, light-headedness and headaches.          VS:  /78   Pulse 74   Temp 98.3 °F (36.8 °C) (Oral)   Resp 16   Ht 5' 2\" (1.575 m)   Wt 142

## 2020-03-31 ENCOUNTER — TELEPHONE (OUTPATIENT)
Dept: ADMINISTRATIVE | Age: 50
End: 2020-03-31

## 2020-03-31 ENCOUNTER — OFFICE VISIT (OUTPATIENT)
Dept: FAMILY MEDICINE CLINIC | Age: 50
End: 2020-03-31
Payer: COMMERCIAL

## 2020-03-31 VITALS
RESPIRATION RATE: 18 BRPM | BODY MASS INDEX: 26.5 KG/M2 | TEMPERATURE: 97.4 F | WEIGHT: 144 LBS | OXYGEN SATURATION: 96 % | DIASTOLIC BLOOD PRESSURE: 71 MMHG | HEIGHT: 62 IN | HEART RATE: 98 BPM | SYSTOLIC BLOOD PRESSURE: 115 MMHG

## 2020-03-31 PROCEDURE — 99213 OFFICE O/P EST LOW 20 MIN: CPT | Performed by: FAMILY MEDICINE

## 2020-03-31 PROCEDURE — 96372 THER/PROPH/DIAG INJ SC/IM: CPT | Performed by: FAMILY MEDICINE

## 2020-03-31 RX ORDER — PREDNISONE 5 MG/1
TABLET ORAL
Qty: 21 EACH | Refills: 0 | Status: SHIPPED
Start: 2020-03-31 | End: 2020-06-26

## 2020-03-31 RX ORDER — METHYLPREDNISOLONE ACETATE 40 MG/ML
40 INJECTION, SUSPENSION INTRA-ARTICULAR; INTRALESIONAL; INTRAMUSCULAR; SOFT TISSUE ONCE
Status: COMPLETED | OUTPATIENT
Start: 2020-03-31 | End: 2020-03-31

## 2020-03-31 RX ADMIN — METHYLPREDNISOLONE ACETATE 40 MG: 40 INJECTION, SUSPENSION INTRA-ARTICULAR; INTRALESIONAL; INTRAMUSCULAR; SOFT TISSUE at 12:10

## 2020-03-31 ASSESSMENT — ENCOUNTER SYMPTOMS
BACK PAIN: 0
RHINORRHEA: 0
ABDOMINAL DISTENTION: 0
ABDOMINAL PAIN: 0

## 2020-03-31 NOTE — PROGRESS NOTES
HPI:  Patient comes in today for   Chief Complaint   Patient presents with    Rash     Posion ivy x3days, on chest,legs, arms and face   . Review of Systems  Review of Systems   Constitutional: Negative for activity change and appetite change. HENT: Negative for congestion, mouth sores and rhinorrhea. Cardiovascular: Negative for chest pain and leg swelling. Gastrointestinal: Negative for abdominal distention and abdominal pain. Musculoskeletal: Negative for arthralgias and back pain. Skin:        Marked itchiness         PE:  VS:  /71   Pulse 98   Temp 97.4 °F (36.3 °C) (Oral)   Resp 18   Ht 5' 2\" (1.575 m)   Wt 144 lb (65.3 kg)   LMP  (LMP Unknown)   SpO2 96%   BMI 26.34 kg/m²   Physical Exam  HENT:      Head: Normocephalic. Mouth/Throat:      Mouth: Mucous membranes are moist.   Eyes:      Pupils: Pupils are equal, round, and reactive to light. Cardiovascular:      Rate and Rhythm: Normal rate. Pulmonary:      Effort: Pulmonary effort is normal.   Skin:     Comments: Pruritis     Neurological:      General: No focal deficit present. Mental Status: She is alert. Psychiatric:         Mood and Affect: Mood normal.          ASSESSMENT/PLAN:    1. Poison ivy dermatitis  - methylPREDNISolone acetate (DEPO-MEDROL) injection 40 mg  - predniSONE 5 MG (21) TBPK; Take 6 pills on day 1, 5 pills on day 2,4 pills on day 3, 3 pills on day 4, 2 pills on day 5, 1 pill on day 6. Dispense: 21 each; Refill: OBINNA Mason

## 2020-05-22 RX ORDER — SULFASALAZINE 500 MG/1
1000 TABLET ORAL 2 TIMES DAILY
Qty: 120 TABLET | Refills: 0 | Status: SHIPPED
Start: 2020-05-22 | End: 2022-04-04 | Stop reason: CLARIF

## 2020-06-26 ENCOUNTER — HOSPITAL ENCOUNTER (EMERGENCY)
Age: 50
Discharge: HOME | End: 2020-06-26
Attending: EMERGENCY MEDICINE
Payer: COMMERCIAL

## 2020-06-26 ENCOUNTER — APPOINTMENT (OUTPATIENT)
Dept: GENERAL RADIOLOGY | Age: 50
End: 2020-06-26
Payer: COMMERCIAL

## 2020-06-26 VITALS
DIASTOLIC BLOOD PRESSURE: 81 MMHG | HEIGHT: 62 IN | BODY MASS INDEX: 25.76 KG/M2 | TEMPERATURE: 99.4 F | WEIGHT: 140 LBS | RESPIRATION RATE: 18 BRPM | SYSTOLIC BLOOD PRESSURE: 139 MMHG | HEART RATE: 82 BPM | OXYGEN SATURATION: 96 %

## 2020-06-26 LAB
ANION GAP SERPL CALCULATED.3IONS-SCNC: 12 MMOL/L (ref 7–16)
BUN BLDV-MCNC: 11 MG/DL (ref 6–20)
CALCIUM SERPL-MCNC: 9 MG/DL (ref 8.6–10.2)
CHLORIDE BLD-SCNC: 104 MMOL/L (ref 98–107)
CO2: 24 MMOL/L (ref 22–29)
CREAT SERPL-MCNC: 0.7 MG/DL (ref 0.5–1)
GFR AFRICAN AMERICAN: >60
GFR NON-AFRICAN AMERICAN: >60 ML/MIN/1.73
GLUCOSE BLD-MCNC: 101 MG/DL (ref 74–99)
POTASSIUM SERPL-SCNC: 3.7 MMOL/L (ref 3.5–5)
SODIUM BLD-SCNC: 140 MMOL/L (ref 132–146)
URIC ACID, SERUM: 5.2 MG/DL (ref 2.4–5.7)

## 2020-06-26 PROCEDURE — 80048 BASIC METABOLIC PNL TOTAL CA: CPT

## 2020-06-26 PROCEDURE — 6370000000 HC RX 637 (ALT 250 FOR IP): Performed by: EMERGENCY MEDICINE

## 2020-06-26 PROCEDURE — 73130 X-RAY EXAM OF HAND: CPT

## 2020-06-26 PROCEDURE — 84550 ASSAY OF BLOOD/URIC ACID: CPT

## 2020-06-26 PROCEDURE — 99283 EMERGENCY DEPT VISIT LOW MDM: CPT

## 2020-06-26 RX ORDER — PREDNISONE 20 MG/1
40 TABLET ORAL DAILY
Qty: 10 TABLET | Refills: 0 | Status: SHIPPED | OUTPATIENT
Start: 2020-06-26 | End: 2020-07-01

## 2020-06-26 RX ORDER — HYDROCODONE BITARTRATE AND ACETAMINOPHEN 5; 325 MG/1; MG/1
1 TABLET ORAL EVERY 6 HOURS PRN
Qty: 12 TABLET | Refills: 0 | Status: SHIPPED | OUTPATIENT
Start: 2020-06-26 | End: 2020-06-29

## 2020-06-26 RX ORDER — PREDNISONE 20 MG/1
60 TABLET ORAL ONCE
Status: COMPLETED | OUTPATIENT
Start: 2020-06-26 | End: 2020-06-26

## 2020-06-26 RX ORDER — HYDROCODONE BITARTRATE AND ACETAMINOPHEN 5; 325 MG/1; MG/1
1 TABLET ORAL ONCE
Status: COMPLETED | OUTPATIENT
Start: 2020-06-26 | End: 2020-06-26

## 2020-06-26 RX ADMIN — HYDROCODONE BITARTRATE AND ACETAMINOPHEN 1 TABLET: 5; 325 TABLET ORAL at 05:11

## 2020-06-26 RX ADMIN — PREDNISONE 60 MG: 20 TABLET ORAL at 05:11

## 2020-06-26 ASSESSMENT — ENCOUNTER SYMPTOMS
WHEEZING: 0
SINUS PRESSURE: 0
DIARRHEA: 0
SHORTNESS OF BREATH: 0
EYE REDNESS: 0
COUGH: 0
BACK PAIN: 0
EYE DISCHARGE: 0
SORE THROAT: 0
EYE PAIN: 0
ABDOMINAL DISTENTION: 0
NAUSEA: 0
VOMITING: 0

## 2020-06-26 ASSESSMENT — PAIN DESCRIPTION - PROGRESSION: CLINICAL_PROGRESSION: GRADUALLY WORSENING

## 2020-06-26 ASSESSMENT — PAIN DESCRIPTION - PAIN TYPE: TYPE: ACUTE PAIN

## 2020-06-26 ASSESSMENT — PAIN DESCRIPTION - DESCRIPTORS: DESCRIPTORS: THROBBING

## 2020-06-26 ASSESSMENT — PAIN SCALES - GENERAL
PAINLEVEL_OUTOF10: 8
PAINLEVEL_OUTOF10: 8

## 2020-06-26 ASSESSMENT — PAIN DESCRIPTION - FREQUENCY: FREQUENCY: CONTINUOUS

## 2020-06-26 ASSESSMENT — PAIN DESCRIPTION - ORIENTATION: ORIENTATION: LOWER;LEFT

## 2020-06-26 ASSESSMENT — PAIN DESCRIPTION - ONSET: ONSET: GRADUAL

## 2020-06-26 ASSESSMENT — PAIN DESCRIPTION - LOCATION: LOCATION: ARM

## 2020-06-26 NOTE — ED NOTES
Attempted calling xray again to see if they were ready for the patient, still no answer at this time.       Merna Stewart RN  06/26/20 0118

## 2020-06-26 NOTE — ED PROVIDER NOTES
well as the importance of follow-up. New Prescriptions    HYDROCODONE-ACETAMINOPHEN (NORCO) 5-325 MG PER TABLET    Take 1 tablet by mouth every 6 hours as needed for Pain for up to 3 days. Intended supply: 3 days. Take lowest dose possible to manage pain    PREDNISONE (DELTASONE) 20 MG TABLET    Take 2 tablets by mouth daily for 5 days       Diagnosis:  1. Left hand pain        Disposition:  Patient's disposition: Discharge to home  Patient's condition is stable.              [address] [facility],   06/26/20 3407

## 2020-07-10 ENCOUNTER — OFFICE VISIT (OUTPATIENT)
Dept: FAMILY MEDICINE CLINIC | Age: 50
End: 2020-07-10
Payer: COMMERCIAL

## 2020-07-10 VITALS
TEMPERATURE: 98.7 F | SYSTOLIC BLOOD PRESSURE: 118 MMHG | HEIGHT: 62 IN | RESPIRATION RATE: 16 BRPM | WEIGHT: 154 LBS | HEART RATE: 107 BPM | BODY MASS INDEX: 28.34 KG/M2 | OXYGEN SATURATION: 96 % | DIASTOLIC BLOOD PRESSURE: 62 MMHG

## 2020-07-10 PROBLEM — R91.8 LUNG MASS: Status: RESOLVED | Noted: 2019-04-10 | Resolved: 2020-07-10

## 2020-07-10 PROBLEM — J15.9 COMMUNITY ACQUIRED BACTERIAL PNEUMONIA: Status: RESOLVED | Noted: 2019-04-10 | Resolved: 2020-07-10

## 2020-07-10 PROCEDURE — 99213 OFFICE O/P EST LOW 20 MIN: CPT | Performed by: NURSE PRACTITIONER

## 2020-07-10 RX ORDER — BUDESONIDE AND FORMOTEROL FUMARATE DIHYDRATE 160; 4.5 UG/1; UG/1
2 AEROSOL RESPIRATORY (INHALATION) 2 TIMES DAILY
Qty: 10.2 G | Refills: 2 | Status: SHIPPED
Start: 2020-07-10 | End: 2022-04-04 | Stop reason: CLARIF

## 2020-07-10 RX ORDER — ALBUTEROL SULFATE 90 UG/1
2 AEROSOL, METERED RESPIRATORY (INHALATION) EVERY 6 HOURS PRN
Qty: 1 INHALER | Refills: 5 | Status: SHIPPED | OUTPATIENT
Start: 2020-07-10

## 2020-07-10 RX ORDER — HYDROCODONE BITARTRATE AND ACETAMINOPHEN 5; 325 MG/1; MG/1
1 TABLET ORAL EVERY 8 HOURS PRN
Qty: 90 TABLET | Refills: 0 | Status: SHIPPED
Start: 2020-07-10 | End: 2020-10-23 | Stop reason: SDUPTHER

## 2020-07-10 ASSESSMENT — ENCOUNTER SYMPTOMS
WHEEZING: 0
CONSTIPATION: 0
DIARRHEA: 0
SHORTNESS OF BREATH: 1
BACK PAIN: 1
COUGH: 0
VOMITING: 0
NAUSEA: 0

## 2020-10-23 ENCOUNTER — OFFICE VISIT (OUTPATIENT)
Dept: FAMILY MEDICINE CLINIC | Age: 50
End: 2020-10-23
Payer: COMMERCIAL

## 2020-10-23 VITALS
HEART RATE: 110 BPM | DIASTOLIC BLOOD PRESSURE: 72 MMHG | RESPIRATION RATE: 14 BRPM | SYSTOLIC BLOOD PRESSURE: 124 MMHG | TEMPERATURE: 97 F | HEIGHT: 62 IN | BODY MASS INDEX: 28.52 KG/M2 | WEIGHT: 155 LBS | OXYGEN SATURATION: 96 %

## 2020-10-23 PROCEDURE — 99213 OFFICE O/P EST LOW 20 MIN: CPT | Performed by: NURSE PRACTITIONER

## 2020-10-23 PROCEDURE — 90471 IMMUNIZATION ADMIN: CPT | Performed by: NURSE PRACTITIONER

## 2020-10-23 PROCEDURE — 90688 IIV4 VACCINE SPLT 0.5 ML IM: CPT | Performed by: NURSE PRACTITIONER

## 2020-10-23 RX ORDER — HYDROCODONE BITARTRATE AND ACETAMINOPHEN 5; 325 MG/1; MG/1
1 TABLET ORAL EVERY 8 HOURS PRN
Qty: 90 TABLET | Refills: 0 | Status: SHIPPED
Start: 2020-10-23 | End: 2021-02-08 | Stop reason: SDUPTHER

## 2020-10-23 ASSESSMENT — ENCOUNTER SYMPTOMS
VOMITING: 0
NAUSEA: 0
COUGH: 1
SHORTNESS OF BREATH: 1
CONSTIPATION: 0
DIARRHEA: 0
WHEEZING: 1

## 2020-10-23 NOTE — PROGRESS NOTES
HPI:  Patient comes in today for   Chief Complaint   Patient presents with    Pain     refill of norco. last dose was a week and a half ago. pain today is ok    Hip Injury     right hip out of place x2 weeks. her chiropractor did an xray and it was \"kinked\".  Health Maintenance     flu vaccine offered, will take it. .    Complains of generalized joint pain due to arthritis. Developed RA post chemo. Only takes norco prn. No side effects. Patient was smoking recently but states that she quit. She is working and has remained healthy. Was seen for chiropractor for right hip pain, did XR. Continuing f/u for adjustment. Prior to Visit Medications    Medication Sig Taking? Authorizing Provider   HYDROcodone-acetaminophen (NORCO) 5-325 MG per tablet Take 1 tablet by mouth every 8 hours as needed for Pain for up to 30 days. Yes ALLEN Fernandes CNP   sertraline (ZOLOFT) 50 MG tablet Take 1 tablet by mouth daily Yes ALLEN Fernandes CNP   budesonide-formoterol (SYMBICORT) 160-4.5 MCG/ACT AERO Inhale 2 puffs into the lungs 2 times daily Yes ALLEN Fernandes CNP   albuterol sulfate  (90 Base) MCG/ACT inhaler Inhale 2 puffs into the lungs every 6 hours as needed for Wheezing Yes ALLEN Cummings CNP   sulfaSALAzine (AZULFIDINE) 500 MG tablet Take 2 tablets by mouth 2 times daily Yes ALLEN Fraser CNP   hydroxychloroquine (PLAQUENIL) 200 MG tablet Take 200 mg by mouth daily Yes Historical Provider, MD         No Known Allergies      Review of Systems  Review of Systems   Constitutional: Negative for chills, diaphoresis and fever. Respiratory: Positive for cough, shortness of breath and wheezing. Controlled with inhalers   Cardiovascular: Negative for chest pain and palpitations. Gastrointestinal: Negative for constipation, diarrhea, nausea and vomiting. Genitourinary: Negative for difficulty urinating.    Musculoskeletal: Positive for arthralgias (multiple joints) and joint swelling (bilateral hands). Neurological: Negative for dizziness, weakness and headaches. VS:  /72   Pulse 110   Temp 97 °F (36.1 °C) (Temporal)   Resp 14   Ht 5' 2\" (1.575 m)   Wt 155 lb (70.3 kg)   LMP  (LMP Unknown)   SpO2 96%   BMI 28.35 kg/m²     Physical Exam  Physical Exam  Constitutional:       Appearance: She is well-developed. HENT:      Head: Normocephalic and atraumatic. Neck:      Thyroid: No thyromegaly. Trachea: No tracheal deviation. Cardiovascular:      Rate and Rhythm: Normal rate and regular rhythm. Heart sounds: No murmur. Pulmonary:      Effort: Pulmonary effort is normal.   Abdominal:      General: Bowel sounds are normal.      Palpations: Abdomen is soft. Tenderness: There is no abdominal tenderness. Musculoskeletal: Normal range of motion. General: Tenderness (to multiple joints) present. Lymphadenopathy:      Cervical: No cervical adenopathy. Skin:     General: Skin is warm and dry. Neurological:      Mental Status: She is alert and oriented to person, place, and time. Psychiatric:         Behavior: Behavior normal.           Assessment/Plan:  Sharyle Mais was seen today for pain, hip injury and health maintenance. Diagnoses and all orders for this visit:    Rheumatoid arthritis involving multiple sites with positive rheumatoid factor (HCC)  -     HYDROcodone-acetaminophen (NORCO) 5-325 MG per tablet; Take 1 tablet by mouth every 8 hours as needed for Pain for up to 30 days.  - Reviewed side effects of medication and patient verbalizes understanding.   - The current medical regimen is effective;  continue present plan and medications. Encounter for screening mammogram for malignant neoplasm of breast  -     MARCELO DIGITAL SCREEN W OR WO CAD BILATERAL;  Future    Flu vaccine need  -     INFLUENZA, QUADV, 6-35 MO, IM, MDV, 0.25ML ([facility] [place])    -  Next appointment schedule as physical for yearly lab work    Controlled Substance Monitoring:    Acute and Chronic Pain Monitoring:   RX Monitoring 10/23/2020   Attestation -   Periodic Controlled Substance Monitoring No signs of potential drug abuse or diversion identified.;Obtaining appropriate analgesic effect of treatment.    Chronic Pain > 80 MEDD -       Greater than 15 minutes was spent with patient and more than 50% of the time was spent face to face counseling and educating regarding diagnoses

## 2020-10-30 ENCOUNTER — OFFICE VISIT (OUTPATIENT)
Dept: FAMILY MEDICINE CLINIC | Age: 50
End: 2020-10-30
Payer: COMMERCIAL

## 2020-10-30 VITALS
DIASTOLIC BLOOD PRESSURE: 62 MMHG | OXYGEN SATURATION: 98 % | HEIGHT: 62 IN | TEMPERATURE: 96.9 F | RESPIRATION RATE: 20 BRPM | HEART RATE: 104 BPM | BODY MASS INDEX: 28.67 KG/M2 | SYSTOLIC BLOOD PRESSURE: 138 MMHG | WEIGHT: 155.8 LBS

## 2020-10-30 PROCEDURE — 96372 THER/PROPH/DIAG INJ SC/IM: CPT | Performed by: NURSE PRACTITIONER

## 2020-10-30 PROCEDURE — 99213 OFFICE O/P EST LOW 20 MIN: CPT | Performed by: NURSE PRACTITIONER

## 2020-10-30 RX ORDER — METHYLPREDNISOLONE ACETATE 80 MG/ML
80 INJECTION, SUSPENSION INTRA-ARTICULAR; INTRALESIONAL; INTRAMUSCULAR; SOFT TISSUE ONCE
Status: COMPLETED | OUTPATIENT
Start: 2020-10-30 | End: 2020-10-30

## 2020-10-30 RX ADMIN — METHYLPREDNISOLONE ACETATE 80 MG: 80 INJECTION, SUSPENSION INTRA-ARTICULAR; INTRALESIONAL; INTRAMUSCULAR; SOFT TISSUE at 11:33

## 2020-10-30 ASSESSMENT — ENCOUNTER SYMPTOMS
BACK PAIN: 1
WHEEZING: 0
NAUSEA: 0
CONSTIPATION: 0
COUGH: 0
SHORTNESS OF BREATH: 0
VOMITING: 0
DIARRHEA: 0

## 2020-10-30 NOTE — PROGRESS NOTES
HPI:  Patient comes in today for   Chief Complaint   Patient presents with    Hip Pain     xrays at [facility] on [address] [employer]   . Was adjusted yesterday at chiropractor. Had XRs there 3 weeks ago. Dr. [place]dez [place] on [facility] [place]. Still having pain after adjustments. Pain is on right greater trochanter and radiates into right groin and right low back and right knee. Prior to Visit Medications    Medication Sig Taking? Authorizing Provider   HYDROcodone-acetaminophen (NORCO) 5-325 MG per tablet Take 1 tablet by mouth every 8 hours as needed for Pain for up to 30 days. Yes ALLEN Fernandes CNP   sertraline (ZOLOFT) 50 MG tablet Take 1 tablet by mouth daily Yes ALLEN Fernandes CNP   budesonide-formoterol (SYMBICORT) 160-4.5 MCG/ACT AERO Inhale 2 puffs into the lungs 2 times daily Yes ALLEN Fernandes CNP   albuterol sulfate  (90 Base) MCG/ACT inhaler Inhale 2 puffs into the lungs every 6 hours as needed for Wheezing Yes ALLEN Mcpherson CNP   sulfaSALAzine (AZULFIDINE) 500 MG tablet Take 2 tablets by mouth 2 times daily Yes ALLEN Servin CNP   hydroxychloroquine (PLAQUENIL) 200 MG tablet Take 200 mg by mouth daily Yes Historical Provider, MD         No Known Allergies      Review of Systems  Review of Systems   Constitutional: Negative for activity change, appetite change, chills, diaphoresis and fever. Respiratory: Negative for cough, shortness of breath and wheezing. Cardiovascular: Negative for chest pain and palpitations. Gastrointestinal: Negative for constipation, diarrhea, nausea and vomiting. Genitourinary: Negative for difficulty urinating. Musculoskeletal: Positive for back pain (low) and myalgias. Neurological: Negative for dizziness, weakness and headaches.          VS:  /62   Pulse 104   Temp 96.9 °F (36.1 °C) (Temporal)   Resp 20   Ht 5' 2\" (1.575 m)   Wt 155 lb 12.8 oz (70.7 kg)   LMP  (LMP Unknown)   SpO2 98%   BMI 28.50 kg/m² Physical Exam  Physical Exam  Constitutional:       Appearance: She is well-developed. HENT:      Head: Normocephalic and atraumatic. Neck:      Thyroid: No thyromegaly. Trachea: No tracheal deviation. Cardiovascular:      Rate and Rhythm: Normal rate and regular rhythm. Heart sounds: No murmur. Pulmonary:      Effort: Pulmonary effort is normal.      Breath sounds: Normal breath sounds. Abdominal:      General: Bowel sounds are normal.      Palpations: Abdomen is soft. Tenderness: There is no abdominal tenderness. Musculoskeletal: Normal range of motion. General: Tenderness present. Comments: To right SI joint. ROM limited due to pain. Positive SLR right. Upper extremities equal and strong, right lower extremity 3/5, left lower extremity 5/5. Lymphadenopathy:      Cervical: No cervical adenopathy. Skin:     General: Skin is warm and dry. Neurological:      Mental Status: She is alert and oriented to person, place, and time. Psychiatric:         Behavior: Behavior normal.           Assessment/Plan:  Truong Nguyen was seen today for hip pain. Diagnoses and all orders for this visit:    Acute right-sided low back pain with right-sided sciatica      Procedure:  Intra-Articular Injection right SI    The patient was counseled on the options for treating the affected SI pain. These include but were not limited to trail of oral anti-inflammatories, oral steroids, physical therapy referral, or ortho consultation. The patient is electing injection. The risks of the injection were explained, including but not limited to infection, bleeding, bruising, tendon injury, skin contour deformity and soft tissue/fat necrosis. The patient gave verbal permission to proceed. The patient was placed in a seated position. The skin of theright SI area was prepped with Betadine swabs. It was allowed to dry.     An injection of 80 mg of DepoMedrol and 1 cc of 1% lidocaine without epinephrine

## 2020-11-09 ENCOUNTER — TELEPHONE (OUTPATIENT)
Dept: FAMILY MEDICINE CLINIC | Age: 50
End: 2020-11-09

## 2020-12-01 ENCOUNTER — HOSPITAL ENCOUNTER (OUTPATIENT)
Dept: MRI IMAGING | Age: 50
Discharge: HOME | End: 2020-12-03
Payer: COMMERCIAL

## 2020-12-01 PROCEDURE — 72148 MRI LUMBAR SPINE W/O DYE: CPT

## 2021-01-20 ENCOUNTER — TELEPHONE (OUTPATIENT)
Dept: ADMINISTRATIVE | Age: 51
End: 2021-01-20

## 2021-01-20 NOTE — TELEPHONE ENCOUNTER
Patient called at 930am this morning to cancel her 330pm appt this afternoon. Stated she is unable to make it and declined to reschedule, said she will call back.

## 2021-02-08 ENCOUNTER — OFFICE VISIT (OUTPATIENT)
Dept: FAMILY MEDICINE CLINIC | Age: 51
End: 2021-02-08
Payer: COMMERCIAL

## 2021-02-08 VITALS
TEMPERATURE: 96.2 F | OXYGEN SATURATION: 98 % | HEIGHT: 62 IN | DIASTOLIC BLOOD PRESSURE: 74 MMHG | BODY MASS INDEX: 28.16 KG/M2 | WEIGHT: 153 LBS | HEART RATE: 70 BPM | SYSTOLIC BLOOD PRESSURE: 116 MMHG | RESPIRATION RATE: 14 BRPM

## 2021-02-08 DIAGNOSIS — M05.79 RHEUMATOID ARTHRITIS INVOLVING MULTIPLE SITES WITH POSITIVE RHEUMATOID FACTOR (HCC): ICD-10-CM

## 2021-02-08 DIAGNOSIS — F41.9 ANXIETY: ICD-10-CM

## 2021-02-08 PROCEDURE — 99213 OFFICE O/P EST LOW 20 MIN: CPT | Performed by: NURSE PRACTITIONER

## 2021-02-08 RX ORDER — HYDROCODONE BITARTRATE AND ACETAMINOPHEN 5; 325 MG/1; MG/1
1 TABLET ORAL EVERY 8 HOURS PRN
Qty: 90 TABLET | Refills: 0 | Status: SHIPPED
Start: 2021-02-08 | End: 2021-04-16 | Stop reason: SDUPTHER

## 2021-02-08 RX ORDER — METHYLPREDNISOLONE 4 MG/1
TABLET ORAL
Qty: 1 KIT | Refills: 0 | Status: SHIPPED
Start: 2021-02-08 | End: 2021-04-16 | Stop reason: ALTCHOICE

## 2021-02-08 ASSESSMENT — ENCOUNTER SYMPTOMS
CONSTIPATION: 0
NAUSEA: 0
DIARRHEA: 0
VOMITING: 0
WHEEZING: 0
COUGH: 0
SHORTNESS OF BREATH: 0

## 2021-02-08 NOTE — PROGRESS NOTES
Alida Laboy (:  1970) is a 53 y.o. female,Established patient, here for evaluation of the following chief complaint(s):  Pain (refill of norco. last dose was 3 months ago, was unable to get into office d/t work schedule. \"eating aleve like it's candy\". Pain today 7/10 \"tolerable\") and Hand Pain (hand swelling, sometimes both hands. today is just the right hand. )      ASSESSMENT/PLAN:  1. Rheumatoid arthritis involving multiple sites with positive rheumatoid factor (HCC)  -     HYDROcodone-acetaminophen (NORCO) 5-325 MG per tablet; Take 1 tablet by mouth every 8 hours as needed for Pain for up to 30 days. , Disp-90 tablet, R-0Normal  -     methylPREDNISolone (MEDROL, RENE,) 4 MG tablet; Take by mouth as directed, Disp-1 kit, R-0Normal  -Contact office if symptoms do not improve as expected or worsen.  -norco prn only    2. Anxiety  -     sertraline (ZOLOFT) 50 MG tablet; Take 1 tablet by mouth daily, Disp-30 tablet, R-5Normal  -The current medical regimen is effective;  continue present plan and medications. Controlled Substance Monitoring:    Acute and Chronic Pain Monitoring:   RX Monitoring 2021   Attestation -   Periodic Controlled Substance Monitoring No signs of potential drug abuse or diversion identified. Chronic Pain > 80 MEDD -           Return in about 1 month (around 3/8/2021), or if symptoms worsen or fail to improve. SUBJECTIVE/OBJECTIVE:  Complains of generalized joint pain-mainly hands and hips. She is having pain and stiffness in her joints. 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 She was having low back pain but went to chiropractor and had dry needling with good results. Complains of anxiety. States normally controlled but there are extra stressors at home      Review of Systems   Constitutional: Negative for activity change, appetite change, fatigue and unexpected weight change. Respiratory: Negative for cough, shortness of breath and wheezing.

## 2021-04-12 ENCOUNTER — OFFICE VISIT (OUTPATIENT)
Dept: FAMILY MEDICINE CLINIC | Age: 51
End: 2021-04-12
Payer: COMMERCIAL

## 2021-04-12 ENCOUNTER — TELEPHONE (OUTPATIENT)
Dept: FAMILY MEDICINE CLINIC | Age: 51
End: 2021-04-12

## 2021-04-12 ENCOUNTER — HOSPITAL ENCOUNTER (OUTPATIENT)
Dept: GENERAL RADIOLOGY | Age: 51
Discharge: HOME | End: 2021-04-14
Payer: COMMERCIAL

## 2021-04-12 ENCOUNTER — HOSPITAL ENCOUNTER (OUTPATIENT)
Age: 51
Discharge: HOME | End: 2021-04-14
Payer: COMMERCIAL

## 2021-04-12 VITALS
BODY MASS INDEX: 26.13 KG/M2 | HEIGHT: 62 IN | DIASTOLIC BLOOD PRESSURE: 80 MMHG | SYSTOLIC BLOOD PRESSURE: 136 MMHG | TEMPERATURE: 96.7 F | HEART RATE: 104 BPM | WEIGHT: 142 LBS | OXYGEN SATURATION: 99 % | RESPIRATION RATE: 17 BRPM

## 2021-04-12 DIAGNOSIS — R07.81 RIB PAIN ON RIGHT SIDE: Primary | ICD-10-CM

## 2021-04-12 DIAGNOSIS — R07.81 RIB PAIN ON RIGHT SIDE: ICD-10-CM

## 2021-04-12 PROCEDURE — 71101 X-RAY EXAM UNILAT RIBS/CHEST: CPT

## 2021-04-12 PROCEDURE — 99213 OFFICE O/P EST LOW 20 MIN: CPT | Performed by: PHYSICIAN ASSISTANT

## 2021-04-12 PROCEDURE — 96372 THER/PROPH/DIAG INJ SC/IM: CPT | Performed by: PHYSICIAN ASSISTANT

## 2021-04-12 RX ORDER — KETOROLAC TROMETHAMINE 30 MG/ML
60 INJECTION, SOLUTION INTRAMUSCULAR; INTRAVENOUS ONCE
Status: COMPLETED | OUTPATIENT
Start: 2021-04-12 | End: 2021-04-12

## 2021-04-12 RX ADMIN — KETOROLAC TROMETHAMINE 60 MG: 30 INJECTION, SOLUTION INTRAMUSCULAR; INTRAVENOUS at 11:02

## 2021-04-12 SDOH — ECONOMIC STABILITY: FOOD INSECURITY: WITHIN THE PAST 12 MONTHS, YOU WORRIED THAT YOUR FOOD WOULD RUN OUT BEFORE YOU GOT MONEY TO BUY MORE.: NEVER TRUE

## 2021-04-12 SDOH — ECONOMIC STABILITY: INCOME INSECURITY: HOW HARD IS IT FOR YOU TO PAY FOR THE VERY BASICS LIKE FOOD, HOUSING, MEDICAL CARE, AND HEATING?: NOT HARD AT ALL

## 2021-04-12 SDOH — ECONOMIC STABILITY: TRANSPORTATION INSECURITY
IN THE PAST 12 MONTHS, HAS LACK OF TRANSPORTATION KEPT YOU FROM MEETINGS, WORK, OR FROM GETTING THINGS NEEDED FOR DAILY LIVING?: NO
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
## 2021-04-12 NOTE — PATIENT INSTRUCTIONS
Patient Education        Bruised Rib: Care Instructions  Overview     You can get a bruised rib if you fall or get hit, such as in an accident or while playing sports. The medical term for a bruise is \"contusion. \" Small blood vessels get torn and leak blood under the skin. Most people think of a bruise as a black-and-blue area. But bones and muscles can also get bruised. An injury may damage the rib but not cause a bruise that you can see. Sometimes it can be hard to tell if a rib is bruised or broken. The symptoms may be the same. And a broken bone can't always be seen on an X-ray. But the treatment for a bruised rib is often the same as treatment for a broken one. An injury to the ribs can cause pain. The pain may be worse when you breathe deeply, cough, or sneeze. In most cases, a bruised rib will heal on its own. You can take pain medicine while the rib mends. Pain relief allows you to take deep breaths. Follow-up care is a key part of your treatment and safety. Be sure to make and go to all appointments, and call your doctor if you are having problems. It's also a good idea to know your test results and keep a list of the medicines you take. How can you care for yourself at home? · Rest and protect the injured or sore area. Stop, change, or take a break from any activity that causes pain. · Put ice or a cold pack on the area for 10 to 20 minutes at a time. Put a thin cloth between the ice and your skin. · After 2 or 3 days, if your swelling is gone, put a heating pad set on low or a warm cloth on your chest. Some doctors suggest that you go back and forth between hot and cold. Put a thin cloth between the heating pad and your skin. · Ask your doctor if you can take an over-the-counter pain medicine, such as acetaminophen (Tylenol), ibuprofen (Advil, Motrin), or naproxen (Aleve). Be safe with medicines. Read and follow all instructions on the label.   · As your pain gets better, slowly return to your

## 2021-04-16 ENCOUNTER — OFFICE VISIT (OUTPATIENT)
Dept: FAMILY MEDICINE CLINIC | Age: 51
End: 2021-04-16
Payer: COMMERCIAL

## 2021-04-16 VITALS
WEIGHT: 142.2 LBS | SYSTOLIC BLOOD PRESSURE: 132 MMHG | HEART RATE: 112 BPM | DIASTOLIC BLOOD PRESSURE: 80 MMHG | TEMPERATURE: 98 F | HEIGHT: 62 IN | BODY MASS INDEX: 26.17 KG/M2 | OXYGEN SATURATION: 98 %

## 2021-04-16 DIAGNOSIS — M05.79 RHEUMATOID ARTHRITIS INVOLVING MULTIPLE SITES WITH POSITIVE RHEUMATOID FACTOR (HCC): ICD-10-CM

## 2021-04-16 DIAGNOSIS — S22.31XD CLOSED FRACTURE OF ONE RIB OF RIGHT SIDE WITH ROUTINE HEALING, SUBSEQUENT ENCOUNTER: Primary | ICD-10-CM

## 2021-04-16 PROCEDURE — 99213 OFFICE O/P EST LOW 20 MIN: CPT | Performed by: NURSE PRACTITIONER

## 2021-04-16 RX ORDER — HYDROCODONE BITARTRATE AND ACETAMINOPHEN 5; 325 MG/1; MG/1
1 TABLET ORAL EVERY 8 HOURS PRN
Qty: 90 TABLET | Refills: 0 | Status: SHIPPED
Start: 2021-04-16 | End: 2021-05-28 | Stop reason: SDUPTHER

## 2021-04-16 ASSESSMENT — ENCOUNTER SYMPTOMS
NAUSEA: 0
SHORTNESS OF BREATH: 0
WHEEZING: 0
DIARRHEA: 0
VOMITING: 0
CONSTIPATION: 0
COUGH: 0

## 2021-04-16 NOTE — PROGRESS NOTES
Loida Bermudez (:  1970) is a 49 y.o. female,Established patient, here for evaluation of the following chief complaint(s):  Rib Injury (rib injury 4/10/21. Xray done ), Arthritis (chronic pain follow up ), and Medication Refill (  HYDROcodone-acetaminophen (NORCO) 5-325)      ASSESSMENT/PLAN:  1. Closed fracture of one rib of right side with routine healing, subsequent encounter  -use pillow for support when coughing or deep breathing  -use IS    2. Rheumatoid arthritis involving multiple sites with positive rheumatoid factor (HCC)  -     HYDROcodone-acetaminophen (NORCO) 5-325 MG per tablet; Take 1 tablet by mouth every 8 hours as needed for Pain for up to 30 days. , Disp-90 tablet, R-0Normal  -The current medical regimen is effective;  continue present plan and medications. Controlled Substance Monitoring:    Acute and Chronic Pain Monitoring:   RX Monitoring 2021   Attestation -   Periodic Controlled Substance Monitoring No signs of potential drug abuse or diversion identified. Chronic Pain > 80 MEDD -         Return if symptoms worsen or fail to improve. SUBJECTIVE/OBJECTIVE:  [address] [facility]  due to rib pain/injury. X-ray showed right 7th rib fracture. Meseret Ulrich takes edge off of pain. Only taking prn.   complains of chronic joint pain related to RA. She does take norco prn only       Review of Systems   Constitutional: Negative for activity change, appetite change and unexpected weight change. Respiratory: Negative for cough, shortness of breath and wheezing. Right rib pain   Cardiovascular: Negative for chest pain and palpitations. Gastrointestinal: Negative for constipation, diarrhea, nausea and vomiting. Musculoskeletal: Positive for arthralgias, joint swelling and myalgias. Neurological: Negative for weakness, light-headedness and headaches. Physical Exam  Constitutional:       General: She is not in acute distress.      Appearance: She is well-developed. HENT:      Head: Normocephalic and atraumatic. Neck:      Thyroid: No thyromegaly. Trachea: No tracheal deviation. Cardiovascular:      Rate and Rhythm: Normal rate and regular rhythm. Heart sounds: No murmur. Pulmonary:      Effort: Pulmonary effort is normal.      Breath sounds: Normal breath sounds. No wheezing or rales. Chest:      Chest wall: No tenderness. Abdominal:      General: Bowel sounds are normal.      Palpations: Abdomen is soft. Tenderness: There is no abdominal tenderness. Musculoskeletal:         General: Tenderness and deformity (arthritic changes to hands) present. Lymphadenopathy:      Cervical: No cervical adenopathy. Skin:     General: Skin is warm and dry. Neurological:      Mental Status: She is alert and oriented to person, place, and time. Psychiatric:         Behavior: Behavior normal.         [facility] anastasia      An electronic signature was used to authenticate this note.     --Geovanna Whitmore, ALLEN - CNP

## 2021-05-28 ENCOUNTER — TELEMEDICINE (OUTPATIENT)
Dept: FAMILY MEDICINE CLINIC | Age: 51
End: 2021-05-28
Payer: COMMERCIAL

## 2021-05-28 DIAGNOSIS — M05.79 RHEUMATOID ARTHRITIS INVOLVING MULTIPLE SITES WITH POSITIVE RHEUMATOID FACTOR (HCC): ICD-10-CM

## 2021-05-28 PROCEDURE — 99213 OFFICE O/P EST LOW 20 MIN: CPT | Performed by: NURSE PRACTITIONER

## 2021-05-28 RX ORDER — HYDROCODONE BITARTRATE AND ACETAMINOPHEN 5; 325 MG/1; MG/1
1 TABLET ORAL EVERY 8 HOURS PRN
Qty: 90 TABLET | Refills: 0 | Status: SHIPPED
Start: 2021-05-28 | End: 2021-08-13 | Stop reason: SDUPTHER

## 2021-05-28 ASSESSMENT — ENCOUNTER SYMPTOMS
CONSTIPATION: 0
DIARRHEA: 0
VOMITING: 0
SHORTNESS OF BREATH: 0
NAUSEA: 0
BACK PAIN: 1
COUGH: 0
WHEEZING: 0

## 2021-05-28 NOTE — PROGRESS NOTES
Rachelle Tompkins (:  1970) is a 49 y.o. female,Established patient, here for evaluation of the following chief complaint(s): Medication Refill and Back Pain         ASSESSMENT/PLAN:  1. Rheumatoid arthritis involving multiple sites with positive rheumatoid factor (HCC)  -     HYDROcodone-acetaminophen (NORCO) 5-325 MG per tablet; Take 1 tablet by mouth every 8 hours as needed for Pain for up to 30 days. , Disp-90 tablet, R-0Normal  - Reviewed side effects of medication and patient verbalizes understanding.   - The current medical regimen is effective;  continue present plan and medications. - May have video visit for next visit      Controlled Substance Monitoring:    Acute and Chronic Pain Monitoring:   RX Monitoring 2021   Attestation -   Periodic Controlled Substance Monitoring No signs of potential drug abuse or diversion identified.;Obtaining appropriate analgesic effect of treatment. Chronic Pain > 80 MEDD -           Return in about 4 weeks (around 2021), or if symptoms worsen or fail to improve, for med review. SUBJECTIVE/OBJECTIVE:  HPI     Complains of generalized joint pain-mainly hands and hips. She is having more pain and stiffness in her joints. She follows with rheumatology, needs to make an appointment. She feels like her meds are not working to control her arthritis. 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 she has an appointment with specialist in [place] but has not been there yet due to fracturing ribs. Review of Systems   Constitutional: Negative for activity change, appetite change, diaphoresis and fever. Respiratory: Negative for cough, shortness of breath and wheezing. Controlled with inhalers   Cardiovascular: Negative for chest pain and palpitations. Gastrointestinal: Negative for constipation, diarrhea, nausea and vomiting. Genitourinary: Negative for difficulty urinating.    Musculoskeletal: Positive for arthralgias, back pain, joint swelling (hands) and myalgias. Neurological: Negative for dizziness, weakness and headaches. Psychiatric/Behavioral: Negative for sleep disturbance. No flowsheet data found. Physical Exam    [INSTRUCTIONS:  \"[x]\" Indicates a positive item  \"[]\" Indicates a negative item  -- DELETE ALL ITEMS NOT EXAMINED]    Constitutional: [x] Appears well-developed and well-nourished [x] No apparent distress      [] Abnormal -     Mental status: [x] Alert and awake  [x] Oriented to person/place/time [x] Able to follow commands    [] Abnormal -     Eyes:   EOM    [x]  Normal    [] Abnormal -   Sclera  [x]  Normal    [] Abnormal -          Discharge [x]  None visible   [] Abnormal -     HENT: [x] Normocephalic, atraumatic  [] Abnormal -   [] Mouth/Throat: Mucous membranes are moist    External Ears [x] Normal  [] Abnormal -    Neck: [x] No visualized mass [] Abnormal -     Pulmonary/Chest: [x] Respiratory effort normal   [x] No visualized signs of difficulty breathing or respiratory distress        [] Abnormal -      Musculoskeletal:   [x] Normal gait with no signs of ataxia         [x] Normal range of motion of neck        [] Abnormal -     Neurological:        [x] No Facial Asymmetry (Cranial nerve 7 motor function) (limited exam due to video visit)          [x] No gaze palsy        [] Abnormal -          Skin:        [x] No significant exanthematous lesions or discoloration noted on facial skin         [] Abnormal -            Psychiatric:       [x] Normal Affect [] Abnormal -        [x] No Hallucinations    Other pertinent observable physical exam findings:-          MDM Low Linnell Dandy, was evaluated through a synchronous (real-time) audio-video encounter. The patient (or guardian if applicable) is aware that this is a billable service. Verbal consent to proceed has been obtained within the past 12 months.  The visit was conducted pursuant to the emergency declaration under the [address] and the

## 2021-07-02 DIAGNOSIS — Z12.31 ENCOUNTER FOR SCREENING MAMMOGRAM FOR MALIGNANT NEOPLASM OF BREAST: Primary | ICD-10-CM

## 2021-08-13 ENCOUNTER — OFFICE VISIT (OUTPATIENT)
Dept: FAMILY MEDICINE CLINIC | Age: 51
End: 2021-08-13
Payer: COMMERCIAL

## 2021-08-13 VITALS
BODY MASS INDEX: 25.58 KG/M2 | HEIGHT: 62 IN | OXYGEN SATURATION: 99 % | WEIGHT: 139 LBS | RESPIRATION RATE: 18 BRPM | SYSTOLIC BLOOD PRESSURE: 112 MMHG | HEART RATE: 93 BPM | DIASTOLIC BLOOD PRESSURE: 80 MMHG | TEMPERATURE: 97.2 F

## 2021-08-13 DIAGNOSIS — M05.79 RHEUMATOID ARTHRITIS INVOLVING MULTIPLE SITES WITH POSITIVE RHEUMATOID FACTOR (HCC): ICD-10-CM

## 2021-08-13 PROCEDURE — 99213 OFFICE O/P EST LOW 20 MIN: CPT | Performed by: NURSE PRACTITIONER

## 2021-08-13 RX ORDER — METHYLPREDNISOLONE 4 MG/1
TABLET ORAL
Qty: 1 KIT | Refills: 0 | Status: SHIPPED
Start: 2021-08-13 | End: 2022-03-04 | Stop reason: ALTCHOICE

## 2021-08-13 RX ORDER — HYDROCODONE BITARTRATE AND ACETAMINOPHEN 5; 325 MG/1; MG/1
1 TABLET ORAL EVERY 8 HOURS PRN
Qty: 90 TABLET | Refills: 0 | Status: SHIPPED
Start: 2021-08-13 | End: 2022-03-04 | Stop reason: SDUPTHER

## 2021-08-13 ASSESSMENT — ENCOUNTER SYMPTOMS
DIARRHEA: 0
NAUSEA: 0
BACK PAIN: 0
VOMITING: 0
COUGH: 0
SHORTNESS OF BREATH: 0
CONSTIPATION: 0
WHEEZING: 0

## 2021-08-13 NOTE — PROGRESS NOTES
Joceline Archer (:  1970) is a 49 y.o. female,Established patient, here for evaluation of the following chief complaint(s):  Arthritis (pt has rheumatoid arthritis. pt wants some type of pain meds )         ASSESSMENT/PLAN:  1. Rheumatoid arthritis involving multiple sites with positive rheumatoid factor (HCC)  -     HYDROcodone-acetaminophen (NORCO) 5-325 MG per tablet; Take 1 tablet by mouth every 8 hours as needed for Pain for up to 30 days. , Disp-90 tablet, R-0Normal  - The current medical regimen is effective;  continue present plan and medications. -     methylPREDNISolone (MEDROL, RENE,) 4 MG tablet; Take by mouth as directed, Disp-1 kit, R-0Normal  - Reviewed side effects of medication and patient verbalizes understanding.   - Advised to call back directly if there are further questions, or if these symptoms fail to improve as anticipated or worsen. Controlled Substance Monitoring:    Acute and Chronic Pain Monitoring:   RX Monitoring 2021   Attestation -   Periodic Controlled Substance Monitoring No signs of potential drug abuse or diversion identified.;Obtaining appropriate analgesic effect of treatment. Chronic Pain > 80 MEDD -         Return if symptoms worsen or fail to improve. Subjective   SUBJECTIVE/OBJECTIVE:  HPI  Complains of generalized joint pain-mainly hands and hips. She is having more pain and stiffness in her joints. She follows with rheumatology, needs to make an appointment. She feels like her meds are not working to control her arthritis. 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 She has an appointment with specialist in [facility] about right hip.  fracturing ribs. Review of Systems   Constitutional: Negative for activity change, appetite change, chills, diaphoresis and fever. Respiratory: Negative for cough, shortness of breath and wheezing. Cardiovascular: Negative for chest pain and palpitations.    Gastrointestinal: Negative for constipation, diarrhea, nausea and vomiting. Genitourinary: Negative for difficulty urinating. Musculoskeletal: Positive for arthralgias (right hip), joint swelling (bilateral hands) and myalgias. Negative for back pain. Neurological: Negative for dizziness, weakness and headaches. Objective   Physical Exam  Constitutional:       Appearance: She is well-developed. HENT:      Head: Normocephalic and atraumatic. Neck:      Thyroid: No thyromegaly. Trachea: No tracheal deviation. Cardiovascular:      Rate and Rhythm: Normal rate and regular rhythm. Heart sounds: No murmur heard. Pulmonary:      Effort: Pulmonary effort is normal.      Breath sounds: Normal breath sounds. Abdominal:      General: Bowel sounds are normal.      Palpations: Abdomen is soft. Tenderness: There is no abdominal tenderness. Musculoskeletal:         General: Swelling (bilateral hands) and tenderness present. Normal range of motion. Comments: Bilateral hands   Lymphadenopathy:      Cervical: No cervical adenopathy. Skin:     General: Skin is warm and dry. Neurological:      Mental Status: She is alert and oriented to person, place, and time. Psychiatric:         Behavior: Behavior normal.            [facility]      An electronic signature was used to authenticate this note.     --Kelly Matthew, APRN - CNP

## 2021-12-27 ENCOUNTER — TELEPHONE (OUTPATIENT)
Dept: FAMILY MEDICINE CLINIC | Age: 51
End: 2021-12-27

## 2021-12-27 DIAGNOSIS — U07.1 COVID: Primary | ICD-10-CM

## 2021-12-27 DIAGNOSIS — U07.1 COVID-19: Primary | ICD-10-CM

## 2021-12-27 RX ORDER — MULTIVIT WITH MINERALS/LUTEIN
1000 TABLET ORAL DAILY
Qty: 30 TABLET | Refills: 1 | Status: SHIPPED | OUTPATIENT
Start: 2021-12-27

## 2021-12-27 RX ORDER — DEXAMETHASONE 6 MG/1
6 TABLET ORAL
Qty: 10 TABLET | Refills: 0 | Status: SHIPPED | OUTPATIENT
Start: 2021-12-27 | End: 2022-01-06

## 2021-12-27 RX ORDER — BENZONATATE 100 MG/1
100 CAPSULE ORAL 3 TIMES DAILY PRN
Qty: 30 CAPSULE | Refills: 0 | Status: SHIPPED | OUTPATIENT
Start: 2021-12-27 | End: 2022-01-03

## 2021-12-27 NOTE — TELEPHONE ENCOUNTER
Spoke with patient. Had one dose of J&J. She has a cough (dry), head congestion, body aches, chills, NICHOLSON. Denies fever. Will start zinc 50, vit C 1000, vit D3 5000. Onset 8 days ago.  Decadron 6 mg once day for 10 days, tessalon pearls

## 2021-12-27 NOTE — TELEPHONE ENCOUNTER
Tested positive for Covid with an at home test.    Since she had lung cancer is there anything she should do or take ?     # [phone]

## 2021-12-27 NOTE — TELEPHONE ENCOUNTER
----- Message from Jazmin Garcia sent at 12/27/2021  4:17 PM EST -----  Subject: Message to Provider    QUESTIONS  Information for Provider? Pt is requesting Vitamin C & D to be sent to   [address] [place] [address],[address] Floor in [place]. She stated that she discussed this with   Henry Rivers today 12/27. Please call pt with any questions.  ---------------------------------------------------------------------------  --------------  CALL BACK INFO  What is the best way for the office to contact you? OK to leave message on   voicemail  Preferred Call Back Phone Number? [phone]  ---------------------------------------------------------------------------  --------------  SCRIPT ANSWERS  Relationship to Patient?  Self

## 2021-12-29 ENCOUNTER — TELEPHONE (OUTPATIENT)
Dept: FAMILY MEDICINE CLINIC | Age: 51
End: 2021-12-29

## 2021-12-29 NOTE — TELEPHONE ENCOUNTER
----- Message from Katrina Haro sent at 12/29/2021  9:00 AM EST -----  Subject: Message to Provider    QUESTIONS  Information for Provider? Patient spoke with Dread Khoury a few days ago   regarding testing positive for covid 5 days ago. She stated that her   daughter has also tested positive and she wanted to know if it would be   safe to bring her grandkids to her house since they are not showing any   symptoms or testing positive. Please call patient back to give further   advise.   ---------------------------------------------------------------------------  --------------  CALL BACK INFO  What is the best way for the office to contact you? OK to leave message on   voicemail  Preferred Call Back Phone Number? [phone]  ---------------------------------------------------------------------------  --------------  SCRIPT ANSWERS  Relationship to Patient?  Self

## 2022-02-25 ENCOUNTER — TELEPHONE (OUTPATIENT)
Dept: FAMILY MEDICINE CLINIC | Age: 52
End: 2022-02-25

## 2022-02-25 NOTE — TELEPHONE ENCOUNTER
----- Message from Hank Butler sent at 2/24/2022  4:42 PM EST -----  Subject: Refill Request    QUESTIONS  Name of Medication? Other - Steriods  Patient-reported dosage and instructions? n/a  How many days do you have left? 0  Preferred Pharmacy? [facility] phone number (if available)? [phone]  Additional Information for Provider? Patient requesting steroid medication   to treat her rheumatoid arthritis, she's having a bad flare up and current   meds are not helping  ---------------------------------------------------------------------------  --------------,  Name of Medication? HYDROcodone-acetaminophen (NORCO) 5-325 MG per tablet  Patient-reported dosage and instructions? 5-325mg  How many days do you have left? 0  Preferred Pharmacy? [facility] phone number (if available)? [phone]  ---------------------------------------------------------------------------  --------------  CALL BACK INFO  What is the best way for the office to contact you? Do not leave any   message, patient will call back for answer  Preferred Call Back Phone Number?  [phone]

## 2022-03-02 ENCOUNTER — TELEPHONE (OUTPATIENT)
Dept: FAMILY MEDICINE CLINIC | Age: 52
End: 2022-03-02

## 2022-03-02 NOTE — TELEPHONE ENCOUNTER
----- Message from Pamela Ward sent at 3/2/2022  4:47 PM EST -----  Subject: Message to Provider    QUESTIONS  Information for Provider? Patient wants to know if she can receive a   steroid medication. Please contact.  ---------------------------------------------------------------------------  --------------  CALL BACK INFO  What is the best way for the office to contact you? OK to leave message on   voicemail  Preferred Call Back Phone Number? [phone]  ---------------------------------------------------------------------------  --------------  SCRIPT ANSWERS  Relationship to Patient?  Self

## 2022-03-04 ENCOUNTER — OFFICE VISIT (OUTPATIENT)
Dept: FAMILY MEDICINE CLINIC | Age: 52
End: 2022-03-04
Payer: COMMERCIAL

## 2022-03-04 VITALS
HEART RATE: 98 BPM | TEMPERATURE: 97.6 F | SYSTOLIC BLOOD PRESSURE: 113 MMHG | DIASTOLIC BLOOD PRESSURE: 70 MMHG | HEIGHT: 62 IN | OXYGEN SATURATION: 96 % | BODY MASS INDEX: 27.79 KG/M2 | WEIGHT: 151 LBS | RESPIRATION RATE: 16 BRPM

## 2022-03-04 DIAGNOSIS — Z12.31 ENCOUNTER FOR SCREENING MAMMOGRAM FOR BREAST CANCER: ICD-10-CM

## 2022-03-04 DIAGNOSIS — M05.79 RHEUMATOID ARTHRITIS INVOLVING MULTIPLE SITES WITH POSITIVE RHEUMATOID FACTOR (HCC): Primary | ICD-10-CM

## 2022-03-04 PROCEDURE — 99213 OFFICE O/P EST LOW 20 MIN: CPT | Performed by: NURSE PRACTITIONER

## 2022-03-04 RX ORDER — METHYLPREDNISOLONE 4 MG/1
TABLET ORAL
Qty: 1 KIT | Refills: 1 | Status: SHIPPED
Start: 2022-03-04 | End: 2022-04-04 | Stop reason: CLARIF

## 2022-03-04 RX ORDER — HYDROCODONE BITARTRATE AND ACETAMINOPHEN 5; 325 MG/1; MG/1
1 TABLET ORAL EVERY 8 HOURS PRN
Qty: 90 TABLET | Refills: 0 | Status: SHIPPED
Start: 2022-03-04 | End: 2022-10-17 | Stop reason: SDUPTHER

## 2022-03-04 ASSESSMENT — ENCOUNTER SYMPTOMS
COUGH: 0
BACK PAIN: 0
WHEEZING: 1
DIARRHEA: 0
NAUSEA: 0
CONSTIPATION: 0
SHORTNESS OF BREATH: 0
VOMITING: 0

## 2022-03-04 NOTE — PROGRESS NOTES
Drew Melara (:  1970) is a 49 y.o. female,Established patient, here for evaluation of the following chief complaint(s):  Arthritis (rheumatoid flare pt states started last week )         ASSESSMENT/PLAN:  1. Rheumatoid arthritis involving multiple sites with positive rheumatoid factor (HCC)  -     HYDROcodone-acetaminophen (NORCO) 5-325 MG per tablet; Take 1 tablet by mouth every 8 hours as needed for Pain for up to 30 days. , Disp-90 tablet, R-0Normal  -     methylPREDNISolone (MEDROL, RENE,) 4 MG tablet; Take by mouth as directed, Disp-1 kit, R-1Normal  -     [facility] - Silvia,Bennie,, Rheumatology, [place]  2. Encounter for screening mammogram for breast cancer  -     [facility] RAUL DIGITAL SCREEN BILATERAL; Future  -  Reviewed side effects of medication and patient verbalizes understanding.   -  Advised to call back directly if there are further questions, or if these symptoms fail to improve as anticipated or worsen. Controlled Substance Monitoring:    Acute and Chronic Pain Monitoring:   RX Monitoring 3/4/2022   Attestation -   Periodic Controlled Substance Monitoring No signs of potential drug abuse or diversion identified.;Obtaining appropriate analgesic effect of treatment. Chronic Pain > 80 MEDD -         Return if symptoms worsen or fail to improve. Subjective   SUBJECTIVE/OBJECTIVE:  HPI  Here today for RA flare. She has not been able to get to her rheumatologist since she switched to nights. She is not taking her sulfasalazine or hydroxychloroquine since she has not been there. Would like steroids today and refill on her pain medication. /70   Pulse 98   Temp 97.6 °F (36.4 °C) (Temporal)   Resp 16   Ht 5' 2\" (1.575 m)   Wt 151 lb (68.5 kg)   LMP  (LMP Unknown)   SpO2 96%   BMI 27.62 kg/m²     Review of Systems   Constitutional: Positive for activity change (decreased due to RA flare).  Negative for appetite change, chills, diaphoresis, fever and unexpected weight change. Respiratory: Positive for wheezing. Negative for cough and shortness of breath. Cardiovascular: Negative for chest pain and palpitations. Gastrointestinal: Negative for constipation, diarrhea, nausea and vomiting. Genitourinary: Negative for difficulty urinating. Musculoskeletal: Positive for arthralgias (bilateral hands, feet and shoulders). Negative for back pain and neck pain. Neurological: Negative for dizziness, weakness and headaches. Psychiatric/Behavioral: Positive for sleep disturbance. Negative for dysphoric mood. The patient is not nervous/anxious. Objective   Physical Exam  Constitutional:       Appearance: She is well-developed. HENT:      Head: Normocephalic and atraumatic. Neck:      Thyroid: No thyromegaly. Trachea: No tracheal deviation. Cardiovascular:      Rate and Rhythm: Normal rate and regular rhythm. Heart sounds: No murmur heard. Pulmonary:      Effort: Pulmonary effort is normal. No respiratory distress. Breath sounds: Normal breath sounds. Abdominal:      General: Bowel sounds are normal.      Palpations: Abdomen is soft. Tenderness: There is no abdominal tenderness. Musculoskeletal:         General: Swelling (bilateral hands) and tenderness (bilateral hands, ROM limited due to pain and swelling) present. Normal range of motion. Lymphadenopathy:      Cervical: No cervical adenopathy. Skin:     General: Skin is warm and dry. Neurological:      Mental Status: She is alert and oriented to person, place, and time. Psychiatric:         Behavior: Behavior normal.            SAMIRA Sam      An electronic signature was used to authenticate this note.     --ALLEN Wong - CNP

## 2022-04-04 ENCOUNTER — OFFICE VISIT (OUTPATIENT)
Dept: FAMILY MEDICINE CLINIC | Age: 52
End: 2022-04-04
Payer: COMMERCIAL

## 2022-04-04 VITALS
WEIGHT: 151 LBS | TEMPERATURE: 98.2 F | BODY MASS INDEX: 27.79 KG/M2 | OXYGEN SATURATION: 96 % | HEIGHT: 62 IN | HEART RATE: 86 BPM | SYSTOLIC BLOOD PRESSURE: 112 MMHG | DIASTOLIC BLOOD PRESSURE: 78 MMHG | RESPIRATION RATE: 18 BRPM

## 2022-04-04 DIAGNOSIS — F41.9 ANXIETY: Primary | ICD-10-CM

## 2022-04-04 DIAGNOSIS — F32.A DEPRESSION, UNSPECIFIED DEPRESSION TYPE: ICD-10-CM

## 2022-04-04 DIAGNOSIS — Z13.31 POSITIVE DEPRESSION SCREENING: ICD-10-CM

## 2022-04-04 PROCEDURE — 99213 OFFICE O/P EST LOW 20 MIN: CPT | Performed by: NURSE PRACTITIONER

## 2022-04-04 RX ORDER — BUSPIRONE HYDROCHLORIDE 7.5 MG/1
7.5 TABLET ORAL 2 TIMES DAILY PRN
Qty: 60 TABLET | Refills: 0 | Status: SHIPPED | OUTPATIENT
Start: 2022-04-04 | End: 2022-05-04

## 2022-04-04 RX ORDER — SERTRALINE HYDROCHLORIDE 25 MG/1
25 TABLET, FILM COATED ORAL DAILY
Qty: 30 TABLET | Refills: 0 | Status: SHIPPED
Start: 2022-04-04 | End: 2022-10-14 | Stop reason: SDUPTHER

## 2022-04-04 ASSESSMENT — PATIENT HEALTH QUESTIONNAIRE - PHQ9
5. POOR APPETITE OR OVEREATING: 0
9. THOUGHTS THAT YOU WOULD BE BETTER OFF DEAD, OR OF HURTING YOURSELF: 1
SUM OF ALL RESPONSES TO PHQ QUESTIONS 1-9: 11
3. TROUBLE FALLING OR STAYING ASLEEP: 3
4. FEELING TIRED OR HAVING LITTLE ENERGY: 3
8. MOVING OR SPEAKING SO SLOWLY THAT OTHER PEOPLE COULD HAVE NOTICED. OR THE OPPOSITE, BEING SO FIGETY OR RESTLESS THAT YOU HAVE BEEN MOVING AROUND A LOT MORE THAN USUAL: 0
SUM OF ALL RESPONSES TO PHQ QUESTIONS 1-9: 12
10. IF YOU CHECKED OFF ANY PROBLEMS, HOW DIFFICULT HAVE THESE PROBLEMS MADE IT FOR YOU TO DO YOUR WORK, TAKE CARE OF THINGS AT HOME, OR GET ALONG WITH OTHER PEOPLE: 1
SUM OF ALL RESPONSES TO PHQ QUESTIONS 1-9: 12
SUM OF ALL RESPONSES TO PHQ9 QUESTIONS 1 & 2: 3
7. TROUBLE CONCENTRATING ON THINGS, SUCH AS READING THE NEWSPAPER OR WATCHING TELEVISION: 0
1. LITTLE INTEREST OR PLEASURE IN DOING THINGS: 1
6. FEELING BAD ABOUT YOURSELF - OR THAT YOU ARE A FAILURE OR HAVE LET YOURSELF OR YOUR FAMILY DOWN: 2
SUM OF ALL RESPONSES TO PHQ QUESTIONS 1-9: 12
2. FEELING DOWN, DEPRESSED OR HOPELESS: 2

## 2022-04-04 ASSESSMENT — ENCOUNTER SYMPTOMS
DIARRHEA: 0
COUGH: 0
WHEEZING: 0
SHORTNESS OF BREATH: 0
CONSTIPATION: 0
NAUSEA: 0
VOMITING: 0

## 2022-04-04 ASSESSMENT — COLUMBIA-SUICIDE SEVERITY RATING SCALE - C-SSRS
6. HAVE YOU EVER DONE ANYTHING, STARTED TO DO ANYTHING, OR PREPARED TO DO ANYTHING TO END YOUR LIFE?: NO
2. HAVE YOU ACTUALLY HAD ANY THOUGHTS OF KILLING YOURSELF?: NO
1. WITHIN THE PAST MONTH, HAVE YOU WISHED YOU WERE DEAD OR WISHED YOU COULD GO TO SLEEP AND NOT WAKE UP?: YES

## 2022-04-04 NOTE — PROGRESS NOTES
Toni Fowler (:  1970) is a 49 y.o. female,Established patient, here for evaluation of the following chief complaint(s): Anxiety (had a mental breakdown because of work) and Depression         ASSESSMENT/PLAN:  1. Anxiety  -     sertraline (ZOLOFT) 25 MG tablet; Take 1 tablet by mouth daily, Disp-30 tablet, R-0Normal  -     busPIRone (BUSPAR) 7.5 MG tablet; Take 1 tablet by mouth 2 times daily as needed (anxiety), Disp-60 tablet, R-0Normal    2. Depression, unspecified depression type  -     sertraline (ZOLOFT) 25 MG tablet; Take 1 tablet by mouth daily, Disp-30 tablet, R-0Normal  -call back in 10 to 14 days with update on symptoms  -do not abruptly stop taking medication    3. Positive depression screening  PHQ-9 score today: (PHQ-9 Total Score: 12), additional evaluation and assessment performed, follow-up plan includes but not limited to: Medication management and Referral to /Specialist  for evaluation and management.   -contact information for Ebenezer Avery CNP provided. No follow-ups on file. Subjective   SUBJECTIVE/OBJECTIVE:  Complains of anxiety and depression. Weaned herself off of zoloft 2 months ago. States feels overwhelmed. Difficulties at work, family stressors. She had breakdown at work last week. She has been off work since last Wednesday. Also recently went back to midnight shift. Has never had any medication other than zoloft. Denies panic attacks but does admit to feeling rage. During episode at work is the only time she felt SI. She has not been to counseling in the past      Review of Systems   Constitutional: Positive for fatigue. Negative for activity change, appetite change and unexpected weight change. Respiratory: Negative for cough, shortness of breath and wheezing. Cardiovascular: Positive for chest pain and palpitations. During episode at work   Gastrointestinal: Negative for constipation, diarrhea, nausea and vomiting.    Neurological:

## 2022-06-02 ENCOUNTER — OFFICE VISIT (OUTPATIENT)
Dept: RHEUMATOLOGY | Age: 52
End: 2022-06-02
Payer: COMMERCIAL

## 2022-06-02 VITALS
BODY MASS INDEX: 25.76 KG/M2 | RESPIRATION RATE: 14 BRPM | HEART RATE: 96 BPM | OXYGEN SATURATION: 98 % | DIASTOLIC BLOOD PRESSURE: 84 MMHG | WEIGHT: 140 LBS | SYSTOLIC BLOOD PRESSURE: 122 MMHG | HEIGHT: 62 IN

## 2022-06-02 DIAGNOSIS — D84.9 IMMUNOSUPPRESSION (HCC): ICD-10-CM

## 2022-06-02 DIAGNOSIS — M05.79 RHEUMATOID ARTHRITIS INVOLVING MULTIPLE SITES WITH POSITIVE RHEUMATOID FACTOR (HCC): ICD-10-CM

## 2022-06-02 DIAGNOSIS — M05.79 RHEUMATOID ARTHRITIS INVOLVING MULTIPLE SITES WITH POSITIVE RHEUMATOID FACTOR (HCC): Primary | ICD-10-CM

## 2022-06-02 DIAGNOSIS — Z79.899 HIGH RISK MEDICATION USE: ICD-10-CM

## 2022-06-02 DIAGNOSIS — Z85.118 HISTORY OF LUNG CANCER: ICD-10-CM

## 2022-06-02 LAB
ALBUMIN SERPL-MCNC: 4 G/DL (ref 3.5–5.2)
ALP BLD-CCNC: 97 U/L (ref 35–104)
ALT SERPL-CCNC: 9 U/L (ref 0–32)
ANION GAP SERPL CALCULATED.3IONS-SCNC: 11 MMOL/L (ref 7–16)
AST SERPL-CCNC: 17 U/L (ref 0–31)
BASOPHILS ABSOLUTE: 0.04 E9/L (ref 0–0.2)
BASOPHILS RELATIVE PERCENT: 0.7 % (ref 0–2)
BILIRUB SERPL-MCNC: 0.7 MG/DL (ref 0–1.2)
BUN BLDV-MCNC: 8 MG/DL (ref 6–20)
C-REACTIVE PROTEIN: 2.7 MG/DL (ref 0–0.4)
CALCIUM SERPL-MCNC: 9.4 MG/DL (ref 8.6–10.2)
CHLORIDE BLD-SCNC: 101 MMOL/L (ref 98–107)
CO2: 26 MMOL/L (ref 22–29)
CREAT SERPL-MCNC: 0.7 MG/DL (ref 0.5–1)
EOSINOPHILS ABSOLUTE: 0.21 E9/L (ref 0.05–0.5)
EOSINOPHILS RELATIVE PERCENT: 3.6 % (ref 0–6)
GFR AFRICAN AMERICAN: >60
GFR NON-AFRICAN AMERICAN: >60 ML/MIN/1.73
GLUCOSE BLD-MCNC: 84 MG/DL (ref 74–99)
HCT VFR BLD CALC: 43 % (ref 34–48)
HEMOGLOBIN: 13.9 G/DL (ref 11.5–15.5)
IMMATURE GRANULOCYTES #: 0.01 E9/L
IMMATURE GRANULOCYTES %: 0.2 % (ref 0–5)
LYMPHOCYTES ABSOLUTE: 0.97 E9/L (ref 1.5–4)
LYMPHOCYTES RELATIVE PERCENT: 16.6 % (ref 20–42)
MCH RBC QN AUTO: 29.4 PG (ref 26–35)
MCHC RBC AUTO-ENTMCNC: 32.3 % (ref 32–34.5)
MCV RBC AUTO: 90.9 FL (ref 80–99.9)
MONOCYTES ABSOLUTE: 0.51 E9/L (ref 0.1–0.95)
MONOCYTES RELATIVE PERCENT: 8.7 % (ref 2–12)
NEUTROPHILS ABSOLUTE: 4.09 E9/L (ref 1.8–7.3)
NEUTROPHILS RELATIVE PERCENT: 70.2 % (ref 43–80)
PDW BLD-RTO: 13.5 FL (ref 11.5–15)
PLATELET # BLD: 276 E9/L (ref 130–450)
PMV BLD AUTO: 11.3 FL (ref 7–12)
POTASSIUM SERPL-SCNC: 3.4 MMOL/L (ref 3.5–5)
RBC # BLD: 4.73 E12/L (ref 3.5–5.5)
RHEUMATOID FACTOR: >650 IU/ML (ref 0–13)
SEDIMENTATION RATE, ERYTHROCYTE: 64 MM/HR (ref 0–20)
SODIUM BLD-SCNC: 138 MMOL/L (ref 132–146)
TOTAL PROTEIN: 7.5 G/DL (ref 6.4–8.3)
WBC # BLD: 5.8 E9/L (ref 4.5–11.5)

## 2022-06-02 PROCEDURE — 99205 OFFICE O/P NEW HI 60 MIN: CPT | Performed by: INTERNAL MEDICINE

## 2022-06-02 RX ORDER — HYDROXYCHLOROQUINE SULFATE 200 MG/1
300 TABLET, FILM COATED ORAL DAILY
Qty: 45 TABLET | Refills: 5 | Status: SHIPPED | OUTPATIENT
Start: 2022-06-02

## 2022-06-02 RX ORDER — PREDNISONE 1 MG/1
15 TABLET ORAL DAILY
Qty: 90 TABLET | Refills: 1 | Status: SHIPPED
Start: 2022-06-02 | End: 2022-09-12 | Stop reason: SDUPTHER

## 2022-06-02 RX ORDER — LEFLUNOMIDE 20 MG/1
20 TABLET ORAL DAILY
Qty: 30 TABLET | Refills: 5 | Status: SHIPPED | OUTPATIENT
Start: 2022-06-02

## 2022-06-02 ASSESSMENT — ENCOUNTER SYMPTOMS
COUGH: 0
NAUSEA: 0
ABDOMINAL PAIN: 0
DIARRHEA: 0
COLOR CHANGE: 0
TROUBLE SWALLOWING: 0
SHORTNESS OF BREATH: 0
VOMITING: 0

## 2022-06-02 NOTE — PATIENT INSTRUCTIONS
Progress Notes by Nazario Silverman MD at 08/01/18 07:49 AM     Author:  Nazario Silverman MD Service:  (none) Author Type:  Physician     Filed:  08/01/18 07:50 AM Encounter Date:  8/1/2018 Status:  Signed     :  Nazario Silverman MD (Physician)            [facility] [address], [place] [address]      PATIENT NAME:  Beatriz Pa  [facility]. REC. #:  [id number]    DATE:  8/1/2018 YOB: 2014    PHYSICIAN:  Nazario Silverman M.D.       PREOPERATIVE DIAGNOSIS:[RK1.1T]  Adenotonsillar hypertrophy and Chronic adenotonsillitis[RK1.1M]    POSTOPERATIVE DIAGNOSIS:[RK1.1T]  Adenotonsillar hypertrophy and Chronic adenotonsillitis[RK1.1M]    PROCEDURE:  Adenotonsillectomy      ANESTHESIA:  General.      ESTIMATED BLOOD LOSS:[RK1.1T]  10[RK1.1M] mL.    OPERATIVE REPORT:  The patient was brought to the operating room and placed in the supine position. After the induction of general anesthesia via oral endotracheal tube intubation, a shoulder roll was placed, the neck extended. The patient was draped in the usual sterile technique.  A Ginny-Latrell mouth gag was then placed. Red rubber catheter placed through the right naris and brought out the mouth to retract the soft palate. Under mirror visualization, the adenoids were removed with the various adenoid curets and the nasopharynx packed. The left tonsil was grasped with a tenaculum.  A superior pole mucosal incision was made with the electrocautery. The tonsil with its capsule was dissected free in a superior to inferior direction using traction and electrocautery. Any remaining bleeding was controlled with suction electrocautery. The contralateral tonsil was excised in an identical fashion. Packing was then removed from the nasopharynx. Under mirror visualization, hemostasis was achieved with the suction electrocautery. After assuring adequate hemostasis had been achieved, the pharynx was irrigated with saline and suctioned  Start Arava one tab daily    Start plaquenil 1.5 tabs daily    Start prednisone 3 tabs daily for one month, then 2 tabs daily for one month, then one tab daily for one month, then stop    Have blood work monthly    Have Xrays free.  The patient's stomach was suctioned free. The Ginny-Latrell mouth gag and red rubber catheter were removed.  The patient was extubated in the operating room and brought to the recovery room in stable condition.      ___________________________  Nazario Silverman M.D.  OTOLARYNGOLOGY[RK1.1T]        Revision History        User Key Date/Time User Provider Type Action    > RK1.1 08/01/18 07:50 AM Nazario Silverman MD Physician Sign    M - Manual, T - Template

## 2022-06-02 NOTE — PROGRESS NOTES
Isha Contreras 1970 is a 47 y.o. female, here for evaluation of the following chief complaint(s):  New Patient (rheumatoid arthritis, wants to discuss medication options )         ASSESSMENT/PLAN:    Isha Contreras 1970 is a 47 y.o. female seen in consult for rheumatoid arthritis. 1.  Rheumatoid arthritis-she does have a high positive rheumatoid factor from back in 2016. She has obvious active synovitis on exam.  We will start her back on Plaquenil 300 mg daily based on her weight. There was some question of whether she was on sulfasalazine or not. She did not tolerate methotrexate. I do not think Plaquenil alone will be enough to control the amount of diffuse synovitis that she has so we will also start her on Arava 20 mg daily. We will also start her on prednisone 15 mg daily for 1 month, then 10 mg daily for 1 month, then 5 mg daily for 1 month. We will need blood work and x-rays as below to get a better characterization of her disease. 2.  Immunosuppression-I recommend she stay up-to-date on vaccinations. In the event of any acute infectious illness she should hold [address] 3.  Medication monitoring-we will update TB and hepatitis. We will check a chest x-ray. We will need to monitor CBC and CMP monthly for the first 3 months on [address] She will follow with the eye doctor regularly while on Plaquenil. 4.  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 No increased risk for solid tumors if we need to use a TNF inhibitor in the future. 1. Rheumatoid arthritis involving multiple sites with positive rheumatoid factor ([facility] [address]  -     Comprehensive Metabolic Panel; Future  -     C-Reactive Protein; Future  -     Sedimentation Rate; Future  -     Rheumatoid Factor; Future  -     Cyclic Citrul Peptide Antibody, IgG;  Future  -     CBC with Auto Differential; Future  -     CBC with Auto Differential; Standing  -     Comprehensive Quality 111:Pneumonia Vaccination Status For Older Adults: Pneumococcal Vaccination Previously Received Metabolic Panel; Standing  -     XR CHEST (SINGLE VIEW FRONTAL); Future  -     Hepatitis C Antibody; Future  -     Hepatitis B Core Antibody, IgM; Future  -     Hepatitis B Surface Antibody; Future  -     Hepatitis B Surface Antigen; Future  -     T-Spot TB Test; Future  -     hydroxychloroquine (PLAQUENIL) 200 mg tablet; Take 1.5 tablets by mouth daily, Disp-45 tablet, R-5Normal  -     XR HAND LEFT (MIN 3 VIEWS); Future  -     XR HAND RIGHT (MIN 3 VIEWS); Future  -     XR FOOT LEFT (MIN 3 VIEWS); Future  -     XR FOOT RIGHT (MIN 3 VIEWS); Future  -     XR KNEE LEFT (1-2 VIEWS); Future  -     XR KNEE RIGHT (1-2 VIEWS); Future  2. Immunosuppression ([address]  3. High risk medication use  4. History of lung cancer      Return in about 3 months (around 9/2/2022). Subjective   SUBJECTIVE/OBJECTIVE:    HPI: Isha Contreras 1970 is a 47 y.o. female seen in consult for rheumatoid arthritis. Patient states that she was diagnosed in 2016 or 17 initially with bilateral hand pain and swelling. She then went on to have more diffuse joint involvement including the feet, elbows, knees. She actually needs a right hip replacement. She was seeing a rheumatologist in [facility] and states that she was pretty well controlled on she thinks just Plaquenil. However review of notes suggest that she was may be on sulfasalazine. She feels like sulfasalazine sounds familiar but cannot really recall being on it. She did not tolerate methotrexate because of pruritus. Nevertheless she has not seen her rheumatologist in over a year and been off of all medications for over a year. She is complaining of a lot of diffuse joint pain and swelling. Of note she does have a history of squamous cell lung cancer which is in remission. She has no other extra-articular manifestations.     Past Medical History:   Diagnosis Date    Acute nasopharyngitis 3/14/2016    Bronchogenic carcinoma of right lung ([address] 1/14/2016    Cancer ([address] lung Quality 431: Preventive Care And Screening: Unhealthy Alcohol Use - Screening: Patient screened for unhealthy alcohol use using a single question and scores less than 2 times per year Lung     Depression 6/22/2016    Lung mass 4/10/2019    Pneumonia     Rheumatoid arthritis ([facility] [address]     Rheumatoid arthritis involving multiple sites with positive rheumatoid factor ([facility] [address] 9/7/2018        Review of Systems   Constitutional: Negative for fatigue and fever. HENT: Negative for mouth sores and trouble swallowing. Respiratory: Negative for cough and shortness of breath. Cardiovascular: Negative for chest pain. Gastrointestinal: Negative for abdominal pain, diarrhea, nausea and vomiting. Genitourinary: Negative for dysuria and hematuria. Musculoskeletal: Positive for arthralgias and joint swelling. Skin: Negative for color change and rash. Neurological: Negative for weakness and numbness. Hematological: Negative for adenopathy. All other systems reviewed and are negative. Objective   Vitals:    06/02/22 0920   BP: 122/84   Pulse: 96   Resp: 14   SpO2: 98%      Physical Exam  Constitutional:       General: She is not in acute distress. Appearance: Normal appearance. HENT:      Head: Normocephalic and atraumatic. Right Ear: External ear normal.      Left Ear: External ear normal.      Nose: Nose normal.   Eyes:      General: No scleral icterus. Pulmonary:      Effort: Pulmonary effort is normal.   Musculoskeletal:         General: Swelling and tenderness present. No deformity. Comments: He has diffuse synovitis throughout the hands and wrists. She has rheumatoid nodules at the left elbow. Skin:     General: Skin is warm and dry. Findings: No rash. Neurological:      General: No focal deficit present. Mental Status: She is alert and oriented to person, place, and time. Mental status is at baseline.    Psychiatric:         Mood and Affect: Mood normal.         Behavior: Behavior normal.            Lab Results   Component Value Date    WBC 8.7 04/13/2019    HGB 10.4 (L) 04/13/2019    HCT 33.1 (L) 04/13/2019    MCV 88.0 04/13/2019     Quality 226: Preventive Care And Screening: Tobacco Use: Screening And Cessation Intervention: Patient screened for tobacco and never smoked 04/13/2019     Lab Results   Component Value Date     06/26/2020    K 3.7 06/26/2020     06/26/2020    CO2 24 06/26/2020    BUN 11 06/26/2020    CREATININE 0.7 06/26/2020    GLUCOSE 101 (H) 06/26/2020    CALCIUM 9.0 06/26/2020    PROT 7.2 04/12/2019    LABALBU 3.5 04/12/2019    BILITOT 0.2 04/12/2019    ALKPHOS 71 04/12/2019    AST 8 04/12/2019    ALT 6 04/12/2019    LABGLOM >60 06/26/2020    GFRAA >60 06/26/2020     Lab Results   Component Value Date    TERE POSITIVE (A) 06/01/2016     No results found for: RHEUMFACTOR  Lab Results   Component Value Date    SEDRATE 90 (H) 06/01/2016     Lab Results   Component Value Date    CRP 1.9 (H) 06/01/2016            An electronic signature was used to authenticate this note. This note was generated with a voice recognition dictation system. Please disregard any errors or omission which have escaped my review.     --Ulysses First, DO Detail Level: Detailed Quality 110: Preventive Care And Screening: Influenza Immunization: Influenza Immunization Administered during Influenza season

## 2022-06-04 LAB
HBV SURFACE AB TITR SER: NORMAL {TITER}
HEPATITIS B CORE IGM ANTIBODY: NORMAL
HEPATITIS B SURFACE ANTIGEN INTERPRETATION: NORMAL
HEPATITIS C ANTIBODY INTERPRETATION: NORMAL

## 2022-06-06 LAB — CYCLIC CITRULLINATED PEPTIDE ANTIBODY IGG: >340 U/ML (ref 0–7)

## 2022-07-26 ENCOUNTER — HOSPITAL ENCOUNTER (OUTPATIENT)
Age: 52
Discharge: HOME | End: 2022-07-26
Payer: COMMERCIAL

## 2022-07-26 DIAGNOSIS — M05.79 RHEUMATOID ARTHRITIS INVOLVING MULTIPLE SITES WITH POSITIVE RHEUMATOID FACTOR (HCC): ICD-10-CM

## 2022-07-26 LAB
ALBUMIN SERPL-MCNC: 4.2 G/DL (ref 3.5–5.2)
ALP BLD-CCNC: 135 U/L (ref 35–104)
ALT SERPL-CCNC: 9 U/L (ref 0–32)
ANION GAP SERPL CALCULATED.3IONS-SCNC: 13 MMOL/L (ref 7–16)
AST SERPL-CCNC: 17 U/L (ref 0–31)
BASOPHILS ABSOLUTE: 0.03 E9/L (ref 0–0.2)
BASOPHILS RELATIVE PERCENT: 0.4 % (ref 0–2)
BILIRUB SERPL-MCNC: 0.4 MG/DL (ref 0–1.2)
BUN BLDV-MCNC: 5 MG/DL (ref 6–20)
CALCIUM SERPL-MCNC: 9.4 MG/DL (ref 8.6–10.2)
CHLORIDE BLD-SCNC: 105 MMOL/L (ref 98–107)
CO2: 23 MMOL/L (ref 22–29)
CREAT SERPL-MCNC: 0.5 MG/DL (ref 0.5–1)
EOSINOPHILS ABSOLUTE: 0.01 E9/L (ref 0.05–0.5)
EOSINOPHILS RELATIVE PERCENT: 0.1 % (ref 0–6)
GFR AFRICAN AMERICAN: >60
GFR NON-AFRICAN AMERICAN: >60 ML/MIN/1.73
GLUCOSE BLD-MCNC: 140 MG/DL (ref 74–99)
HCT VFR BLD CALC: 44.6 % (ref 34–48)
HEMOGLOBIN: 14.5 G/DL (ref 11.5–15.5)
IMMATURE GRANULOCYTES #: 0.02 E9/L
IMMATURE GRANULOCYTES %: 0.3 % (ref 0–5)
LYMPHOCYTES ABSOLUTE: 0.59 E9/L (ref 1.5–4)
LYMPHOCYTES RELATIVE PERCENT: 7.7 % (ref 20–42)
MCH RBC QN AUTO: 29.9 PG (ref 26–35)
MCHC RBC AUTO-ENTMCNC: 32.5 % (ref 32–34.5)
MCV RBC AUTO: 92 FL (ref 80–99.9)
MONOCYTES ABSOLUTE: 0.29 E9/L (ref 0.1–0.95)
MONOCYTES RELATIVE PERCENT: 3.8 % (ref 2–12)
NEUTROPHILS ABSOLUTE: 6.73 E9/L (ref 1.8–7.3)
NEUTROPHILS RELATIVE PERCENT: 87.7 % (ref 43–80)
PDW BLD-RTO: 14.9 FL (ref 11.5–15)
PLATELET # BLD: 297 E9/L (ref 130–450)
PMV BLD AUTO: 11.1 FL (ref 7–12)
POTASSIUM SERPL-SCNC: 3.5 MMOL/L (ref 3.5–5)
RBC # BLD: 4.85 E12/L (ref 3.5–5.5)
RBC # BLD: NORMAL 10*6/UL
SODIUM BLD-SCNC: 141 MMOL/L (ref 132–146)
TOTAL PROTEIN: 7.7 G/DL (ref 6.4–8.3)
WBC # BLD: 7.7 E9/L (ref 4.5–11.5)

## 2022-07-26 PROCEDURE — 85025 COMPLETE CBC W/AUTO DIFF WBC: CPT

## 2022-07-26 PROCEDURE — 36415 COLL VENOUS BLD VENIPUNCTURE: CPT

## 2022-07-26 PROCEDURE — 80053 COMPREHEN METABOLIC PANEL: CPT

## 2022-09-12 ENCOUNTER — OFFICE VISIT (OUTPATIENT)
Dept: RHEUMATOLOGY | Age: 52
End: 2022-09-12
Payer: COMMERCIAL

## 2022-09-12 VITALS
OXYGEN SATURATION: 99 % | WEIGHT: 136 LBS | RESPIRATION RATE: 16 BRPM | SYSTOLIC BLOOD PRESSURE: 124 MMHG | BODY MASS INDEX: 25.03 KG/M2 | HEART RATE: 102 BPM | DIASTOLIC BLOOD PRESSURE: 76 MMHG | HEIGHT: 62 IN

## 2022-09-12 DIAGNOSIS — Z79.899 HIGH RISK MEDICATION USE: ICD-10-CM

## 2022-09-12 DIAGNOSIS — Z85.118 HISTORY OF LUNG CANCER: ICD-10-CM

## 2022-09-12 DIAGNOSIS — M05.79 RHEUMATOID ARTHRITIS INVOLVING MULTIPLE SITES WITH POSITIVE RHEUMATOID FACTOR (HCC): Primary | ICD-10-CM

## 2022-09-12 DIAGNOSIS — D84.9 IMMUNOSUPPRESSION (HCC): ICD-10-CM

## 2022-09-12 DIAGNOSIS — M05.79 RHEUMATOID ARTHRITIS INVOLVING MULTIPLE SITES WITH POSITIVE RHEUMATOID FACTOR (HCC): ICD-10-CM

## 2022-09-12 LAB
ALBUMIN SERPL-MCNC: 4 G/DL (ref 3.5–5.2)
ALP BLD-CCNC: 117 U/L (ref 35–104)
ALT SERPL-CCNC: 25 U/L (ref 0–32)
ANION GAP SERPL CALCULATED.3IONS-SCNC: 12 MMOL/L (ref 7–16)
AST SERPL-CCNC: 33 U/L (ref 0–31)
BASOPHILS ABSOLUTE: 0.05 E9/L (ref 0–0.2)
BASOPHILS RELATIVE PERCENT: 0.9 % (ref 0–2)
BILIRUB SERPL-MCNC: 0.4 MG/DL (ref 0–1.2)
BUN BLDV-MCNC: 7 MG/DL (ref 6–20)
C-REACTIVE PROTEIN: 0.5 MG/DL (ref 0–0.4)
CALCIUM SERPL-MCNC: 9.4 MG/DL (ref 8.6–10.2)
CHLORIDE BLD-SCNC: 103 MMOL/L (ref 98–107)
CO2: 25 MMOL/L (ref 22–29)
CREAT SERPL-MCNC: 0.6 MG/DL (ref 0.5–1)
EOSINOPHILS ABSOLUTE: 0.18 E9/L (ref 0.05–0.5)
EOSINOPHILS RELATIVE PERCENT: 3.1 % (ref 0–6)
GFR AFRICAN AMERICAN: >60
GFR NON-AFRICAN AMERICAN: >60 ML/MIN/1.73
GLUCOSE BLD-MCNC: 85 MG/DL (ref 74–99)
HCT VFR BLD CALC: 42.9 % (ref 34–48)
HEMOGLOBIN: 14 G/DL (ref 11.5–15.5)
IMMATURE GRANULOCYTES #: 0.02 E9/L
IMMATURE GRANULOCYTES %: 0.3 % (ref 0–5)
LYMPHOCYTES ABSOLUTE: 1.04 E9/L (ref 1.5–4)
LYMPHOCYTES RELATIVE PERCENT: 17.7 % (ref 20–42)
MCH RBC QN AUTO: 30.5 PG (ref 26–35)
MCHC RBC AUTO-ENTMCNC: 32.6 % (ref 32–34.5)
MCV RBC AUTO: 93.5 FL (ref 80–99.9)
MONOCYTES ABSOLUTE: 0.55 E9/L (ref 0.1–0.95)
MONOCYTES RELATIVE PERCENT: 9.4 % (ref 2–12)
NEUTROPHILS ABSOLUTE: 4.03 E9/L (ref 1.8–7.3)
NEUTROPHILS RELATIVE PERCENT: 68.6 % (ref 43–80)
PDW BLD-RTO: 14.4 FL (ref 11.5–15)
PLATELET # BLD: 207 E9/L (ref 130–450)
PMV BLD AUTO: 12.7 FL (ref 7–12)
POTASSIUM SERPL-SCNC: 4.2 MMOL/L (ref 3.5–5)
RBC # BLD: 4.59 E12/L (ref 3.5–5.5)
SEDIMENTATION RATE, ERYTHROCYTE: 23 MM/HR (ref 0–20)
SODIUM BLD-SCNC: 140 MMOL/L (ref 132–146)
TOTAL PROTEIN: 7.3 G/DL (ref 6.4–8.3)
WBC # BLD: 5.9 E9/L (ref 4.5–11.5)

## 2022-09-12 PROCEDURE — 99215 OFFICE O/P EST HI 40 MIN: CPT | Performed by: INTERNAL MEDICINE

## 2022-09-12 RX ORDER — PREDNISONE 1 MG/1
2.5 TABLET ORAL DAILY
Qty: 30 TABLET | Refills: 1 | Status: SHIPPED | OUTPATIENT
Start: 2022-09-12

## 2022-09-12 ASSESSMENT — ENCOUNTER SYMPTOMS
ABDOMINAL PAIN: 0
COLOR CHANGE: 0
VOMITING: 0
SHORTNESS OF BREATH: 0
COUGH: 0
DIARRHEA: 0
TROUBLE SWALLOWING: 0
NAUSEA: 0

## 2022-09-12 NOTE — PROGRESS NOTES
Arianna German 1970 is a 47 y.o. female, here for evaluation of the following chief complaint(s):  Follow-up (Patient here for follow up on RA. No new concerns noted. )         ASSESSMENT/PLAN:    Arianna German 1970 is a 47 y.o. female seen in follow-up for rheumatoid arthritis. 1.  RF positive, CCP positive rheumatoid arthritis-she has had an excellent response to Plaquenil 300 mg daily, Arava 20 mg daily, and a prednisone taper. She has tapered her prednisone down to 5 mg daily. She likely has a little synovial hypertrophy in the second and third MCP joints on the right but otherwise no obvious active synovitis. We will decrease her prednisone to 2.5 mg daily for 1 month, then 2.5 mg every other day for 1 month, then stop. 2.  Immunosuppression-I recommend she stay up-to-date on vaccinations. In the event of any acute infectious illness she should hold [address] 3.  Medication monitoring-we will need to monitor basic blood work every 3 months on [address] She will follow with the eye doctor regularly while on Plaquenil. 4.  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 No increased risk for solid tumors if we need to use a TNF inhibitor in the future. 1. Rheumatoid arthritis involving multiple sites with positive rheumatoid factor (HCC)  -     CBC with Auto Differential; Future  -     Comprehensive Metabolic Panel; Future  -     C-Reactive Protein; Future  -     Sedimentation Rate; Future  2. Immunosuppression ([address]  3. History of lung cancer  4. High risk medication use  -     CBC with Auto Differential; Future  -     Comprehensive Metabolic Panel; Future  -     C-Reactive Protein; Future  -     Sedimentation Rate; Future      Return in about 3 months (around 12/12/2022). Subjective   SUBJECTIVE/OBJECTIVE:    HPI: Arianna German 1970 is a 47 y.o. female seen in follow-up for rheumatoid arthritis.   He has had an excellent response to Plaquenil 300 mg daily, Arava 20 mg daily, and a prednisone taper. Her prednisone has been tapered down to 5 mg daily. She denies any significantly painful or swollen joints. She has no extra-articular manifestations. She is tolerating her medications well. Initial history: Patient states that she was diagnosed in 2016 or 17 initially with bilateral hand pain and swelling. She then went on to have more diffuse joint involvement including the feet, elbows, knees. She actually needs a right hip replacement. [facility] and states that she was pretty well controlled on she thinks just Plaquenil. However review of notes suggest that she was may be on sulfasalazine. She feels like sulfasalazine sounds familiar but cannot really recall being on it. She did not tolerate methotrexate because of pruritus. Nevertheless she has not seen her rheumatologist in over a year and been off of all medications for over a year. She is complaining of a lot of diffuse joint pain and swelling. Of note she does have a history of squamous cell lung cancer which is in remission. She has no other extra-articular manifestations. Past Medical History:   Diagnosis Date    Acute nasopharyngitis 3/14/2016    Bronchogenic carcinoma of right lung ([facility] [place] [address] 1/14/2016    Cancer ([facility] [place] [address] lung    Lung     Depression 6/22/2016    Lung mass 4/10/2019    Pneumonia     Rheumatoid arthritis (HCC)     Rheumatoid arthritis involving multiple sites with positive rheumatoid factor ([facility] [place] [address] 9/7/2018        Review of Systems   Constitutional:  Negative for fatigue and fever. HENT:  Negative for mouth sores and trouble swallowing. Respiratory:  Negative for cough and shortness of breath. Cardiovascular:  Negative for chest pain. Gastrointestinal:  Negative for abdominal pain, diarrhea, nausea and vomiting. Genitourinary:  Negative for dysuria and hematuria.    Musculoskeletal:  Negative for arthralgias and joint swelling. Skin:  Negative for color change and rash. Neurological:  Negative for weakness and numbness. Hematological:  Negative for adenopathy. All other systems reviewed and are negative. Objective   Vitals:    09/12/22 0844   BP: 124/76   Pulse: (!) 102   Resp: 16   SpO2: 99%      Physical Exam  Constitutional:       General: She is not in acute distress. Appearance: Normal appearance. HENT:      Head: Normocephalic and atraumatic. Right Ear: External ear normal.      Left Ear: External ear normal.      Nose: Nose normal.   Eyes:      General: No scleral icterus. Pulmonary:      Effort: Pulmonary effort is normal.   Musculoskeletal:         General: No swelling, tenderness or deformity. Comments: She has what is likely more synovial hypertrophy than active synovitis in the second and third MCP joints on the right. There is no obvious active synovitis elsewhere on exam.   Skin:     General: Skin is warm and dry. Findings: No rash. Neurological:      General: No focal deficit present. Mental Status: She is alert and oriented to person, place, and time. Mental status is at baseline.    Psychiatric:         Mood and Affect: Mood normal.         Behavior: Behavior normal.          Lab Results   Component Value Date    WBC 7.7 07/26/2022    HGB 14.5 07/26/2022    HCT 44.6 07/26/2022    MCV 92.0 07/26/2022     07/26/2022     Lab Results   Component Value Date     07/26/2022    K 3.5 07/26/2022     07/26/2022    CO2 23 07/26/2022    BUN 5 (L) 07/26/2022    CREATININE 0.5 07/26/2022    GLUCOSE 140 (H) 07/26/2022    CALCIUM 9.4 07/26/2022    PROT 7.7 07/26/2022    LABALBU 4.2 07/26/2022    BILITOT 0.4 07/26/2022    ALKPHOS 135 (H) 07/26/2022    AST 17 07/26/2022    ALT 9 07/26/2022    LABGLOM >60 07/26/2022    GFRAA >60 07/26/2022     Lab Results   Component Value Date    TERE POSITIVE (A) 06/01/2016     No results found for: RHEUMFACTOR  Lab Results   Component Value Date    SEDRATE 59 (H) 06/02/2022     Lab Results   Component Value Date    CRP 2.7 (H) 06/02/2022            An electronic signature was used to authenticate this note. This note was generated with a voice recognition dictation system. Please disregard any errors or omission which have escaped my review.     --Frances Caban, DO

## 2022-10-14 DIAGNOSIS — F41.9 ANXIETY: ICD-10-CM

## 2022-10-14 DIAGNOSIS — F32.A DEPRESSION, UNSPECIFIED DEPRESSION TYPE: ICD-10-CM

## 2022-10-14 RX ORDER — SERTRALINE HYDROCHLORIDE 25 MG/1
25 TABLET, FILM COATED ORAL DAILY
Qty: 7 TABLET | Refills: 0 | Status: SHIPPED
Start: 2022-10-14 | End: 2022-10-17 | Stop reason: SDUPTHER

## 2022-10-17 ENCOUNTER — OFFICE VISIT (OUTPATIENT)
Dept: FAMILY MEDICINE CLINIC | Age: 52
End: 2022-10-17
Payer: COMMERCIAL

## 2022-10-17 VITALS
HEIGHT: 62 IN | OXYGEN SATURATION: 98 % | HEART RATE: 78 BPM | TEMPERATURE: 98.2 F | BODY MASS INDEX: 25.54 KG/M2 | DIASTOLIC BLOOD PRESSURE: 70 MMHG | SYSTOLIC BLOOD PRESSURE: 118 MMHG | WEIGHT: 138.8 LBS | RESPIRATION RATE: 18 BRPM

## 2022-10-17 DIAGNOSIS — F41.9 ANXIETY: ICD-10-CM

## 2022-10-17 DIAGNOSIS — F32.A DEPRESSION, UNSPECIFIED DEPRESSION TYPE: ICD-10-CM

## 2022-10-17 DIAGNOSIS — M05.79 RHEUMATOID ARTHRITIS INVOLVING MULTIPLE SITES WITH POSITIVE RHEUMATOID FACTOR (HCC): ICD-10-CM

## 2022-10-17 PROCEDURE — 99213 OFFICE O/P EST LOW 20 MIN: CPT | Performed by: NURSE PRACTITIONER

## 2022-10-17 RX ORDER — SERTRALINE HYDROCHLORIDE 25 MG/1
25 TABLET, FILM COATED ORAL DAILY
Qty: 30 TABLET | Refills: 0 | Status: SHIPPED | OUTPATIENT
Start: 2022-10-17

## 2022-10-17 RX ORDER — HYDROCODONE BITARTRATE AND ACETAMINOPHEN 5; 325 MG/1; MG/1
1 TABLET ORAL EVERY 8 HOURS PRN
Qty: 90 TABLET | Refills: 0 | Status: SHIPPED | OUTPATIENT
Start: 2022-10-17 | End: 2022-11-16

## 2022-10-17 SDOH — ECONOMIC STABILITY: FOOD INSECURITY: WITHIN THE PAST 12 MONTHS, YOU WORRIED THAT YOUR FOOD WOULD RUN OUT BEFORE YOU GOT MONEY TO BUY MORE.: NEVER TRUE

## 2022-10-17 SDOH — ECONOMIC STABILITY: FOOD INSECURITY: WITHIN THE PAST 12 MONTHS, THE FOOD YOU BOUGHT JUST DIDN'T LAST AND YOU DIDN'T HAVE MONEY TO GET MORE.: NEVER TRUE

## 2022-10-17 ASSESSMENT — ENCOUNTER SYMPTOMS
VOMITING: 0
WHEEZING: 0
SHORTNESS OF BREATH: 0
COUGH: 0
DIARRHEA: 0
NAUSEA: 0
CONSTIPATION: 0

## 2022-10-17 ASSESSMENT — SOCIAL DETERMINANTS OF HEALTH (SDOH): HOW HARD IS IT FOR YOU TO PAY FOR THE VERY BASICS LIKE FOOD, HOUSING, MEDICAL CARE, AND HEATING?: NOT HARD AT ALL

## 2022-10-17 NOTE — PROGRESS NOTES
Terrie Huang (:  1970) is a 47 y.o. female,Established patient, here for evaluation of the following chief complaint(s): Anxiety (Getting refills; medications helping)         ASSESSMENT/PLAN:  1. Anxiety  -     sertraline (ZOLOFT) 25 MG tablet; Take 1 tablet by mouth daily, Disp-30 tablet, R-0Normal  -call back in 10 to 14 days with update on symptoms  -do not abruptly stop taking medication    2. Depression, unspecified depression type  -     sertraline (ZOLOFT) 25 MG tablet; Take 1 tablet by mouth daily, Disp-30 tablet, R-0Normal  -call back in 10 to 14 days with update on symptoms  -do not abruptly stop taking medication    3. Rheumatoid arthritis involving multiple sites with positive rheumatoid factor (HCC)  -     HYDROcodone-acetaminophen (NORCO) 5-325 MG per tablet; Take 1 tablet by mouth every 8 hours as needed for Pain for up to 30 days. , Disp-90 tablet, R-0Normal  Prn only  Follow up with Dr Isabella Aldridge as scheduled    Controlled Substance Monitoring:    Acute and Chronic Pain Monitoring:   RX Monitoring 10/17/2022   Attestation -   Periodic Controlled Substance Monitoring No signs of potential drug abuse or diversion identified. Chronic Pain > 80 MEDD -           No follow-ups on file. Subjective   SUBJECTIVE/OBJECTIVE:  Complains of anxiety and depression. States normally controlled with zoloft. She did run out of zoloft. Complains of pain from RA. Following with rheumatology. Takes norco prn only. Review of Systems   Constitutional:  Negative for activity change, appetite change, fatigue and unexpected weight change. Respiratory:  Negative for cough, shortness of breath and wheezing. Cardiovascular:  Negative for chest pain and palpitations. Gastrointestinal:  Negative for constipation, diarrhea, nausea and vomiting. Musculoskeletal:  Positive for arthralgias and joint swelling. Neurological:  Negative for weakness, light-headedness and headaches. Psychiatric/Behavioral:  Positive for dysphoric mood. Negative for sleep disturbance. The patient is nervous/anxious. Objective   /70   Pulse 78   Temp 98.2 °F (36.8 °C) (Temporal)   Resp 18   Ht 5' 2\" (1.575 m)   Wt 138 lb 12.8 oz (63 kg)   LMP  (LMP Unknown)   SpO2 98%   BMI 25.39 kg/m²    Physical Exam  Constitutional:       General: She is not in acute distress. Appearance: Normal appearance. She is well-developed. HENT:      Head: Normocephalic and atraumatic. Neck:      Thyroid: No thyromegaly. Trachea: No tracheal deviation. Cardiovascular:      Rate and Rhythm: Normal rate and regular rhythm. Heart sounds: Normal heart sounds. No murmur heard. Pulmonary:      Effort: Pulmonary effort is normal. No respiratory distress. Breath sounds: Normal breath sounds. No wheezing, rhonchi or rales. Chest:      Chest wall: No tenderness. Abdominal:      General: Bowel sounds are normal.      Palpations: Abdomen is soft. Tenderness: There is no abdominal tenderness. Musculoskeletal:         General: Tenderness present. Lymphadenopathy:      Cervical: No cervical adenopathy. Skin:     General: Skin is warm and dry. Neurological:      Mental Status: She is alert and oriented to person, place, and time. Psychiatric:         Mood and Affect: Mood normal.         Behavior: Behavior normal.          [facility] anastasia      An electronic signature was used to authenticate this note.     --Jaleesa Wu, APRN - CNP

## 2023-02-10 DIAGNOSIS — F32.A DEPRESSION, UNSPECIFIED DEPRESSION TYPE: ICD-10-CM

## 2023-02-10 DIAGNOSIS — F41.9 ANXIETY: ICD-10-CM

## 2023-02-10 RX ORDER — SERTRALINE HYDROCHLORIDE 25 MG/1
25 TABLET, FILM COATED ORAL DAILY
Qty: 30 TABLET | Refills: 2 | Status: SHIPPED | OUTPATIENT
Start: 2023-02-10

## 2023-02-17 ENCOUNTER — TELEPHONE (OUTPATIENT)
Dept: FAMILY MEDICINE CLINIC | Age: 53
End: 2023-02-17

## 2023-02-17 DIAGNOSIS — M05.79 RHEUMATOID ARTHRITIS INVOLVING MULTIPLE SITES WITH POSITIVE RHEUMATOID FACTOR (HCC): ICD-10-CM

## 2023-02-17 RX ORDER — METHYLPREDNISOLONE 4 MG/1
TABLET ORAL
Qty: 1 KIT | Refills: 1 | Status: SHIPPED | OUTPATIENT
Start: 2023-02-17

## 2023-02-22 DIAGNOSIS — M05.79 RHEUMATOID ARTHRITIS INVOLVING MULTIPLE SITES WITH POSITIVE RHEUMATOID FACTOR (HCC): ICD-10-CM

## 2023-02-23 RX ORDER — HYDROXYCHLOROQUINE SULFATE 200 MG/1
TABLET, FILM COATED ORAL
Qty: 45 TABLET | Refills: 11 | Status: SHIPPED | OUTPATIENT
Start: 2023-02-23

## 2023-05-08 ENCOUNTER — OFFICE VISIT (OUTPATIENT)
Dept: FAMILY MEDICINE CLINIC | Age: 53
End: 2023-05-08
Payer: COMMERCIAL

## 2023-05-08 VITALS
DIASTOLIC BLOOD PRESSURE: 68 MMHG | OXYGEN SATURATION: 98 % | BODY MASS INDEX: 24.99 KG/M2 | WEIGHT: 135.8 LBS | RESPIRATION RATE: 16 BRPM | TEMPERATURE: 97.6 F | SYSTOLIC BLOOD PRESSURE: 124 MMHG | HEART RATE: 98 BPM | HEIGHT: 62 IN

## 2023-05-08 DIAGNOSIS — Z01.818 PRE-OP EXAM: Primary | ICD-10-CM

## 2023-05-08 DIAGNOSIS — M05.79 RHEUMATOID ARTHRITIS INVOLVING MULTIPLE SITES WITH POSITIVE RHEUMATOID FACTOR (HCC): ICD-10-CM

## 2023-05-08 PROCEDURE — 99214 OFFICE O/P EST MOD 30 MIN: CPT | Performed by: FAMILY MEDICINE

## 2023-05-08 PROCEDURE — 93000 ELECTROCARDIOGRAM COMPLETE: CPT | Performed by: FAMILY MEDICINE

## 2023-05-08 RX ORDER — HYDROCODONE BITARTRATE AND ACETAMINOPHEN 5; 325 MG/1; MG/1
1 TABLET ORAL EVERY 8 HOURS PRN
Qty: 90 TABLET | Refills: 0 | Status: SHIPPED | OUTPATIENT
Start: 2023-05-08 | End: 2023-06-07

## 2023-05-08 SDOH — ECONOMIC STABILITY: FOOD INSECURITY: WITHIN THE PAST 12 MONTHS, THE FOOD YOU BOUGHT JUST DIDN'T LAST AND YOU DIDN'T HAVE MONEY TO GET MORE.: NEVER TRUE

## 2023-05-08 SDOH — ECONOMIC STABILITY: INCOME INSECURITY: HOW HARD IS IT FOR YOU TO PAY FOR THE VERY BASICS LIKE FOOD, HOUSING, MEDICAL CARE, AND HEATING?: NOT HARD AT ALL

## 2023-05-08 SDOH — ECONOMIC STABILITY: HOUSING INSECURITY
IN THE LAST 12 MONTHS, WAS THERE A TIME WHEN YOU DID NOT HAVE A STEADY PLACE TO SLEEP OR SLEPT IN A SHELTER (INCLUDING NOW)?: NO

## 2023-05-08 SDOH — ECONOMIC STABILITY: FOOD INSECURITY: WITHIN THE PAST 12 MONTHS, YOU WORRIED THAT YOUR FOOD WOULD RUN OUT BEFORE YOU GOT MONEY TO BUY MORE.: NEVER TRUE

## 2023-05-08 ASSESSMENT — ENCOUNTER SYMPTOMS
NAUSEA: 0
COUGH: 0
CONSTIPATION: 0
ABDOMINAL PAIN: 0
BLOOD IN STOOL: 0
SHORTNESS OF BREATH: 0
VOMITING: 0
WHEEZING: 0
DIARRHEA: 0

## 2023-05-08 ASSESSMENT — PATIENT HEALTH QUESTIONNAIRE - PHQ9
10. IF YOU CHECKED OFF ANY PROBLEMS, HOW DIFFICULT HAVE THESE PROBLEMS MADE IT FOR YOU TO DO YOUR WORK, TAKE CARE OF THINGS AT HOME, OR GET ALONG WITH OTHER PEOPLE: 0
SUM OF ALL RESPONSES TO PHQ QUESTIONS 1-9: 1
5. POOR APPETITE OR OVEREATING: 0
SUM OF ALL RESPONSES TO PHQ9 QUESTIONS 1 & 2: 0
7. TROUBLE CONCENTRATING ON THINGS, SUCH AS READING THE NEWSPAPER OR WATCHING TELEVISION: 0
2. FEELING DOWN, DEPRESSED OR HOPELESS: 0
SUM OF ALL RESPONSES TO PHQ QUESTIONS 1-9: 1
SUM OF ALL RESPONSES TO PHQ QUESTIONS 1-9: 1
8. MOVING OR SPEAKING SO SLOWLY THAT OTHER PEOPLE COULD HAVE NOTICED. OR THE OPPOSITE, BEING SO FIGETY OR RESTLESS THAT YOU HAVE BEEN MOVING AROUND A LOT MORE THAN USUAL: 0
9. THOUGHTS THAT YOU WOULD BE BETTER OFF DEAD, OR OF HURTING YOURSELF: 0
6. FEELING BAD ABOUT YOURSELF - OR THAT YOU ARE A FAILURE OR HAVE LET YOURSELF OR YOUR FAMILY DOWN: 0
1. LITTLE INTEREST OR PLEASURE IN DOING THINGS: 0
SUM OF ALL RESPONSES TO PHQ QUESTIONS 1-9: 1
4. FEELING TIRED OR HAVING LITTLE ENERGY: 1
3. TROUBLE FALLING OR STAYING ASLEEP: 0

## 2023-05-08 NOTE — PROGRESS NOTES
Cardiovascular:      Heart sounds:     No gallop. Pulmonary:      Effort: No respiratory distress. Breath sounds: No wheezing. Musculoskeletal:         General: No tenderness. Skin:     Findings: No erythema. Neurological:      Deep Tendon Reflexes: Reflexes normal.          On this date 5/8/2023 I have spent 24 minutes reviewing previous notes, test results and face to face with the patient discussing the diagnosis and importance of compliance with the treatment plan as well as documenting on the day of the visit. Daisy Browne is medically cleared for Right [facility]. An electronic signature was used to authenticate this note.     --Claressa Dose, DO

## 2023-05-17 ENCOUNTER — TELEPHONE (OUTPATIENT)
Dept: FAMILY MEDICINE CLINIC | Age: 53
End: 2023-05-17

## 2023-05-17 DIAGNOSIS — Z85.118 HISTORY OF LUNG CANCER: Primary | ICD-10-CM

## 2023-05-17 DIAGNOSIS — M25.50 ARTHRALGIA, UNSPECIFIED JOINT: ICD-10-CM

## 2023-05-17 RX ORDER — OXYCODONE HYDROCHLORIDE AND ACETAMINOPHEN 5; 325 MG/1; MG/1
1 TABLET ORAL EVERY 8 HOURS PRN
Qty: 28 TABLET | Refills: 0 | Status: SHIPPED | OUTPATIENT
Start: 2023-05-17 | End: 2023-08-15

## 2023-05-17 NOTE — TELEPHONE ENCOUNTER
ASSESSMENT/PLAN:    1. History of lung cancer  - oxyCODONE-acetaminophen (PERCOCET) 5-325 MG per tablet; Take 1 tablet by mouth every 8 hours as needed for Pain for up to 90 days. Intended supply: 7 days. Take lowest dose possible to manage pain Max Daily Amount: 3 tablets  Dispense: 28 tablet; Refill: 0    2. Arthralgia, unspecified joint  - oxyCODONE-acetaminophen (PERCOCET) 5-325 MG per tablet; Take 1 tablet by mouth every 8 hours as needed for Pain for up to 90 days. Intended supply: 7 days. Take lowest dose possible to manage pain Max Daily Amount: 3 tablets  Dispense: 28 tablet; Refill: 0        FOLLOW-UP:  Controlled substances monitoring: no signs of potential drug abuse or diversion identified and [place] report reviewed today- activity consistent with treatment plan. Sallyanne Chain

## 2023-05-17 NOTE — TELEPHONE ENCOUNTER
Norco on backorder please send comparable medication to [facility] is TID #90 for 30 days

## 2023-05-31 ENCOUNTER — TELEPHONE (OUTPATIENT)
Dept: FAMILY MEDICINE CLINIC | Age: 53
End: 2023-05-31

## 2023-06-21 ENCOUNTER — PATIENT MESSAGE (OUTPATIENT)
Dept: FAMILY MEDICINE CLINIC | Age: 53
End: 2023-06-21

## 2023-06-21 NOTE — TELEPHONE ENCOUNTER
Rudolph Morrison LPN 6/97/4195 2:89 PM EDT      ----- Message -----  From: Stephanie Virk  Sent: 6/21/2023 2:19 PM EDT  To: [facility] Clinical Staff  Subject: Prescription     Hi Raquel Irvingcandace, I was having problems getting my notch filled cause they were out of stock . Can I get another prescription sent over. Please call me when you have time.  [phone]

## 2023-06-22 ENCOUNTER — OFFICE VISIT (OUTPATIENT)
Dept: FAMILY MEDICINE CLINIC | Age: 53
End: 2023-06-22
Payer: COMMERCIAL

## 2023-06-22 VITALS
TEMPERATURE: 97.2 F | HEIGHT: 62 IN | SYSTOLIC BLOOD PRESSURE: 108 MMHG | HEART RATE: 104 BPM | WEIGHT: 134.2 LBS | BODY MASS INDEX: 24.69 KG/M2 | RESPIRATION RATE: 16 BRPM | DIASTOLIC BLOOD PRESSURE: 58 MMHG | OXYGEN SATURATION: 97 %

## 2023-06-22 DIAGNOSIS — M05.79 RHEUMATOID ARTHRITIS INVOLVING MULTIPLE SITES WITH POSITIVE RHEUMATOID FACTOR (HCC): Primary | ICD-10-CM

## 2023-06-22 DIAGNOSIS — D84.9 IMMUNOSUPPRESSION (HCC): ICD-10-CM

## 2023-06-22 DIAGNOSIS — M25.50 ARTHRALGIA, UNSPECIFIED JOINT: ICD-10-CM

## 2023-06-22 DIAGNOSIS — Z85.118 HISTORY OF LUNG CANCER: ICD-10-CM

## 2023-06-22 DIAGNOSIS — F33.42 RECURRENT MAJOR DEPRESSIVE DISORDER, IN FULL REMISSION (HCC): ICD-10-CM

## 2023-06-22 PROBLEM — F33.9 MAJOR DEPRESSIVE DISORDER, RECURRENT, UNSPECIFIED (HCC): Status: ACTIVE | Noted: 2023-06-22

## 2023-06-22 PROBLEM — F33.0 MAJOR DEPRESSIVE DISORDER, RECURRENT, MILD (HCC): Status: ACTIVE | Noted: 2023-06-22

## 2023-06-22 PROBLEM — F33.1 MAJOR DEPRESSIVE DISORDER, RECURRENT, MODERATE (HCC): Status: ACTIVE | Noted: 2023-06-22

## 2023-06-22 PROCEDURE — 99213 OFFICE O/P EST LOW 20 MIN: CPT | Performed by: NURSE PRACTITIONER

## 2023-06-22 RX ORDER — HYDROCODONE BITARTRATE AND ACETAMINOPHEN 5; 325 MG/1; MG/1
1 TABLET ORAL EVERY 8 HOURS PRN
Qty: 90 TABLET | Refills: 0 | Status: CANCELLED | OUTPATIENT
Start: 2023-06-22 | End: 2023-07-22

## 2023-06-22 RX ORDER — HYDROCODONE BITARTRATE AND ACETAMINOPHEN 10; 325 MG/1; MG/1
TABLET ORAL
COMMUNITY
Start: 2023-06-12 | End: 2023-06-23 | Stop reason: SDUPTHER

## 2023-06-22 RX ORDER — HYDROCODONE BITARTRATE AND ACETAMINOPHEN 10; 325 MG/1; MG/1
1 TABLET ORAL EVERY 8 HOURS PRN
Qty: 90 TABLET | Refills: 0 | Status: CANCELLED | OUTPATIENT
Start: 2023-06-22 | End: 2023-07-22

## 2023-06-22 RX ORDER — OXYCODONE HYDROCHLORIDE AND ACETAMINOPHEN 5; 325 MG/1; MG/1
1 TABLET ORAL EVERY 8 HOURS PRN
Qty: 28 TABLET | Refills: 0 | Status: CANCELLED | OUTPATIENT
Start: 2023-06-22 | End: 2023-09-20

## 2023-06-22 ASSESSMENT — ENCOUNTER SYMPTOMS
DIARRHEA: 0
NAUSEA: 0
COUGH: 0
CONSTIPATION: 0
WHEEZING: 0
BACK PAIN: 0
VOMITING: 0
SHORTNESS OF BREATH: 0

## 2023-06-22 ASSESSMENT — PATIENT HEALTH QUESTIONNAIRE - PHQ9
SUM OF ALL RESPONSES TO PHQ QUESTIONS 1-9: 0
2. FEELING DOWN, DEPRESSED OR HOPELESS: 0
SUM OF ALL RESPONSES TO PHQ QUESTIONS 1-9: 0
1. LITTLE INTEREST OR PLEASURE IN DOING THINGS: 0
SUM OF ALL RESPONSES TO PHQ9 QUESTIONS 1 & 2: 0

## 2023-06-23 DIAGNOSIS — M05.79 RHEUMATOID ARTHRITIS INVOLVING MULTIPLE SITES WITH POSITIVE RHEUMATOID FACTOR (HCC): Primary | ICD-10-CM

## 2023-06-23 RX ORDER — HYDROCODONE BITARTRATE AND ACETAMINOPHEN 10; 325 MG/1; MG/1
TABLET ORAL
Qty: 45 TABLET | Refills: 0 | Status: SHIPPED | OUTPATIENT
Start: 2023-06-23 | End: 2023-07-23

## 2023-06-23 NOTE — TELEPHONE ENCOUNTER
Requested Prescriptions     Pending Prescriptions Disp Refills    HYDROcodone-acetaminophen (NORCO)  MG per tablet 90 tablet 0     Sig: TAKE 1 TABLET BY MOUTH EVERY 8 HOURS AS NEEDED FOR Pain for up to 30 days.        Next appt is Visit date not found  Last appt was 6/22/2023

## 2023-06-27 PROBLEM — F33.1 MAJOR DEPRESSIVE DISORDER, RECURRENT, MODERATE (HCC): Status: RESOLVED | Noted: 2023-06-22 | Resolved: 2023-06-27

## 2023-06-27 PROBLEM — F33.9 MAJOR DEPRESSIVE DISORDER, RECURRENT, UNSPECIFIED (HCC): Status: RESOLVED | Noted: 2023-06-22 | Resolved: 2023-06-27

## 2023-06-27 PROBLEM — F33.0 MAJOR DEPRESSIVE DISORDER, RECURRENT, MILD (HCC): Status: RESOLVED | Noted: 2023-06-22 | Resolved: 2023-06-27

## 2023-08-01 ENCOUNTER — OFFICE VISIT (OUTPATIENT)
Dept: FAMILY MEDICINE CLINIC | Age: 53
End: 2023-08-01
Payer: COMMERCIAL

## 2023-08-01 VITALS
HEART RATE: 85 BPM | RESPIRATION RATE: 16 BRPM | WEIGHT: 140 LBS | OXYGEN SATURATION: 97 % | HEIGHT: 62 IN | SYSTOLIC BLOOD PRESSURE: 100 MMHG | BODY MASS INDEX: 25.76 KG/M2 | DIASTOLIC BLOOD PRESSURE: 78 MMHG

## 2023-08-01 DIAGNOSIS — R19.7 DIARRHEA, UNSPECIFIED TYPE: Primary | ICD-10-CM

## 2023-08-01 DIAGNOSIS — F41.9 ANXIETY: ICD-10-CM

## 2023-08-01 DIAGNOSIS — M05.79 RHEUMATOID ARTHRITIS INVOLVING MULTIPLE SITES WITH POSITIVE RHEUMATOID FACTOR (HCC): ICD-10-CM

## 2023-08-01 DIAGNOSIS — F32.A DEPRESSION, UNSPECIFIED DEPRESSION TYPE: ICD-10-CM

## 2023-08-01 PROCEDURE — 99214 OFFICE O/P EST MOD 30 MIN: CPT | Performed by: NURSE PRACTITIONER

## 2023-08-01 RX ORDER — HYDROCODONE BITARTRATE AND ACETAMINOPHEN 5; 325 MG/1; MG/1
1 TABLET ORAL EVERY 8 HOURS PRN
Qty: 90 TABLET | Refills: 0 | Status: SHIPPED | OUTPATIENT
Start: 2023-08-01 | End: 2023-08-31

## 2023-08-01 RX ORDER — SERTRALINE HYDROCHLORIDE 25 MG/1
25 TABLET, FILM COATED ORAL DAILY
Qty: 30 TABLET | Refills: 5 | Status: SHIPPED | OUTPATIENT
Start: 2023-08-01

## 2023-08-01 RX ORDER — DIPHENOXYLATE HYDROCHLORIDE AND ATROPINE SULFATE 2.5; .025 MG/1; MG/1
1 TABLET ORAL 4 TIMES DAILY PRN
Qty: 40 TABLET | Refills: 0 | Status: SHIPPED | OUTPATIENT
Start: 2023-08-01 | End: 2023-08-11

## 2023-08-01 ASSESSMENT — ENCOUNTER SYMPTOMS
WHEEZING: 0
SHORTNESS OF BREATH: 0
VOMITING: 0
NAUSEA: 0
COUGH: 0
CONSTIPATION: 1
DIARRHEA: 1
BACK PAIN: 0

## 2023-09-25 ENCOUNTER — OFFICE VISIT (OUTPATIENT)
Dept: FAMILY MEDICINE CLINIC | Age: 53
End: 2023-09-25
Payer: COMMERCIAL

## 2023-09-25 VITALS
SYSTOLIC BLOOD PRESSURE: 118 MMHG | DIASTOLIC BLOOD PRESSURE: 76 MMHG | TEMPERATURE: 98.2 F | OXYGEN SATURATION: 98 % | BODY MASS INDEX: 24.51 KG/M2 | HEIGHT: 62 IN | HEART RATE: 78 BPM | RESPIRATION RATE: 14 BRPM | WEIGHT: 133.2 LBS

## 2023-09-25 DIAGNOSIS — F33.0 MAJOR DEPRESSIVE DISORDER, RECURRENT, MILD (HCC): Primary | ICD-10-CM

## 2023-09-25 DIAGNOSIS — M05.79 RHEUMATOID ARTHRITIS INVOLVING MULTIPLE SITES WITH POSITIVE RHEUMATOID FACTOR (HCC): ICD-10-CM

## 2023-09-25 PROCEDURE — 99213 OFFICE O/P EST LOW 20 MIN: CPT | Performed by: NURSE PRACTITIONER

## 2023-09-25 RX ORDER — HYDROCODONE BITARTRATE AND ACETAMINOPHEN 5; 325 MG/1; MG/1
1 TABLET ORAL EVERY 8 HOURS PRN
Qty: 90 TABLET | Refills: 0 | Status: SHIPPED | OUTPATIENT
Start: 2023-09-25 | End: 2023-10-25

## 2023-09-25 ASSESSMENT — ENCOUNTER SYMPTOMS
NAUSEA: 0
COUGH: 0
CONSTIPATION: 0
SHORTNESS OF BREATH: 0
VOMITING: 0
WHEEZING: 0
DIARRHEA: 1

## 2023-09-25 ASSESSMENT — PATIENT HEALTH QUESTIONNAIRE - PHQ9
7. TROUBLE CONCENTRATING ON THINGS, SUCH AS READING THE NEWSPAPER OR WATCHING TELEVISION: 0
SUM OF ALL RESPONSES TO PHQ QUESTIONS 1-9: 0
SUM OF ALL RESPONSES TO PHQ9 QUESTIONS 1 & 2: 0
10. IF YOU CHECKED OFF ANY PROBLEMS, HOW DIFFICULT HAVE THESE PROBLEMS MADE IT FOR YOU TO DO YOUR WORK, TAKE CARE OF THINGS AT HOME, OR GET ALONG WITH OTHER PEOPLE: 0
2. FEELING DOWN, DEPRESSED OR HOPELESS: 0
SUM OF ALL RESPONSES TO PHQ QUESTIONS 1-9: 5
SUM OF ALL RESPONSES TO PHQ QUESTIONS 1-9: 0
4. FEELING TIRED OR HAVING LITTLE ENERGY: 3
SUM OF ALL RESPONSES TO PHQ QUESTIONS 1-9: 5
1. LITTLE INTEREST OR PLEASURE IN DOING THINGS: 0
SUM OF ALL RESPONSES TO PHQ QUESTIONS 1-9: 5
3. TROUBLE FALLING OR STAYING ASLEEP: 2
SUM OF ALL RESPONSES TO PHQ QUESTIONS 1-9: 0
2. FEELING DOWN, DEPRESSED OR HOPELESS: 0
SUM OF ALL RESPONSES TO PHQ9 QUESTIONS 1 & 2: 0
1. LITTLE INTEREST OR PLEASURE IN DOING THINGS: 0
6. FEELING BAD ABOUT YOURSELF - OR THAT YOU ARE A FAILURE OR HAVE LET YOURSELF OR YOUR FAMILY DOWN: 0
SUM OF ALL RESPONSES TO PHQ QUESTIONS 1-9: 0
5. POOR APPETITE OR OVEREATING: 0
SUM OF ALL RESPONSES TO PHQ QUESTIONS 1-9: 5
9. THOUGHTS THAT YOU WOULD BE BETTER OFF DEAD, OR OF HURTING YOURSELF: 0
8. MOVING OR SPEAKING SO SLOWLY THAT OTHER PEOPLE COULD HAVE NOTICED. OR THE OPPOSITE, BEING SO FIGETY OR RESTLESS THAT YOU HAVE BEEN MOVING AROUND A LOT MORE THAN USUAL: 0

## 2023-09-25 NOTE — PROGRESS NOTES
Rafia Greenwood (:  1970) is a 47 y.o. female,Established patient, here for evaluation of the following chief complaint(s): Anxiety (Better since last visit), Depression (Better since ast visit), Pain (Needs pain medication for rheumatoid arthritis), and Health Maintenance (Open mammo order due anytime)         ASSESSMENT/PLAN:  1. Major depressive disorder, recurrent, mild  The current medical regimen is effective;  continue present plan and medications. If symptoms worsen will call for dose adjustment of zoloft    2. Rheumatoid arthritis involving multiple sites with positive rheumatoid factor (HCC)  -     HYDROcodone-acetaminophen (NORCO) 5-325 MG per tablet; Take 1 tablet by mouth every 8 hours as needed for Pain for up to 30 days. , Disp-90 tablet, R-0Normal    Controlled Substance Monitoring:    Acute and Chronic Pain Monitoring:   RX Monitoring Periodic Controlled Substance Monitoring   2023   9:53 AM No signs of potential drug abuse or diversion identified. No follow-ups on file. Subjective   SUBJECTIVE/OBJECTIVE:  Complains of anxiety and depression. States has multiple stressors. Some relief  with zoloft. Complains of generalized joint pain and swelling from RA.  she does see rheumatology. Works full time. Takes norco prn with good results        Review of Systems   Constitutional:  Negative for activity change, appetite change, fatigue and unexpected weight change. HENT:  Positive for sneezing. Respiratory:  Negative for cough, shortness of breath and wheezing. Cardiovascular:  Negative for chest pain and palpitations. Gastrointestinal:  Positive for diarrhea (chronic). Negative for constipation, nausea and vomiting. Musculoskeletal:  Positive for arthralgias and joint swelling. Neurological:  Positive for headaches (occasional). Negative for weakness and light-headedness. Psychiatric/Behavioral:  Positive for dysphoric mood.  Negative for sleep

## 2023-10-05 ENCOUNTER — TELEPHONE (OUTPATIENT)
Dept: FAMILY MEDICINE CLINIC | Age: 53
End: 2023-10-05

## 2024-01-09 RX ORDER — PREDNISONE 5 MG/1
2.5 TABLET ORAL DAILY
Qty: 30 TABLET | Refills: 0 | OUTPATIENT
Start: 2024-01-09

## 2024-01-29 DIAGNOSIS — F41.9 ANXIETY: ICD-10-CM

## 2024-01-29 DIAGNOSIS — F32.A DEPRESSION, UNSPECIFIED DEPRESSION TYPE: ICD-10-CM

## 2024-01-29 RX ORDER — SERTRALINE HYDROCHLORIDE 25 MG/1
25 TABLET, FILM COATED ORAL DAILY
Qty: 30 TABLET | Refills: 0 | Status: SHIPPED | OUTPATIENT
Start: 2024-01-29

## 2024-03-04 ENCOUNTER — OFFICE VISIT (OUTPATIENT)
Dept: FAMILY MEDICINE CLINIC | Age: 54
End: 2024-03-04
Payer: COMMERCIAL

## 2024-03-04 VITALS
SYSTOLIC BLOOD PRESSURE: 130 MMHG | RESPIRATION RATE: 16 BRPM | OXYGEN SATURATION: 98 % | BODY MASS INDEX: 25.03 KG/M2 | TEMPERATURE: 98.5 F | HEIGHT: 62 IN | HEART RATE: 109 BPM | WEIGHT: 136 LBS | DIASTOLIC BLOOD PRESSURE: 80 MMHG

## 2024-03-04 DIAGNOSIS — F41.9 ANXIETY: ICD-10-CM

## 2024-03-04 DIAGNOSIS — M05.79 RHEUMATOID ARTHRITIS INVOLVING MULTIPLE SITES WITH POSITIVE RHEUMATOID FACTOR (HCC): ICD-10-CM

## 2024-03-04 DIAGNOSIS — D84.9 IMMUNOSUPPRESSION (HCC): ICD-10-CM

## 2024-03-04 DIAGNOSIS — F33.0 MAJOR DEPRESSIVE DISORDER, RECURRENT, MILD (HCC): ICD-10-CM

## 2024-03-04 DIAGNOSIS — F32.A DEPRESSION, UNSPECIFIED DEPRESSION TYPE: ICD-10-CM

## 2024-03-04 PROCEDURE — 99214 OFFICE O/P EST MOD 30 MIN: CPT | Performed by: NURSE PRACTITIONER

## 2024-03-04 RX ORDER — HYDROCODONE BITARTRATE AND ACETAMINOPHEN 5; 325 MG/1; MG/1
1 TABLET ORAL EVERY 8 HOURS PRN
Qty: 90 TABLET | Refills: 0 | Status: SHIPPED | OUTPATIENT
Start: 2024-03-04 | End: 2024-04-03

## 2024-03-04 RX ORDER — SERTRALINE HYDROCHLORIDE 25 MG/1
25 TABLET, FILM COATED ORAL DAILY
Qty: 90 TABLET | Refills: 1 | Status: SHIPPED | OUTPATIENT
Start: 2024-03-04

## 2024-03-04 RX ORDER — PREDNISONE 10 MG/1
10 TABLET ORAL SEE ADMIN INSTRUCTIONS
Qty: 21 TABLET | Refills: 0 | Status: SHIPPED | OUTPATIENT
Start: 2024-03-04 | End: 2024-03-10

## 2024-03-04 ASSESSMENT — PATIENT HEALTH QUESTIONNAIRE - PHQ9
5. POOR APPETITE OR OVEREATING: 0
7. TROUBLE CONCENTRATING ON THINGS, SUCH AS READING THE NEWSPAPER OR WATCHING TELEVISION: 0
2. FEELING DOWN, DEPRESSED OR HOPELESS: 0
SUM OF ALL RESPONSES TO PHQ9 QUESTIONS 1 & 2: 1
4. FEELING TIRED OR HAVING LITTLE ENERGY: 3
SUM OF ALL RESPONSES TO PHQ QUESTIONS 1-9: 5
6. FEELING BAD ABOUT YOURSELF - OR THAT YOU ARE A FAILURE OR HAVE LET YOURSELF OR YOUR FAMILY DOWN: 0
SUM OF ALL RESPONSES TO PHQ QUESTIONS 1-9: 5
1. LITTLE INTEREST OR PLEASURE IN DOING THINGS: 1
10. IF YOU CHECKED OFF ANY PROBLEMS, HOW DIFFICULT HAVE THESE PROBLEMS MADE IT FOR YOU TO DO YOUR WORK, TAKE CARE OF THINGS AT HOME, OR GET ALONG WITH OTHER PEOPLE: 0
8. MOVING OR SPEAKING SO SLOWLY THAT OTHER PEOPLE COULD HAVE NOTICED. OR THE OPPOSITE, BEING SO FIGETY OR RESTLESS THAT YOU HAVE BEEN MOVING AROUND A LOT MORE THAN USUAL: 0
SUM OF ALL RESPONSES TO PHQ QUESTIONS 1-9: 5
SUM OF ALL RESPONSES TO PHQ QUESTIONS 1-9: 5
3. TROUBLE FALLING OR STAYING ASLEEP: 1
9. THOUGHTS THAT YOU WOULD BE BETTER OFF DEAD, OR OF HURTING YOURSELF: 0

## 2024-03-04 ASSESSMENT — ENCOUNTER SYMPTOMS
WHEEZING: 0
VOMITING: 0
NAUSEA: 0
DIARRHEA: 0
COUGH: 0
SHORTNESS OF BREATH: 0
CONSTIPATION: 0

## 2024-03-04 NOTE — PROGRESS NOTES
Graciela Aguilera (:  1970) is a 53 y.o. female,Established patient, here for evaluation of the following chief complaint(s):  Arthritis (Med Refill: Requesting steroid for rheumatoid flare)         ASSESSMENT/PLAN:  1. Anxiety  -     sertraline (ZOLOFT) 25 MG tablet; Take 1 tablet by mouth daily, Disp-90 tablet, R-1Normal  The current medical regimen is effective;  continue present plan and medications.    2. Depression, unspecified depression type  -     sertraline (ZOLOFT) 25 MG tablet; Take 1 tablet by mouth daily, Disp-90 tablet, R-1Normal  The current medical regimen is effective;  continue present plan and medications.    3. Immunosuppression (HCC)  Stable, The current medical regimen is effective;  continue present plan and medications.  Recheck in 1 year    4. Rheumatoid arthritis involving multiple sites with positive rheumatoid factor (HCC)  -     predniSONE (DELTASONE) 10 MG tablet; Take 1 tablet by mouth See Admin Instructions for 6 days TAPER 6,5,4,3,2,1, Disp-21 tablet, R-0Normal  -     HYDROcodone-acetaminophen (NORCO) 5-325 MG per tablet; Take 1 tablet by mouth every 8 hours as needed for Pain for up to 30 days., Disp-90 tablet, R-0Print  Current flare, follows with Dr Randhawa   Recheck in 6 months    5. Major depressive disorder, recurrent, mild (HCC)  Stable, The current medical regimen is effective;  continue present plan and medications.  Recheck in 6 months    Controlled Substance Monitoring:    Acute and Chronic Pain Monitoring:   RX Monitoring Periodic Controlled Substance Monitoring   3/4/2024   9:25 AM No signs of potential drug abuse or diversion identified.           No follow-ups on file.         Subjective   SUBJECTIVE/OBJECTIVE:  Complains of RA flare for past week. Generalized joint pain/swelling over past week.   States anxiety and depression are well controlled with current dose of zoloft        Review of Systems   Constitutional:  Positive for fatigue. Negative for activity

## 2024-04-16 ENCOUNTER — TELEPHONE (OUTPATIENT)
Dept: FAMILY MEDICINE CLINIC | Age: 54
End: 2024-04-16

## 2024-04-16 NOTE — TELEPHONE ENCOUNTER
This MA attempted to reach pt. No answer. This MA left message for pt requesting return call to office regarding scheduling her mammogram.     Electronically signed by ANASTASIIA VÁSQUEZ MA on 4/16/24 at 9:05 AM EDT

## 2024-05-21 ENCOUNTER — OFFICE VISIT (OUTPATIENT)
Dept: FAMILY MEDICINE CLINIC | Age: 54
End: 2024-05-21
Payer: COMMERCIAL

## 2024-05-21 VITALS
TEMPERATURE: 97 F | OXYGEN SATURATION: 97 % | HEART RATE: 88 BPM | RESPIRATION RATE: 12 BRPM | BODY MASS INDEX: 24.99 KG/M2 | WEIGHT: 135.8 LBS | SYSTOLIC BLOOD PRESSURE: 132 MMHG | DIASTOLIC BLOOD PRESSURE: 78 MMHG | HEIGHT: 62 IN

## 2024-05-21 DIAGNOSIS — R19.7 DIARRHEA, UNSPECIFIED TYPE: ICD-10-CM

## 2024-05-21 DIAGNOSIS — M05.79 RHEUMATOID ARTHRITIS INVOLVING MULTIPLE SITES WITH POSITIVE RHEUMATOID FACTOR (HCC): Primary | ICD-10-CM

## 2024-05-21 DIAGNOSIS — M05.79 RHEUMATOID ARTHRITIS INVOLVING MULTIPLE SITES WITH POSITIVE RHEUMATOID FACTOR (HCC): ICD-10-CM

## 2024-05-21 PROCEDURE — 99214 OFFICE O/P EST MOD 30 MIN: CPT | Performed by: NURSE PRACTITIONER

## 2024-05-21 RX ORDER — HYDROXYCHLOROQUINE SULFATE 200 MG/1
TABLET, FILM COATED ORAL
Qty: 45 TABLET | Refills: 11 | Status: SHIPPED | OUTPATIENT
Start: 2024-05-21

## 2024-05-21 RX ORDER — PREDNISONE 10 MG/1
10 TABLET ORAL SEE ADMIN INSTRUCTIONS
Qty: 21 TABLET | Refills: 0 | Status: SHIPPED | OUTPATIENT
Start: 2024-05-21 | End: 2024-05-27

## 2024-05-21 RX ORDER — HYDROCODONE BITARTRATE AND ACETAMINOPHEN 5; 325 MG/1; MG/1
1 TABLET ORAL EVERY 8 HOURS PRN
Qty: 90 TABLET | Refills: 0 | Status: SHIPPED | OUTPATIENT
Start: 2024-05-21 | End: 2024-06-20

## 2024-05-21 SDOH — ECONOMIC STABILITY: FOOD INSECURITY: WITHIN THE PAST 12 MONTHS, THE FOOD YOU BOUGHT JUST DIDN'T LAST AND YOU DIDN'T HAVE MONEY TO GET MORE.: NEVER TRUE

## 2024-05-21 SDOH — ECONOMIC STABILITY: INCOME INSECURITY: HOW HARD IS IT FOR YOU TO PAY FOR THE VERY BASICS LIKE FOOD, HOUSING, MEDICAL CARE, AND HEATING?: NOT HARD AT ALL

## 2024-05-21 SDOH — ECONOMIC STABILITY: FOOD INSECURITY: WITHIN THE PAST 12 MONTHS, YOU WORRIED THAT YOUR FOOD WOULD RUN OUT BEFORE YOU GOT MONEY TO BUY MORE.: NEVER TRUE

## 2024-05-21 ASSESSMENT — ENCOUNTER SYMPTOMS
COUGH: 0
VOMITING: 0
BACK PAIN: 0
NAUSEA: 0
SHORTNESS OF BREATH: 0
CONSTIPATION: 0
DIARRHEA: 1
WHEEZING: 0

## 2024-05-21 NOTE — PROGRESS NOTES
Graciela Aguilera (:  1970) is a 54 y.o. female,Established patient, here for evaluation of the following chief complaint(s):  Chronic Pain (Would like a prescription of prednisone)      Assessment & Plan   ASSESSMENT/PLAN:  1. Rheumatoid arthritis involving multiple sites with positive rheumatoid factor (HCC)  -     HYDROcodone-acetaminophen (NORCO) 5-325 MG per tablet; Take 1 tablet by mouth every 8 hours as needed for Pain for up to 30 days., Disp-90 tablet, R-0Normal  -     predniSONE (DELTASONE) 10 MG tablet; Take 1 tablet by mouth See Admin Instructions for 6 days TAPER 6,5,4,3,2,1, Disp-21 tablet, R-0Normal  - Reviewed side effects of medication and patient verbalizes understanding.   - Advised to call back directly if there are further questions, or if these symptoms fail to improve as anticipated or worsen.   2. Diarrhea, unspecified type  -  continue imodium as needed, lomotil for more serious sx  -  add fiber supplement to diet  -  continue yogurt, add probiotic supplement    Controlled Substance Monitoring:    Acute and Chronic Pain Monitoring:   RX Monitoring Periodic Controlled Substance Monitoring   2024   7:57 AM No signs of potential drug abuse or diversion identified.;Assessed functional status (ability to engage in work or other purposeful activities, the pain intensity and its interference with activities of daily living, quality of family life and social activities, and the physical activity);Obtaining appropriate analgesic effect of treatment.         Return if symptoms worsen or fail to improve, for med review.         Subjective   SUBJECTIVE/OBJECTIVE:  HPI  Here today for joint pain from RA. She will sometimes have swelling in her hands. Medication does well as needed, no side effects. Joints currently in a flare. Requesting steroids.      /78   Pulse 88   Temp 97 °F (36.1 °C)   Resp 12   Ht 1.575 m (5' 2\")   Wt 61.6 kg (135 lb 12.8 oz)   LMP  (LMP Unknown)   SpO2 97%

## 2024-06-26 ENCOUNTER — TELEPHONE (OUTPATIENT)
Dept: FAMILY MEDICINE CLINIC | Age: 54
End: 2024-06-26

## 2024-06-26 DIAGNOSIS — Z12.31 SCREENING MAMMOGRAM FOR BREAST CANCER: Primary | ICD-10-CM

## 2024-06-26 NOTE — TELEPHONE ENCOUNTER
This MA attempted to reach pt. No answer. This MA left message for pt requesting return call to office regarding scheduling her mammogram.     Electronically signed by ANASTASIIA VÁSQUEZ MA on 6/26/24 at 11:24 AM EDT

## 2024-06-27 NOTE — TELEPHONE ENCOUNTER
Pt scheduled on 7/2.     Electronically signed by ANASTASIIA VÁSQUEZ MA on 6/27/24 at 2:40 PM EDT

## 2024-06-27 NOTE — TELEPHONE ENCOUNTER
2nd attempt to reach out to pt. Message left for pt to call the office to schedule her mammogram.          Electronically signed by ANASTASIIA VÁSQUEZ MA on 6/27/24 at 1:57 PM EDT

## 2024-08-26 ENCOUNTER — OFFICE VISIT (OUTPATIENT)
Dept: FAMILY MEDICINE CLINIC | Age: 54
End: 2024-08-26
Payer: COMMERCIAL

## 2024-08-26 VITALS
DIASTOLIC BLOOD PRESSURE: 80 MMHG | TEMPERATURE: 97.6 F | HEIGHT: 62 IN | WEIGHT: 132 LBS | BODY MASS INDEX: 24.29 KG/M2 | OXYGEN SATURATION: 100 % | SYSTOLIC BLOOD PRESSURE: 132 MMHG | HEART RATE: 100 BPM

## 2024-08-26 DIAGNOSIS — M05.79 RHEUMATOID ARTHRITIS INVOLVING MULTIPLE SITES WITH POSITIVE RHEUMATOID FACTOR (HCC): ICD-10-CM

## 2024-08-26 PROCEDURE — 99214 OFFICE O/P EST MOD 30 MIN: CPT | Performed by: NURSE PRACTITIONER

## 2024-08-26 RX ORDER — PREDNISONE 10 MG/1
10 TABLET ORAL SEE ADMIN INSTRUCTIONS
Qty: 21 TABLET | Refills: 0 | Status: SHIPPED | OUTPATIENT
Start: 2024-08-26 | End: 2024-09-01

## 2024-08-26 RX ORDER — HYDROCODONE BITARTRATE AND ACETAMINOPHEN 5; 325 MG/1; MG/1
1 TABLET ORAL EVERY 8 HOURS PRN
Qty: 90 TABLET | Refills: 0 | Status: SHIPPED | OUTPATIENT
Start: 2024-08-26 | End: 2024-09-25

## 2024-08-26 ASSESSMENT — ENCOUNTER SYMPTOMS
VOMITING: 0
COUGH: 0
NAUSEA: 0
SHORTNESS OF BREATH: 0
DIARRHEA: 1
CONSTIPATION: 0
WHEEZING: 0

## 2024-08-26 NOTE — PROGRESS NOTES
Graciela Aguilera (:  1970) is a 54 y.o. female,Established patient, here for evaluation of the following chief complaint(s):  Back Pain (Med refills)      Assessment & Plan   ASSESSMENT/PLAN:  1. Rheumatoid arthritis involving multiple sites with positive rheumatoid factor (HCC)  -     HYDROcodone-acetaminophen (NORCO) 5-325 MG per tablet; Take 1 tablet by mouth every 8 hours as needed for Pain for up to 30 days., Disp-90 tablet, R-0Normal  -     predniSONE (DELTASONE) 10 MG tablet; Take 1 tablet by mouth See Admin Instructions for 6 days TAPER 6,5,4,3,2,1, Disp-21 tablet, R-0Normal  Currently having flare, prednisone taper ordered  Will contact rheumatology to see if can get on wait list  Call with new or worsening of symptoms    Controlled Substance Monitoring:    Acute and Chronic Pain Monitoring:   RX Monitoring Periodic Controlled Substance Monitoring   2024   3:05 PM No signs of potential drug abuse or diversion identified.           No follow-ups on file.         Subjective   SUBJECTIVE/OBJECTIVE:  Complains of arthritis flare.  Sees Dr Vega for rheumatology but not able to be seen until October.  Left hand numb at times also, wearing brace to bed.  Right hand/wrist very painful and swollen.  Toes are painful and swollen, wearing steel toes boots.  Taking plaquenil but no longer seems effective.         Review of Systems   Constitutional:  Positive for fatigue. Negative for activity change, appetite change and unexpected weight change.   Respiratory:  Negative for cough, shortness of breath and wheezing.    Cardiovascular:  Negative for chest pain and palpitations.   Gastrointestinal:  Positive for diarrhea (chronic). Negative for constipation, nausea and vomiting.   Musculoskeletal:  Positive for arthralgias and joint swelling.   Neurological:  Positive for headaches (occasional). Negative for weakness and light-headedness.          Objective   /80   Pulse 100   Temp 97.6 °F (36.4 °C)

## 2024-10-07 ENCOUNTER — OFFICE VISIT (OUTPATIENT)
Dept: RHEUMATOLOGY | Age: 54
End: 2024-10-07
Payer: COMMERCIAL

## 2024-10-07 VITALS
SYSTOLIC BLOOD PRESSURE: 159 MMHG | HEIGHT: 62 IN | DIASTOLIC BLOOD PRESSURE: 96 MMHG | BODY MASS INDEX: 23.92 KG/M2 | HEART RATE: 99 BPM | WEIGHT: 130 LBS

## 2024-10-07 DIAGNOSIS — D84.9 IMMUNOSUPPRESSION (HCC): ICD-10-CM

## 2024-10-07 DIAGNOSIS — Z79.899 HIGH RISK MEDICATION USE: ICD-10-CM

## 2024-10-07 DIAGNOSIS — M05.79 RHEUMATOID ARTHRITIS INVOLVING MULTIPLE SITES WITH POSITIVE RHEUMATOID FACTOR (HCC): Primary | ICD-10-CM

## 2024-10-07 DIAGNOSIS — M05.79 RHEUMATOID ARTHRITIS INVOLVING MULTIPLE SITES WITH POSITIVE RHEUMATOID FACTOR (HCC): ICD-10-CM

## 2024-10-07 LAB
ALBUMIN: 4 G/DL (ref 3.5–5.2)
ALP BLD-CCNC: 108 U/L (ref 35–104)
ALT SERPL-CCNC: 9 U/L (ref 0–32)
ANION GAP SERPL CALCULATED.3IONS-SCNC: 9 MMOL/L (ref 7–16)
AST SERPL-CCNC: 15 U/L (ref 0–31)
BASOPHILS ABSOLUTE: 0.05 K/UL (ref 0–0.2)
BASOPHILS RELATIVE PERCENT: 1 % (ref 0–2)
BILIRUB SERPL-MCNC: 0.3 MG/DL (ref 0–1.2)
BUN BLDV-MCNC: 11 MG/DL (ref 6–20)
C-REACTIVE PROTEIN: 6 MG/L (ref 0–5)
CALCIUM SERPL-MCNC: 9.5 MG/DL (ref 8.6–10.2)
CHLORIDE BLD-SCNC: 107 MMOL/L (ref 98–107)
CO2: 23 MMOL/L (ref 22–29)
CREAT SERPL-MCNC: 0.7 MG/DL (ref 0.5–1)
EOSINOPHILS ABSOLUTE: 0.16 K/UL (ref 0.05–0.5)
EOSINOPHILS RELATIVE PERCENT: 3 % (ref 0–6)
GFR, ESTIMATED: >90 ML/MIN/1.73M2
GLUCOSE BLD-MCNC: 92 MG/DL (ref 74–99)
HCT VFR BLD CALC: 42 % (ref 34–48)
HEMOGLOBIN: 13.2 G/DL (ref 11.5–15.5)
IMMATURE GRANULOCYTES %: 0 % (ref 0–5)
IMMATURE GRANULOCYTES ABSOLUTE: <0.03 K/UL (ref 0–0.58)
LYMPHOCYTES ABSOLUTE: 1.05 K/UL (ref 1.5–4)
LYMPHOCYTES RELATIVE PERCENT: 17 % (ref 20–42)
MCH RBC QN AUTO: 27.4 PG (ref 26–35)
MCHC RBC AUTO-ENTMCNC: 31.4 G/DL (ref 32–34.5)
MCV RBC AUTO: 87.3 FL (ref 80–99.9)
MONOCYTES ABSOLUTE: 0.47 K/UL (ref 0.1–0.95)
MONOCYTES RELATIVE PERCENT: 8 % (ref 2–12)
NEUTROPHILS ABSOLUTE: 4.32 K/UL (ref 1.8–7.3)
NEUTROPHILS RELATIVE PERCENT: 71 % (ref 43–80)
PDW BLD-RTO: 15.9 % (ref 11.5–15)
PLATELET # BLD: 334 K/UL (ref 130–450)
PMV BLD AUTO: 11.3 FL (ref 7–12)
POTASSIUM SERPL-SCNC: 4.2 MMOL/L (ref 3.5–5)
RBC # BLD: 4.81 M/UL (ref 3.5–5.5)
SED RATE, AUTOMATED: 48 MM/HR (ref 0–20)
SODIUM BLD-SCNC: 139 MMOL/L (ref 132–146)
TOTAL PROTEIN: 7.9 G/DL (ref 6.4–8.3)
WBC # BLD: 6.1 K/UL (ref 4.5–11.5)

## 2024-10-07 PROCEDURE — 99215 OFFICE O/P EST HI 40 MIN: CPT | Performed by: INTERNAL MEDICINE

## 2024-10-07 PROCEDURE — G2211 COMPLEX E/M VISIT ADD ON: HCPCS | Performed by: INTERNAL MEDICINE

## 2024-10-07 RX ORDER — ADALIMUMAB 40MG/0.4ML
40 KIT SUBCUTANEOUS
Qty: 2 EACH | Refills: 3 | Status: SHIPPED | OUTPATIENT
Start: 2024-10-07

## 2024-10-07 RX ORDER — PREDNISONE 5 MG/1
15 TABLET ORAL DAILY
Qty: 90 TABLET | Refills: 1 | Status: SHIPPED | OUTPATIENT
Start: 2024-10-07

## 2024-10-07 ASSESSMENT — ENCOUNTER SYMPTOMS
ABDOMINAL PAIN: 0
COLOR CHANGE: 0
TROUBLE SWALLOWING: 0
NAUSEA: 0
DIARRHEA: 0
COUGH: 0
SHORTNESS OF BREATH: 0
VOMITING: 0

## 2024-10-07 NOTE — PROGRESS NOTES
Graciela Aguilera 1970 is a 54 y.o. female, here for evaluation of the following chief complaint(s):  Follow-up (Patient here for follow up on Rheumatoid Arthritis. )        Assessment & Plan   ASSESSMENT/PLAN:    Graciela Aguilera 1970 is a 54 y.o. female seen in follow-up for rheumatoid arthritis.    1.  RF positive, CCP positive rheumatoid arthritis-she was last seen over 2 years ago.  She is currently on just Plaquenil 300 mg daily and having severe exacerbation with diffuse synovitis throughout the hands and feet.  She had only partial response to leflunomide and it caused diarrhea.  She did not tolerate methotrexate either.  We will start her on Humira.  We discussed the risks, benefits, side effects.  Will update labs and x-rays as below.  We will also put her on a prednisone taper of 15 mg daily for 1 month, then 10 mg daily for 1 month, then 5 mg daily for 1 month, then stop.    2.  Immunosuppression-I recommend she stay up-to-date on vaccinations.  In the event of any acute infectious illness she should hold Humira.    3.  High risk medication use-we will update TB and hepatitis.  Will update basic blood work.  She will follow with the eye doctor regularly while on Plaquenil.    4.  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  No increased risk for solid tumors with TNF inhibitors.    1. Rheumatoid arthritis involving multiple sites with positive rheumatoid factor (HCC)  -     CBC with Auto Differential; Future  -     Comprehensive Metabolic Panel; Future  -     C-Reactive Protein; Future  -     Sedimentation Rate; Future  -     T-Spot TB Test; Future  -     Hepatitis B Core Antibody, Total; Future  -     Hepatitis B Surface Antibody; Future  -     Hepatitis B Surface Antigen; Future  -     Hepatitis C Antibody; Future  -     XR HAND LEFT (MIN 3 VIEWS); Future  -     XR HAND RIGHT (MIN 3 VIEWS); Future  -     XR FOOT LEFT (MIN 3 VIEWS);

## 2024-10-07 NOTE — PATIENT INSTRUCTIONS
No changes to plaquenil    Take prednisone 3 tabs daily for one month, then 2 tabs daily for one month, then one tab daily for one month, then stop    Start Humira injections every 14 days    Have Xrays

## 2024-10-08 LAB
HBV SURFACE AB TITR SER: <3.1 MIU/ML (ref 0–9.99)
HEP B S AGB SURF AG: NONREACTIVE
HEPATITIS C ANTIBODY: NONREACTIVE

## 2024-10-09 LAB — HEPATITIS B CORE TOTAL ANTIBODY: NONREACTIVE

## 2024-10-10 LAB — T SPOT TB TEST: NORMAL

## 2024-10-31 ENCOUNTER — TELEPHONE (OUTPATIENT)
Dept: PHARMACY | Facility: CLINIC | Age: 54
End: 2024-10-31

## 2024-10-31 NOTE — TELEPHONE ENCOUNTER
Called and left a detailed message on the patient's phone with the message information below.  Patient to call our office back if any questions.    Electronically signed by Funmi Dodd CMA on 10/31/2024 at 3:09 PM

## 2024-12-06 ENCOUNTER — OFFICE VISIT (OUTPATIENT)
Dept: FAMILY MEDICINE CLINIC | Age: 54
End: 2024-12-06

## 2024-12-06 VITALS
WEIGHT: 131 LBS | BODY MASS INDEX: 24.11 KG/M2 | DIASTOLIC BLOOD PRESSURE: 72 MMHG | SYSTOLIC BLOOD PRESSURE: 102 MMHG | HEIGHT: 62 IN | OXYGEN SATURATION: 97 % | HEART RATE: 112 BPM | RESPIRATION RATE: 16 BRPM | TEMPERATURE: 97.9 F

## 2024-12-06 DIAGNOSIS — R06.02 SOB (SHORTNESS OF BREATH): ICD-10-CM

## 2024-12-06 DIAGNOSIS — J32.9 SINOBRONCHITIS: ICD-10-CM

## 2024-12-06 DIAGNOSIS — J40 SINOBRONCHITIS: ICD-10-CM

## 2024-12-06 DIAGNOSIS — R09.81 CONGESTION OF NASAL SINUS: Primary | ICD-10-CM

## 2024-12-06 DIAGNOSIS — M05.79 RHEUMATOID ARTHRITIS INVOLVING MULTIPLE SITES WITH POSITIVE RHEUMATOID FACTOR (HCC): ICD-10-CM

## 2024-12-06 LAB
INFLUENZA A ANTIGEN, POC: NEGATIVE
INFLUENZA B ANTIGEN, POC: NEGATIVE
LOT EXPIRE DATE: NORMAL
LOT KIT NUMBER: NORMAL
SARS-COV-2, POC: NORMAL
VALID INTERNAL CONTROL: NORMAL
VENDOR AND KIT NAME POC: NORMAL

## 2024-12-06 RX ORDER — DOXYCYCLINE HYCLATE 100 MG
100 TABLET ORAL 2 TIMES DAILY
Qty: 20 TABLET | Refills: 0 | Status: SHIPPED | OUTPATIENT
Start: 2024-12-06 | End: 2024-12-16

## 2024-12-06 RX ORDER — ALBUTEROL SULFATE 90 UG/1
2 INHALANT RESPIRATORY (INHALATION) EVERY 6 HOURS PRN
Qty: 18 G | Refills: 5 | Status: SHIPPED | OUTPATIENT
Start: 2024-12-06

## 2024-12-06 RX ORDER — HYDROCODONE BITARTRATE AND ACETAMINOPHEN 5; 325 MG/1; MG/1
1 TABLET ORAL EVERY 8 HOURS PRN
Qty: 90 TABLET | Refills: 0 | Status: SHIPPED | OUTPATIENT
Start: 2024-12-06 | End: 2025-01-05

## 2024-12-06 ASSESSMENT — PATIENT HEALTH QUESTIONNAIRE - PHQ9
10. IF YOU CHECKED OFF ANY PROBLEMS, HOW DIFFICULT HAVE THESE PROBLEMS MADE IT FOR YOU TO DO YOUR WORK, TAKE CARE OF THINGS AT HOME, OR GET ALONG WITH OTHER PEOPLE: NOT DIFFICULT AT ALL
5. POOR APPETITE OR OVEREATING: NOT AT ALL
SUM OF ALL RESPONSES TO PHQ9 QUESTIONS 1 & 2: 0
9. THOUGHTS THAT YOU WOULD BE BETTER OFF DEAD, OR OF HURTING YOURSELF: NOT AT ALL
SUM OF ALL RESPONSES TO PHQ QUESTIONS 1-9: 0
4. FEELING TIRED OR HAVING LITTLE ENERGY: NOT AT ALL
3. TROUBLE FALLING OR STAYING ASLEEP: NOT AT ALL
SUM OF ALL RESPONSES TO PHQ QUESTIONS 1-9: 0
6. FEELING BAD ABOUT YOURSELF - OR THAT YOU ARE A FAILURE OR HAVE LET YOURSELF OR YOUR FAMILY DOWN: NOT AT ALL
2. FEELING DOWN, DEPRESSED OR HOPELESS: NOT AT ALL
7. TROUBLE CONCENTRATING ON THINGS, SUCH AS READING THE NEWSPAPER OR WATCHING TELEVISION: NOT AT ALL
SUM OF ALL RESPONSES TO PHQ QUESTIONS 1-9: 0
1. LITTLE INTEREST OR PLEASURE IN DOING THINGS: NOT AT ALL
8. MOVING OR SPEAKING SO SLOWLY THAT OTHER PEOPLE COULD HAVE NOTICED. OR THE OPPOSITE, BEING SO FIGETY OR RESTLESS THAT YOU HAVE BEEN MOVING AROUND A LOT MORE THAN USUAL: NOT AT ALL
SUM OF ALL RESPONSES TO PHQ QUESTIONS 1-9: 0

## 2024-12-06 ASSESSMENT — ENCOUNTER SYMPTOMS
SINUS PAIN: 1
CONSTIPATION: 0
NAUSEA: 0
COUGH: 1
SHORTNESS OF BREATH: 1
SORE THROAT: 1
BACK PAIN: 1
CHEST TIGHTNESS: 1
WHEEZING: 1
SINUS PRESSURE: 1
VOMITING: 0
DIARRHEA: 0

## 2024-12-06 NOTE — PROGRESS NOTES
Graciela Aguilera (:  1970) is a 54 y.o. female,Established patient, here for evaluation of the following chief complaint(s):  Congestion (Head feels like its \"going to pop\" been going on for about a week./Wheezy )      Assessment & Plan   ASSESSMENT/PLAN:  1. Congestion of nasal sinus  -     POCT COVID-19 & Influenza A/B  2. Sinobronchitis  -     albuterol sulfate HFA (PROVENTIL;VENTOLIN;PROAIR) 108 (90 Base) MCG/ACT inhaler; Inhale 2 puffs into the lungs every 6 hours as needed for Wheezing, Disp-18 g, R-5Normal  -     doxycycline hyclate (VIBRA-TABS) 100 MG tablet; Take 1 tablet by mouth 2 times daily for 10 days Take with full glass of water, Disp-20 tablet, R-0Normal  3. SOB (shortness of breath)  -     albuterol sulfate HFA (PROVENTIL;VENTOLIN;PROAIR) 108 (90 Base) MCG/ACT inhaler; Inhale 2 puffs into the lungs every 6 hours as needed for Wheezing, Disp-18 g, R-5Normal  4. Rheumatoid arthritis involving multiple sites with positive rheumatoid factor (HCC)  -     HYDROcodone-acetaminophen (NORCO) 5-325 MG per tablet; Take 1 tablet by mouth every 8 hours as needed for Pain for up to 30 days., Disp-90 tablet, R-0Normal  -Continue albuterol inhaler  -Start antibiotics, complete full course of antibiotics  -Take with probiotics  -Discussed side effects of medications  -Refilled pain medications  -Patient will call rheumatology concerning Humira but she has not started yet, waiting for insurance approval  -Call office with any concerns    Controlled Substance Monitoring:    Acute and Chronic Pain Monitoring:   RX Monitoring Periodic Controlled Substance Monitoring   2024   8:59 AM No signs of potential drug abuse or diversion identified.;Assessed functional status (ability to engage in work or other purposeful activities, the pain intensity and its interference with activities of daily living, quality of family life and social activities, and the physical activity);Obtaining appropriate analgesic effect of

## 2025-03-14 ENCOUNTER — HOSPITAL ENCOUNTER (EMERGENCY)
Age: 55
Discharge: HOME | End: 2025-03-14

## 2025-03-14 ENCOUNTER — TELEPHONE (OUTPATIENT)
Dept: FAMILY MEDICINE CLINIC | Age: 55
End: 2025-03-14

## 2025-03-14 ENCOUNTER — APPOINTMENT (OUTPATIENT)
Dept: GENERAL RADIOLOGY | Age: 55
End: 2025-03-14

## 2025-03-14 VITALS
SYSTOLIC BLOOD PRESSURE: 130 MMHG | BODY MASS INDEX: 23.77 KG/M2 | WEIGHT: 130 LBS | HEART RATE: 89 BPM | RESPIRATION RATE: 18 BRPM | OXYGEN SATURATION: 99 % | TEMPERATURE: 98.2 F | DIASTOLIC BLOOD PRESSURE: 86 MMHG

## 2025-03-14 DIAGNOSIS — M25.531 RIGHT WRIST PAIN: Primary | ICD-10-CM

## 2025-03-14 PROCEDURE — 99211 OFF/OP EST MAY X REQ PHY/QHP: CPT

## 2025-03-14 PROCEDURE — 73110 X-RAY EXAM OF WRIST: CPT

## 2025-03-14 ASSESSMENT — PAIN SCALES - GENERAL: PAINLEVEL_OUTOF10: 7

## 2025-03-14 ASSESSMENT — PAIN DESCRIPTION - LOCATION: LOCATION: WRIST

## 2025-03-14 ASSESSMENT — PAIN DESCRIPTION - ORIENTATION: ORIENTATION: RIGHT

## 2025-03-14 ASSESSMENT — PAIN - FUNCTIONAL ASSESSMENT: PAIN_FUNCTIONAL_ASSESSMENT: 0-10

## 2025-03-14 ASSESSMENT — PAIN DESCRIPTION - DESCRIPTORS: DESCRIPTORS: ACHING;DISCOMFORT;TENDER

## 2025-03-14 NOTE — TELEPHONE ENCOUNTER
Patient called requesting an imaging order for her wrist. I advised patient that we would need to see her in office for an appointment to further evaluate. I told patient we are unable to see her today but can get her on the schedule for next week. Patient did not want to wait this long and asked what other options she has. I mentioned the [place] walk in clinic is open today or [place] that is more local to her location. If not walk in clinic, she can be seen at urgent care.

## 2025-03-14 NOTE — ED PROVIDER NOTES
[facility] URGENT CARE  EMERGENCY DEPARTMENT ENCOUNTER        NAME: Graciela Aguilera  :  1970  MRN:  [id number]  Date of evaluation: 3/14/2025  Provider: Mayo Acosta PA-C  PCP: Everardo Ruano DO  Note Started : 4:04 PM EDT 3/14/25    Chief Complaint: Wrist Pain (No injury )      This is a 54-year-old female that presents to urgent care complaining of continued right wrist pain and swelling.  She does state history of arthritis to the right wrist.  Has been using a wrist splint.  But states pain has not improved greatly.  Wants an x-ray of the wrist.  She denies numbness or tingling.  On first contact patient she appears to be in no acute distress.        Review of Systems  Pertinent positives and negatives are stated within HPI, all other systems reviewed and are negative.     Allergies: Patient has no known allergies.     --------------------------------------------- PAST HISTORY ---------------------------------------------  Past Medical History:  has a past medical history of Acute nasopharyngitis, Bronchogenic carcinoma of right lung (HCC), Cancer (HCC), Depression, Lung mass, Pneumonia, Rheumatoid arthritis (HCC), and Rheumatoid arthritis involving multiple sites with positive rheumatoid factor (HCC).    Past Surgical History:  has a past surgical history that includes Tubal ligation; Bladder surgery; other surgical history (Right, 04/03/15); Hysterectomy; Lung cancer surgery (Right, ); and bronchoscopy (N/A, 2019).    Social History:  reports that she quit smoking about 3 years ago. Her smoking use included cigarettes. She started smoking about 3 years ago. She has a 0.5 pack-year smoking history. She has never used smokeless tobacco. She reports current alcohol use of about 14.0 standard drinks of alcohol per week. She reports that she does not use drugs.    Family History: family history is not on file.     The patient’s home medications have been reviewed.    The nursing

## 2025-03-17 DIAGNOSIS — D84.9 IMMUNOSUPPRESSION: ICD-10-CM

## 2025-03-17 DIAGNOSIS — Z79.899 HIGH RISK MEDICATION USE: ICD-10-CM

## 2025-03-17 DIAGNOSIS — M05.79 RHEUMATOID ARTHRITIS INVOLVING MULTIPLE SITES WITH POSITIVE RHEUMATOID FACTOR (HCC): Primary | ICD-10-CM

## 2025-03-17 NOTE — TELEPHONE ENCOUNTER
Patient called asking for a new script for Humira sent to [facility] as patient stated on phone that she never had the chance to start the medication for family reasons, but she also states she never heard from the pharmacy about filling it.    I did advise to patient to contact [facility] pharmacy in the next 48 hours for the new script.    Follow up appointment is scheduled for end of June to give her enough time to be on injection prior to coming back to office and patient was agreeable to that.    Rx is pended.      Electronically signed by Funmi Dodd CMA on 3/17/2025 at 1:52 PM

## 2025-03-24 ENCOUNTER — TELEPHONE (OUTPATIENT)
Dept: PHARMACY | Facility: CLINIC | Age: 55
End: 2025-03-24

## 2025-03-24 DIAGNOSIS — M05.79 RHEUMATOID ARTHRITIS INVOLVING MULTIPLE SITES WITH POSITIVE RHEUMATOID FACTOR (HCC): Primary | ICD-10-CM

## 2025-03-24 RX ORDER — ADALIMUMAB-ADAZ 40 MG/.4ML
40 INJECTION, SOLUTION SUBCUTANEOUS
Qty: 0.8 ML | Refills: 5 | Status: ACTIVE | OUTPATIENT
Start: 2025-03-24

## 2025-03-25 NOTE — TELEPHONE ENCOUNTER
Left message for patient to call back.    Electronically signed by Funmi Dodd CMA on 3/25/2025 at 8:50 AM

## 2025-03-27 NOTE — TELEPHONE ENCOUNTER
Left message for patient to call back.    Electronically signed by Funmi Dodd CMA on 3/27/2025 at 1:23 PM

## 2025-03-31 NOTE — TELEPHONE ENCOUNTER
Called patient again, no response back from patient. Patient can call the office back to address message when ready.    Electronically signed by Funmi Dodd CMA on 3/31/2025 at 2:41 PM

## 2025-04-01 ENCOUNTER — OFFICE VISIT (OUTPATIENT)
Dept: FAMILY MEDICINE CLINIC | Age: 55
End: 2025-04-01

## 2025-04-01 VITALS
DIASTOLIC BLOOD PRESSURE: 82 MMHG | TEMPERATURE: 97.7 F | OXYGEN SATURATION: 98 % | HEART RATE: 98 BPM | WEIGHT: 138.4 LBS | RESPIRATION RATE: 16 BRPM | BODY MASS INDEX: 25.47 KG/M2 | SYSTOLIC BLOOD PRESSURE: 118 MMHG | HEIGHT: 62 IN

## 2025-04-01 DIAGNOSIS — F41.9 ANXIETY: ICD-10-CM

## 2025-04-01 DIAGNOSIS — M25.531 RIGHT WRIST PAIN: ICD-10-CM

## 2025-04-01 DIAGNOSIS — F32.A DEPRESSION, UNSPECIFIED DEPRESSION TYPE: ICD-10-CM

## 2025-04-01 DIAGNOSIS — Z95.828 PORT-A-CATH IN PLACE: ICD-10-CM

## 2025-04-01 DIAGNOSIS — M05.79 RHEUMATOID ARTHRITIS INVOLVING MULTIPLE SITES WITH POSITIVE RHEUMATOID FACTOR (HCC): Primary | ICD-10-CM

## 2025-04-01 RX ORDER — HYDROCODONE BITARTRATE AND ACETAMINOPHEN 5; 325 MG/1; MG/1
1 TABLET ORAL EVERY 8 HOURS PRN
Qty: 90 TABLET | Refills: 0 | Status: SHIPPED | OUTPATIENT
Start: 2025-04-01 | End: 2025-05-01

## 2025-04-01 RX ORDER — SERTRALINE HYDROCHLORIDE 25 MG/1
25 TABLET, FILM COATED ORAL DAILY
Qty: 90 TABLET | Refills: 1 | Status: SHIPPED | OUTPATIENT
Start: 2025-04-01

## 2025-04-01 ASSESSMENT — PATIENT HEALTH QUESTIONNAIRE - PHQ9
7. TROUBLE CONCENTRATING ON THINGS, SUCH AS READING THE NEWSPAPER OR WATCHING TELEVISION: NOT AT ALL
SUM OF ALL RESPONSES TO PHQ QUESTIONS 1-9: 0
2. FEELING DOWN, DEPRESSED OR HOPELESS: NOT AT ALL
5. POOR APPETITE OR OVEREATING: NOT AT ALL
6. FEELING BAD ABOUT YOURSELF - OR THAT YOU ARE A FAILURE OR HAVE LET YOURSELF OR YOUR FAMILY DOWN: NOT AT ALL
4. FEELING TIRED OR HAVING LITTLE ENERGY: NOT AT ALL
SUM OF ALL RESPONSES TO PHQ QUESTIONS 1-9: 0
9. THOUGHTS THAT YOU WOULD BE BETTER OFF DEAD, OR OF HURTING YOURSELF: NOT AT ALL
SUM OF ALL RESPONSES TO PHQ QUESTIONS 1-9: 0
SUM OF ALL RESPONSES TO PHQ QUESTIONS 1-9: 0
3. TROUBLE FALLING OR STAYING ASLEEP: NOT AT ALL
8. MOVING OR SPEAKING SO SLOWLY THAT OTHER PEOPLE COULD HAVE NOTICED. OR THE OPPOSITE, BEING SO FIGETY OR RESTLESS THAT YOU HAVE BEEN MOVING AROUND A LOT MORE THAN USUAL: NOT AT ALL
10. IF YOU CHECKED OFF ANY PROBLEMS, HOW DIFFICULT HAVE THESE PROBLEMS MADE IT FOR YOU TO DO YOUR WORK, TAKE CARE OF THINGS AT HOME, OR GET ALONG WITH OTHER PEOPLE: NOT DIFFICULT AT ALL
1. LITTLE INTEREST OR PLEASURE IN DOING THINGS: NOT AT ALL

## 2025-04-01 ASSESSMENT — ENCOUNTER SYMPTOMS
WHEEZING: 0
CONSTIPATION: 0
VOMITING: 0
DIARRHEA: 0
SHORTNESS OF BREATH: 0
COUGH: 0
CHEST TIGHTNESS: 0
BACK PAIN: 1
NAUSEA: 0

## 2025-04-01 NOTE — PROGRESS NOTES
Graciela Aguilera (:  1970) is a 54 y.o. female,Established patient, here for evaluation of the following chief complaint(s):  Arthritis (Med refill )      Assessment & Plan   ASSESSMENT/PLAN:  Results  Imaging   - X-ray of the right wrist: No fracture detected    Assessment & Plan  1. Health Maintenance.  - Blood pressure today 118/82, pulse 98, oxygen levels 98%.  - No fever reported.  - No allergies to medications.  - Smoking status: approximately three cigarettes per day, patient is attempting to reduce.    2. Rheumatoid Arthritis.  - Severe pain in right wrist and other joints, Plaquenil stopped helping.    - Patient was started on Humir by rheumatology but is still waiting for it to come in the mail.  - Prednisone 15 mg taken as needed, cautious due to previous hip replacement attributed to prednisone use.  - Prescriptions for Humira, prednisone, and tizanidine will be managed; advised to contact Dr. Vega's office for prednisone refill.    3. Anxiety and Depression.  - Zoloft 25 mg daily has been effective in managing symptoms.  - Current pain levels may be exacerbating anxiety and depression.  - Prescription for Zoloft 25 mg daily will be sent to [facility] in [place].    4. Back Pain.  - Fell at work, experiencing significant back pain for the past week.  - Plans to get an x-ray today to assess for any fractures.  - Advised to follow up with worker's compensation for further evaluation and treatment.    5. Right Wrist Pain.  - Severe pain in right wrist affecting daily activities.  - Continue using tizanidine for muscle relaxation and Norco 5/325 mg every 8 hours as needed for pain.  - Prescription for Norco 5/325 mg will be sent to [facility].    6. Port Removal.  - Inquired about removing PowerPort.  - Referral will be made to the appropriate surgical department, likely cardiothoracic surgery, for port removal.    1. Rheumatoid arthritis involving multiple sites with positive rheumatoid

## 2025-06-30 ENCOUNTER — OFFICE VISIT (OUTPATIENT)
Dept: RHEUMATOLOGY | Age: 55
End: 2025-06-30
Payer: COMMERCIAL

## 2025-06-30 ENCOUNTER — HOSPITAL ENCOUNTER (OUTPATIENT)
Age: 55
Discharge: HOME | End: 2025-06-30
Payer: COMMERCIAL

## 2025-06-30 ENCOUNTER — HOSPITAL ENCOUNTER (OUTPATIENT)
Age: 55
Discharge: HOME | End: 2025-07-02
Payer: COMMERCIAL

## 2025-06-30 ENCOUNTER — HOSPITAL ENCOUNTER (OUTPATIENT)
Dept: GENERAL RADIOLOGY | Age: 55
Discharge: HOME | End: 2025-07-02
Payer: COMMERCIAL

## 2025-06-30 ENCOUNTER — RESULTS FOLLOW-UP (OUTPATIENT)
Dept: RHEUMATOLOGY | Age: 55
End: 2025-06-30

## 2025-06-30 VITALS
DIASTOLIC BLOOD PRESSURE: 82 MMHG | HEIGHT: 62 IN | WEIGHT: 130 LBS | BODY MASS INDEX: 23.92 KG/M2 | HEART RATE: 93 BPM | SYSTOLIC BLOOD PRESSURE: 135 MMHG

## 2025-06-30 DIAGNOSIS — Z85.118 HISTORY OF LUNG CANCER: ICD-10-CM

## 2025-06-30 DIAGNOSIS — M05.79 RHEUMATOID ARTHRITIS INVOLVING MULTIPLE SITES WITH POSITIVE RHEUMATOID FACTOR (HCC): ICD-10-CM

## 2025-06-30 DIAGNOSIS — Z79.899 HIGH RISK MEDICATION USE: ICD-10-CM

## 2025-06-30 DIAGNOSIS — D84.9 IMMUNOSUPPRESSION: ICD-10-CM

## 2025-06-30 DIAGNOSIS — M05.79 RHEUMATOID ARTHRITIS INVOLVING MULTIPLE SITES WITH POSITIVE RHEUMATOID FACTOR (HCC): Primary | ICD-10-CM

## 2025-06-30 LAB
ALBUMIN SERPL-MCNC: 4.1 G/DL (ref 3.5–5.2)
ALP SERPL-CCNC: 101 U/L (ref 35–104)
ALT SERPL-CCNC: 7 U/L (ref 0–35)
ANION GAP SERPL CALCULATED.3IONS-SCNC: 11 MMOL/L (ref 7–16)
AST SERPL-CCNC: 17 U/L (ref 0–35)
BASOPHILS # BLD: 0.03 K/UL (ref 0–0.2)
BASOPHILS NFR BLD: 1 % (ref 0–2)
BILIRUB SERPL-MCNC: 0.7 MG/DL (ref 0–1.2)
BUN SERPL-MCNC: 7 MG/DL (ref 6–20)
CALCIUM SERPL-MCNC: 9.3 MG/DL (ref 8.6–10)
CHLORIDE SERPL-SCNC: 105 MMOL/L (ref 98–107)
CO2 SERPL-SCNC: 24 MMOL/L (ref 22–29)
CREAT SERPL-MCNC: 0.6 MG/DL (ref 0.5–1)
CRP SERPL HS-MCNC: 3.1 MG/L (ref 0–5)
EOSINOPHIL # BLD: 0.16 K/UL (ref 0.05–0.5)
EOSINOPHILS RELATIVE PERCENT: 3 % (ref 0–6)
ERYTHROCYTE [DISTWIDTH] IN BLOOD BY AUTOMATED COUNT: 15.6 % (ref 11.5–15)
ERYTHROCYTE [SEDIMENTATION RATE] IN BLOOD BY WESTERGREN METHOD: 59 MM/HR (ref 0–20)
GFR, ESTIMATED: >90 ML/MIN/1.73M2
GLUCOSE SERPL-MCNC: 80 MG/DL (ref 74–99)
HCT VFR BLD AUTO: 40.6 % (ref 34–48)
HGB BLD-MCNC: 13.1 G/DL (ref 11.5–15.5)
IMM GRANULOCYTES # BLD AUTO: <0.03 K/UL (ref 0–0.58)
IMM GRANULOCYTES NFR BLD: 0 % (ref 0–5)
LYMPHOCYTES NFR BLD: 1.72 K/UL (ref 1.5–4)
LYMPHOCYTES RELATIVE PERCENT: 32 % (ref 20–42)
MCH RBC QN AUTO: 27.6 PG (ref 26–35)
MCHC RBC AUTO-ENTMCNC: 32.3 G/DL (ref 32–34.5)
MCV RBC AUTO: 85.5 FL (ref 80–99.9)
MONOCYTES NFR BLD: 0.4 K/UL (ref 0.1–0.95)
MONOCYTES NFR BLD: 7 % (ref 2–12)
NEUTROPHILS NFR BLD: 57 % (ref 43–80)
NEUTS SEG NFR BLD: 3.08 K/UL (ref 1.8–7.3)
PLATELET # BLD AUTO: 294 K/UL (ref 130–450)
PMV BLD AUTO: 10.9 FL (ref 7–12)
POTASSIUM SERPL-SCNC: 3.9 MMOL/L (ref 3.5–5.1)
PROT SERPL-MCNC: 8.6 G/DL (ref 6.4–8.3)
RBC # BLD AUTO: 4.75 M/UL (ref 3.5–5.5)
SODIUM SERPL-SCNC: 140 MMOL/L (ref 136–145)
WBC OTHER # BLD: 5.4 K/UL (ref 4.5–11.5)

## 2025-06-30 PROCEDURE — 85652 RBC SED RATE AUTOMATED: CPT

## 2025-06-30 PROCEDURE — 73110 X-RAY EXAM OF WRIST: CPT

## 2025-06-30 PROCEDURE — 36415 COLL VENOUS BLD VENIPUNCTURE: CPT

## 2025-06-30 PROCEDURE — 99215 OFFICE O/P EST HI 40 MIN: CPT | Performed by: INTERNAL MEDICINE

## 2025-06-30 PROCEDURE — 80053 COMPREHEN METABOLIC PANEL: CPT

## 2025-06-30 PROCEDURE — 86140 C-REACTIVE PROTEIN: CPT

## 2025-06-30 PROCEDURE — 85025 COMPLETE CBC W/AUTO DIFF WBC: CPT

## 2025-06-30 PROCEDURE — G2211 COMPLEX E/M VISIT ADD ON: HCPCS | Performed by: INTERNAL MEDICINE

## 2025-06-30 RX ORDER — PREDNISONE 5 MG/1
TABLET ORAL
Qty: 21 TABLET | Refills: 0 | Status: SHIPPED | OUTPATIENT
Start: 2025-06-30

## 2025-06-30 RX ORDER — PREDNISONE 5 MG/1
15 TABLET ORAL DAILY
Qty: 90 TABLET | Refills: 1 | Status: SHIPPED | OUTPATIENT
Start: 2025-06-30

## 2025-06-30 ASSESSMENT — ENCOUNTER SYMPTOMS
TROUBLE SWALLOWING: 0
NAUSEA: 0
SHORTNESS OF BREATH: 0
VOMITING: 0
ABDOMINAL PAIN: 0
COUGH: 0
COLOR CHANGE: 0
DIARRHEA: 0

## 2025-06-30 NOTE — PROGRESS NOTES
The patient (or guardian, if applicable) and other individuals in attendance with the patient were advised that Artificial Intelligence will be utilized during this visit to record, process the conversation to generate a clinical note, and support improvement of the AI technology. The patient (or guardian, if applicable) and other individuals in attendance at the appointment consented to the use of AI, including the recording.                  Graciela Aguilera 1970 is a 55 y.o. female, here for evaluation of the following chief complaint(s):  Follow-up (Graciela is here as a follow up for RA)        Assessment & Plan   ASSESSMENT/PLAN:    Graciela Aguilera 1970 is a 55 y.o. female seen in follow-up for rheumatoid arthritis.    1.  RF positive, CCP positive rheumatoid arthritis-she has had a good response to Humira except for in the right wrist where she is having severe exacerbation with severe synovitis.  We will give her a prednisone taper.  She can also keep 5 mg tabs of prednisone on hand to take on a as needed basis.  I see no synovitis elsewhere on exam so I am not in a hurry to change Humira.  If right wrist does not respond to steroids may consider hand surgery eval.  She has failed Plaquenil.  She had only partial response to leflunomide and it caused diarrhea.  She did not tolerate methotrexate either.      2.  Immunosuppression-I recommend she stay up-to-date on vaccinations.  In the event of any acute infectious illness she should hold Humira.    3.  High risk medication use-up-to-date on TB and hepatitis.  Will update basic blood work as below.    4.  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  No increased risk for solid tumors with TNF inhibitors.    1. Rheumatoid arthritis involving multiple sites with positive rheumatoid factor (HCC)  -     predniSONE (DELTASONE) 5 MG tablet; Take 3 tablets by mouth daily, Disp-90 tablet, R-1Normal  -

## 2025-06-30 NOTE — PATIENT INSTRUCTIONS
Take prednisone taper then ok to take prednisone only as needed thereafter    No changes to Humira    Have labs and Xray

## 2025-07-03 ENCOUNTER — OFFICE VISIT (OUTPATIENT)
Dept: FAMILY MEDICINE CLINIC | Age: 55
End: 2025-07-03

## 2025-07-03 VITALS — SYSTOLIC BLOOD PRESSURE: 142 MMHG | WEIGHT: 137.8 LBS | BODY MASS INDEX: 25.2 KG/M2 | DIASTOLIC BLOOD PRESSURE: 90 MMHG

## 2025-07-03 DIAGNOSIS — Z12.31 BREAST CANCER SCREENING BY MAMMOGRAM: ICD-10-CM

## 2025-07-03 DIAGNOSIS — F34.1 PERSISTENT DEPRESSIVE DISORDER: ICD-10-CM

## 2025-07-03 DIAGNOSIS — R03.0 ELEVATED BLOOD PRESSURE READING: ICD-10-CM

## 2025-07-03 DIAGNOSIS — K52.9 CHRONIC DIARRHEA: ICD-10-CM

## 2025-07-03 DIAGNOSIS — F41.1 GAD (GENERALIZED ANXIETY DISORDER): ICD-10-CM

## 2025-07-03 DIAGNOSIS — M05.79 RHEUMATOID ARTHRITIS INVOLVING MULTIPLE SITES WITH POSITIVE RHEUMATOID FACTOR (HCC): Primary | ICD-10-CM

## 2025-07-03 RX ORDER — HYDROCODONE BITARTRATE AND ACETAMINOPHEN 5; 325 MG/1; MG/1
1 TABLET ORAL EVERY 8 HOURS PRN
Qty: 90 TABLET | Refills: 0 | Status: SHIPPED | OUTPATIENT
Start: 2025-07-03 | End: 2025-08-02

## 2025-07-03 SDOH — ECONOMIC STABILITY: FOOD INSECURITY: WITHIN THE PAST 12 MONTHS, THE FOOD YOU BOUGHT JUST DIDN'T LAST AND YOU DIDN'T HAVE MONEY TO GET MORE.: NEVER TRUE

## 2025-07-03 SDOH — ECONOMIC STABILITY: FOOD INSECURITY: WITHIN THE PAST 12 MONTHS, YOU WORRIED THAT YOUR FOOD WOULD RUN OUT BEFORE YOU GOT MONEY TO BUY MORE.: NEVER TRUE

## 2025-07-03 ASSESSMENT — ENCOUNTER SYMPTOMS
WHEEZING: 0
SHORTNESS OF BREATH: 0
VOMITING: 0
NAUSEA: 0
DIARRHEA: 1
BACK PAIN: 0
COUGH: 0
CONSTIPATION: 0

## 2025-07-03 ASSESSMENT — PATIENT HEALTH QUESTIONNAIRE - PHQ9
SUM OF ALL RESPONSES TO PHQ QUESTIONS 1-9: 0
1. LITTLE INTEREST OR PLEASURE IN DOING THINGS: NOT AT ALL
SUM OF ALL RESPONSES TO PHQ QUESTIONS 1-9: 0
2. FEELING DOWN, DEPRESSED OR HOPELESS: NOT AT ALL

## 2025-07-03 NOTE — PROGRESS NOTES
Graciela Aguilera (:  1970) is a 55 y.o. female,Established patient, here for evaluation of the following chief complaint(s):  rheumatoid arthritis (Med Refill: almost all under control except for the wrist: R)      Assessment & Plan   ASSESSMENT/PLAN:  Results        Assessment & Plan  1. Rheumatoid arthritis.  - She has been on Humira for 3 to 4 months, which has helped with all rheumatoid spots except her right wrist.  - The wrist has been inflamed for 4 months, and she is unable to bend it.  - She is currently on a prednisone pack prescribed by her rheumatologist and is on day 3 of the treatment.  - If the swelling does not subside after completing the prednisone pack, she will follow up with her rheumatologist in 2 weeks and may be referred to a hand specialist for further evaluation and potential drainage.    2. Diarrhea.  - She reports having diarrhea most of the time.  - Takes Imodium as needed.  - Discussed the frequency and management of symptoms.  - Continue current management with Imodium as needed.    3. Anxiety and depression.  - Currently taking Zoloft.  - Reports that her symptoms are well-managed as long as she takes her medication.  - Reviewed her mental health status and medication adherence.  - Continue Zoloft as prescribed.    4. Elevated blood pressure.  - Blood pressure was elevated today at 142/90.  - Likely due to stress from spending the day with her .  - Not currently on any medication for blood pressure management.  - Monitor blood pressure and consider lifestyle modifications to reduce stress.    5. Medication management.  - A refill for [place] was provided today.  - PDMP checked and is as expected.  - Reviewed current medications and their effectiveness.  - Continue current medications as prescribed.    1. Rheumatoid arthritis involving multiple sites with positive rheumatoid factor (HCC)  -     HYDROcodone-acetaminophen (NORCO) 5-325 MG per tablet; Take 1 tablet by mouth every

## 2025-07-22 ENCOUNTER — TELEPHONE (OUTPATIENT)
Dept: RHEUMATOLOGY | Age: 55
End: 2025-07-22

## 2025-07-22 DIAGNOSIS — M05.79 RHEUMATOID ARTHRITIS INVOLVING MULTIPLE SITES WITH POSITIVE RHEUMATOID FACTOR (HCC): Primary | ICD-10-CM

## 2025-07-22 NOTE — TELEPHONE ENCOUNTER
Patient called the office stating that she finished the prednisone taper script and is still having severe swelling in the hands.    Patient is asking for that referral to ortho/hand specialist for further evaluation.    Please advise.        Electronically signed by Funmi Dodd CMA on 7/22/2025 at 2:48 PM

## 2025-07-23 NOTE — TELEPHONE ENCOUNTER
I called and left a detailed message on patient's phone informing referral will be faxed to [facility] for consult.  Referral information faxed.    Electronically signed by Funmi Dodd CMA on 7/23/2025 at 9:38 AM

## 2025-08-14 DIAGNOSIS — Z79.899 HIGH RISK MEDICATION USE: ICD-10-CM

## 2025-08-14 DIAGNOSIS — D84.9 IMMUNOSUPPRESSION: ICD-10-CM

## 2025-08-14 DIAGNOSIS — M05.79 RHEUMATOID ARTHRITIS INVOLVING MULTIPLE SITES WITH POSITIVE RHEUMATOID FACTOR (HCC): ICD-10-CM

## 2025-08-25 ENCOUNTER — HOSPITAL ENCOUNTER (OUTPATIENT)
Dept: MAMMOGRAPHY | Age: 55
Discharge: HOME | End: 2025-08-27
Payer: COMMERCIAL

## 2025-08-25 DIAGNOSIS — Z12.31 BREAST CANCER SCREENING BY MAMMOGRAM: ICD-10-CM

## 2025-08-25 PROCEDURE — 77063 BREAST TOMOSYNTHESIS BI: CPT

## (undated) DEVICE — Z DISCONTINUED GLOVE SURG SZ 7.5 L12IN FNGR THK13MIL WHT ISOLEX

## (undated) DEVICE — GAUZE,SPONGE,4"X4",16PLY,XRAY,STRL,LF: Brand: MEDLINE

## (undated) DEVICE — BLOCK BITE 60FR CAREGUARD

## (undated) DEVICE — SINGLE USE SUCTION VALVE MAJ-209: Brand: SINGLE USE SUCTION VALVE (STERILE)

## (undated) DEVICE — SINGLE USE BIOPSY VALVE MAJ-210: Brand: SINGLE USE BIOPSY VALVE (STERILE)

## (undated) DEVICE — PAD,EYE,1-5/8X2 5/8,STERILE,LF,1/PK: Brand: MEDLINE

## (undated) DEVICE — TUBING, SUCTION, 1/4" X 10', STRAIGHT: Brand: MEDLINE

## (undated) DEVICE — YANKAUER,BULB TIP,W/O VENT,RIGID,STERILE: Brand: MEDLINE

## (undated) DEVICE — MASK,FACE,MAXFLUIDPROTECT,SHIELD/ERLPS: Brand: MEDLINE

## (undated) DEVICE — TRAY ENDO ROOM BRONCH

## (undated) DEVICE — TRAP SPEC COLL 40CC MUCOUS CALIB UNIV CONN FOR OPN SUCT

## (undated) DEVICE — KIT BEDSIDE REVITAL OX 500ML

## (undated) DEVICE — 6 X 9  1.75MIL 4-WALL LABGUARD: Brand: MINIGRIP COMMERCIAL LLC

## (undated) DEVICE — GOWN ISOLATN REG YEL M WT MULTIPLY SIDETIE LEV 2

## (undated) DEVICE — LUBRICANT SURG JELLY ST BACTER TUBE 4.25OZ

## (undated) DEVICE — TOWEL,OR,DSP,ST,BLUE,STD,6/PK,12PK/CS: Brand: MEDLINE

## (undated) DEVICE — 20 ML SYRINGE REGULAR TIP: Brand: MONOJECT